# Patient Record
Sex: FEMALE | Race: BLACK OR AFRICAN AMERICAN | Employment: OTHER | URBAN - METROPOLITAN AREA
[De-identification: names, ages, dates, MRNs, and addresses within clinical notes are randomized per-mention and may not be internally consistent; named-entity substitution may affect disease eponyms.]

---

## 2016-11-15 LAB — CREATININE, EXTERNAL: 0.71

## 2017-01-16 ENCOUNTER — TELEPHONE (OUTPATIENT)
Dept: ENDOCRINOLOGY | Age: 67
End: 2017-01-16

## 2017-01-16 DIAGNOSIS — E11.65 UNCONTROLLED TYPE 2 DIABETES MELLITUS WITH HYPERGLYCEMIA, WITH LONG-TERM CURRENT USE OF INSULIN (HCC): ICD-10-CM

## 2017-01-16 DIAGNOSIS — Z79.4 UNCONTROLLED TYPE 2 DIABETES MELLITUS WITH HYPERGLYCEMIA, WITH LONG-TERM CURRENT USE OF INSULIN (HCC): ICD-10-CM

## 2017-01-17 RX ORDER — LANCETS
EACH MISCELLANEOUS
Qty: 200 EACH | Refills: 11 | Status: SHIPPED | OUTPATIENT
Start: 2017-01-17 | End: 2017-02-02 | Stop reason: SDUPTHER

## 2017-01-17 RX ORDER — INSULIN PUMP SYRINGE, 3 ML
EACH MISCELLANEOUS
Qty: 1 KIT | Refills: 0 | Status: SHIPPED | OUTPATIENT
Start: 2017-01-17 | End: 2017-01-30 | Stop reason: SDUPTHER

## 2017-01-19 ENCOUNTER — TELEPHONE (OUTPATIENT)
Dept: ENDOCRINOLOGY | Age: 67
End: 2017-01-19

## 2017-01-19 NOTE — TELEPHONE ENCOUNTER
Informed pt that she can take her metformin. Anything below 70 is considered a low blood sugar and that's when she needs to contact us. Pt verbalized understanding with no further questions or concerns at this time.

## 2017-01-19 NOTE — TELEPHONE ENCOUNTER
Patient went to ER last night blood sugar was 189. Patient says her blood sugar this morning is 164. Patient would like to know if she should take metformin this morning.  Please advise

## 2017-01-26 DIAGNOSIS — Z79.4 UNCONTROLLED TYPE 2 DIABETES MELLITUS WITH HYPERGLYCEMIA, WITH LONG-TERM CURRENT USE OF INSULIN (HCC): ICD-10-CM

## 2017-01-26 DIAGNOSIS — E11.65 UNCONTROLLED TYPE 2 DIABETES MELLITUS WITH HYPERGLYCEMIA, WITH LONG-TERM CURRENT USE OF INSULIN (HCC): ICD-10-CM

## 2017-01-27 ENCOUNTER — TELEPHONE (OUTPATIENT)
Dept: ENDOCRINOLOGY | Age: 67
End: 2017-01-27

## 2017-01-27 DIAGNOSIS — E11.65 UNCONTROLLED TYPE 2 DIABETES MELLITUS WITH HYPERGLYCEMIA, WITH LONG-TERM CURRENT USE OF INSULIN (HCC): ICD-10-CM

## 2017-01-27 DIAGNOSIS — Z79.4 UNCONTROLLED TYPE 2 DIABETES MELLITUS WITH HYPERGLYCEMIA, WITH LONG-TERM CURRENT USE OF INSULIN (HCC): ICD-10-CM

## 2017-01-27 NOTE — TELEPHONE ENCOUNTER
----- Message from Kyle Colbert sent at 1/27/2017  9:28 AM EST -----  Regarding: Dr. Jojo Chowdhury telephone  Contact: [phone]  Pt is requesting a call back regarding med metformin. Pt had questions about med dosage. Pt best contact number is [phone].

## 2017-01-30 RX ORDER — INSULIN PUMP SYRINGE, 3 ML
EACH MISCELLANEOUS
Qty: 1 KIT | Refills: 0 | Status: SHIPPED | OUTPATIENT
Start: 2017-01-30 | End: 2017-02-02 | Stop reason: SDUPTHER

## 2017-01-30 NOTE — TELEPHONE ENCOUNTER
Informed pt that freestyle meter was sent in as True Metrix is not covered. Pt states pharmacy did not send meter. Informed pt meter was sent on 01/17/2017. Pt asked that rx be sent to pharmacy again.

## 2017-01-30 NOTE — TELEPHONE ENCOUNTER
----- Message from [employer] sent at 1/27/2017 12:39 PM EST -----  Regarding: Ken/telephone  Pt stated the Rx she picked up for her test stripes will not work with the True Matrix meter. Pts phone number is  cell [phone].

## 2017-02-02 ENCOUNTER — TELEPHONE (OUTPATIENT)
Dept: ENDOCRINOLOGY | Age: 67
End: 2017-02-02

## 2017-02-02 DIAGNOSIS — E11.65 UNCONTROLLED TYPE 2 DIABETES MELLITUS WITH HYPERGLYCEMIA, WITH LONG-TERM CURRENT USE OF INSULIN (HCC): ICD-10-CM

## 2017-02-02 DIAGNOSIS — Z79.4 UNCONTROLLED TYPE 2 DIABETES MELLITUS WITH HYPERGLYCEMIA, WITH LONG-TERM CURRENT USE OF INSULIN (HCC): ICD-10-CM

## 2017-02-02 RX ORDER — INSULIN PUMP SYRINGE, 3 ML
EACH MISCELLANEOUS
Qty: 1 KIT | Refills: 0 | Status: SHIPPED | OUTPATIENT
Start: 2017-02-02 | End: 2017-03-22 | Stop reason: SDUPTHER

## 2017-02-02 RX ORDER — LANCETS 28 GAUGE
EACH MISCELLANEOUS
Qty: 200 LANCET | Refills: 11 | Status: SHIPPED | OUTPATIENT
Start: 2017-02-02 | End: 2017-03-22 | Stop reason: SDUPTHER

## 2017-02-02 NOTE — TELEPHONE ENCOUNTER
----- Message from Florence Davila sent at 2/2/2017  3:31 PM EST -----  Regarding: / telephone  Contact: [phone]  Pt is requesting a prescription for diabetic machine. Pt stated she spoke with pharmacy and said they didn't have a prescription and insurance company said they will cover new machine and give them a call.  Pt's best contact number is [phone]

## 2017-02-02 NOTE — TELEPHONE ENCOUNTER
Franck Treviño stated they spoke with the pt and told her she has to use a larger chain pharmacy due to her having medicare. Pt states the machine can be sent to [facility].

## 2017-02-02 NOTE — TELEPHONE ENCOUNTER
Informed pt that Dr Alexa Sheikh asked that she take Lantus 60 units in the AM and 50 units at bedtime. Informed her that she is to continue her meal time insulin and call the office if she is having any more low blood sugars. Pt verbalized understanding.

## 2017-02-02 NOTE — TELEPHONE ENCOUNTER
Pt called stating she is having low BG in the mornings. She is asking can she decrease one of her medications. She does not have any infections nor has she started any new medications. Pt confirmed she is taking medications as prescribed.  BG readings:  01/30- 104 AM   107 lunch   205 dinner   132 bedtime  01/31-  110 AM   119 lunch   203 dinner   132 bedtime  02/01- 68 AM   89 lunch   107 dinner   88 bedtime  02/02- 87 AM

## 2017-02-02 NOTE — TELEPHONE ENCOUNTER
Decrease Lantus 60 units in AM , 50 units at bedtime      No change to her meal time insulin for now    If she continues to have low sugars to call us

## 2017-03-22 ENCOUNTER — OFFICE VISIT (OUTPATIENT)
Dept: ENDOCRINOLOGY | Age: 67
End: 2017-03-22

## 2017-03-22 VITALS
BODY MASS INDEX: 36.89 KG/M2 | HEIGHT: 65 IN | SYSTOLIC BLOOD PRESSURE: 110 MMHG | HEART RATE: 73 BPM | WEIGHT: 221.4 LBS | DIASTOLIC BLOOD PRESSURE: 56 MMHG | RESPIRATION RATE: 18 BRPM | TEMPERATURE: 96.1 F

## 2017-03-22 DIAGNOSIS — Z79.4 TYPE 2 DIABETES MELLITUS WITH HYPERGLYCEMIA, WITH LONG-TERM CURRENT USE OF INSULIN (HCC): Primary | ICD-10-CM

## 2017-03-22 DIAGNOSIS — E11.65 TYPE 2 DIABETES MELLITUS WITH HYPERGLYCEMIA, UNSPECIFIED LONG TERM INSULIN USE STATUS: ICD-10-CM

## 2017-03-22 DIAGNOSIS — I10 ESSENTIAL HYPERTENSION WITH GOAL BLOOD PRESSURE LESS THAN 140/90: ICD-10-CM

## 2017-03-22 DIAGNOSIS — E11.65 UNCONTROLLED TYPE 2 DIABETES MELLITUS WITH HYPERGLYCEMIA, WITH LONG-TERM CURRENT USE OF INSULIN (HCC): ICD-10-CM

## 2017-03-22 DIAGNOSIS — Z79.4 TYPE 2 DIABETES MELLITUS WITH HYPERGLYCEMIA, WITH LONG-TERM CURRENT USE OF INSULIN (HCC): ICD-10-CM

## 2017-03-22 DIAGNOSIS — E11.65 TYPE 2 DIABETES MELLITUS WITH HYPERGLYCEMIA, WITH LONG-TERM CURRENT USE OF INSULIN (HCC): ICD-10-CM

## 2017-03-22 DIAGNOSIS — Z79.4 UNCONTROLLED TYPE 2 DIABETES MELLITUS WITH HYPERGLYCEMIA, WITH LONG-TERM CURRENT USE OF INSULIN (HCC): ICD-10-CM

## 2017-03-22 DIAGNOSIS — E78.2 MIXED HYPERLIPIDEMIA: ICD-10-CM

## 2017-03-22 DIAGNOSIS — E11.65 TYPE 2 DIABETES MELLITUS WITH HYPERGLYCEMIA, WITH LONG-TERM CURRENT USE OF INSULIN (HCC): Primary | ICD-10-CM

## 2017-03-22 RX ORDER — METFORMIN HYDROCHLORIDE 1000 MG/1
1000 TABLET ORAL 2 TIMES DAILY WITH MEALS
Qty: 60 TAB | Refills: 5 | Status: SHIPPED | OUTPATIENT
Start: 2017-03-22 | End: 2017-06-22 | Stop reason: SDUPTHER

## 2017-03-22 RX ORDER — INSULIN LISPRO 100 [IU]/ML
INJECTION, SOLUTION INTRAVENOUS; SUBCUTANEOUS
Qty: 45 ML | Refills: 5 | Status: SHIPPED | OUTPATIENT
Start: 2017-03-22 | End: 2017-06-22 | Stop reason: SDUPTHER

## 2017-03-22 RX ORDER — INSULIN GLARGINE 100 [IU]/ML
INJECTION, SOLUTION SUBCUTANEOUS
Qty: 120 ML | Refills: 11 | Status: SHIPPED | OUTPATIENT
Start: 2017-03-22 | End: 2017-06-22 | Stop reason: SDUPTHER

## 2017-03-22 NOTE — PROGRESS NOTES
[facility]               Linda Duong MD        [address] 78 [phone] Fax [phone]               Patient Information  Date:3/22/2017  Name : Fady Liu 73 y.o.     YOB: 1950         Referred by: Jessenia Cordero MD         Chief Complaint   Patient presents with    Diabetes    Cholesterol Problem    Hypertension       History of Present Illness: Fady Liu is a 73 y.o. female here for fu of  Type 2 Diabetes Mellitus. Referred by Jessenia Cordero MD for evaluation and management of uncontrolled diabetes mellitus. She was diagnosed with diabetes in 2008. She is checking blood glucose 3- 4 times daily. Reviewed log   Fasting hypoglycemia  , pre lunch are at goal ,   Diet healthy       + hypoglycemia. Wt Readings from Last 3 Encounters:   03/22/17 221 lb 6.4 oz (100.4 kg)   12/21/16 222 lb (100.7 kg)   09/21/16 222 lb (100.7 kg)       BP Readings from Last 3 Encounters:   03/22/17 110/56   12/21/16 126/56   09/21/16 107/43           Past Medical History:   Diagnosis Date    Arthritis     Breast cancer ([facility] [address]     Diabetes ([facility] [address]     Hyperlipidemia     Hypertension     Seizure ([facility] [address]      Current Outpatient Prescriptions   Medication Sig    glucose blood VI test strips (TRUE METRIX GLUCOSE TEST STRIP) strip by Does Not Apply route See Admin Instructions. Use to check blood glucose 4x daily. Dx code: E11.65    insulin lispro (HUMALOG KWIKPEN) 100 unit/mL kwikpen 45 units before each meal along with sliding scale Max daily units 150 Stop Apidra    metFORMIN (GLUCOPHAGE) 1,000 mg tablet Take 1 Tab by mouth two (2) times daily (with meals).  Insulin Needles, Disposable, (FAITH PEN NEEDLE) 32 gauge x 5/32\" ndle Four times a day Dx Code: E11.65    insulin syringe-needle U-100 1 mL 31 gauge x 15/64\" syrg 1 Syringe by Does Not Apply route two (2) times a day.  Dx code: E11.65    insulin glargine (LANTUS) 100 unit/mL injection Inject 60 units in AM and 60 units  at bed time (Patient taking differently: Inject 60 units in AM and 50 units  at bed time)    pioglitazone (ACTOS) 30 mg tablet TAKE ONE (1) TABLET, BY MOUTH, DAILY.  VITAMIN D2 50,000 unit capsule Take 50,000 Units by mouth. Every wed and sat    furosemide (LASIX) 40 mg tablet Take 40 mg by mouth daily.  atorvastatin (LIPITOR) 40 mg tablet Take  by mouth daily.  lisinopril (PRINIVIL, ZESTRIL) 5 mg tablet Take 5 mg by mouth daily.  loratadine (CLARITIN) 10 mg tablet Take 10 mg by mouth daily.  levETIRAcetam (KEPPRA) 500 mg tablet Take  by mouth two (2) times a day.  aspirin delayed-release 81 mg tablet Take  by mouth daily.  potassium chloride (KAON 10%) 20 mEq/15 mL solution Take  by mouth daily.  nabumetone (RELAFEN) 500 mg tablet Take 500 mg by mouth two (2) times a day. No current facility-administered medications for this visit. Allergies   Allergen Reactions    Vicodin [Hydrocodone-Acetaminophen] Unknown (comments)         Review of Systems:  -   - Gastrointestinal: no dysphagia no  abdominal pain  - Musculoskeletal: +  joint pains +   weakness  - Integumentary: no rashes  - Neurological: +  numbness, tingling, no  headaches  - Psychiatric: no depression no  anxiety  - Endocrine: no heat or cold intolerance    Physical Examination:   Blood pressure 110/56, pulse 73, temperature 96.1 °F (35.6 °C), temperature source Oral, resp. rate 18, height 5' 5\" (1.651 m), weight 221 lb 6.4 oz (100.4 kg). Estimated body mass index is 36.84 kg/(m^2) as calculated from the following:    Height as of this encounter: 5' 5\" (1.651 m). -   Weight as of this encounter: 221 lb 6.4 oz (100.4 kg).   - General: pleasant, no distress, good eye contact  - HEENT: no pallor, no periorbital edema, EOMI  - Neck: supple, no thyromegaly, no nodules  - Cardiovascular: regular, normal rate, normal S1 and S2  - Respiratory: clear to auscultation bilaterally  - Musculoskeletal: no edema,   - Neurological:alert and oriented  - Psychiatric: normal mood and affect  - Skin: color, texture, turgor normal.       Data Reviewed:     [] Glucose records reviewed. [] See glucose records for details (to be scanned). [] A1C  [] Reviewed labs      Assessment/Plan:     1. Type 2 diabetes mellitus with hyperglycemia, with long-term current use of insulin ([address]    2. Essential hypertension with goal blood pressure less than 140/90    3. Mixed hyperlipidemia        1. Type 2 Diabetes Mellitus       Lab Results   Component Value Date/Time    Hemoglobin A1c 7.6 03/15/2017 09:57 AM    Hemoglobin A1c (POC) 10.6 12/21/2016 11:28 AM    Hemoglobin A1c, External 9.4 08/11/2016     Discussed pathophysiology of type 2 diabetes, nutritional classes discussed. Actos 30 mg. Lantus BID, Apidra 40  units before each meal.   Stressed the importance of eating a healthy diet. DM classes    Metformin 1000 mg BID   ASA    Patient Instructions   Check blood sugars before meals/breakfast and at bedtime. Low blood glucose is less than 70     Maintain the log and bring it all your appointments    If the bedtime sugars are less than 100 ,eat a 15 gm snack. Continue Actos , Metformin     Lantus  60   units in AM and 40 units at bed time    Novolog or Apidra insulin 40 units before breakfast, 40 units before lunch and 40 units before dinner. No Apidra if sugars are less than 70     Additional Novolog or Humalog or Apidra  for high blood sugars     150-200 mg   3 units   201-250 mg   6 units   251-300 mg   9 units   301-350 mg   12units   351-400 mg   15 units           FLU annually ,Pneumovax ,aspirin daily,annual eye exam,microalbumin    2. HTN : Continue current therapy     3. Hyperlipidemia : Continue statin. 4.Obesity:Body mass index is 36.84 kg/(m^2). Discussed about the importance of exercise and carbohydrate portion control.           There are no Patient Instructions on file for this visit. Follow-up Disposition: Not on File    Thank you for allowing me to participate in the care of this patient.     Romulo Hernandes MD

## 2017-03-22 NOTE — PATIENT INSTRUCTIONS
Check blood sugars before meals/breakfast and at bedtime. Low blood glucose is less than 70     Maintain the log and bring it all your appointments    If the bedtime sugars are less than 100 ,eat a 15 gm snack. Continue Actos , Metformin     Lantus  60   units in AM and 40 units at bed time    Novolog or Apidra insulin 40 units before breakfast, 40 units before lunch and 40 units before dinner.    No Apidra if sugars are less than 70     Additional Novolog or Humalog or Apidra  for high blood sugars     150-200 mg   3 units   201-250 mg   6 units   251-300 mg   9 units   301-350 mg   12units   351-400 mg   15 units

## 2017-03-22 NOTE — MR AVS SNAPSHOT
Visit Information Date & Time Provider Department Dept. Phone Encounter #  
 3/22/2017 10:30 AM Kelby Chidlress MD [facility] [phone] [id number] Follow-up Instructions Return in about 3 months (around 6/22/2017). Upcoming Health Maintenance Date Due Hepatitis C Screening 1950 FOOT EXAM Q1 7/28/1961 EYE EXAM RETINAL OR DILATED Q1 7/28/1961 DTaP/Tdap/Td series (1 - Tdap) 7/28/1972 BREAST CANCER SCRN MAMMOGRAM 7/28/2001 FOBT Q 1 YEAR AGE 50-75 7/28/2001 ZOSTER VACCINE AGE 60> 7/28/2011 GLAUCOMA SCREENING Q2Y 7/28/2016 OSTEOPOROSIS SCREENING (DEXA) 7/28/2016 Pneumococcal 65+ High/Highest Risk (1 of 2 - PCV13) 7/28/2016 MEDICARE YEARLY EXAM 7/28/2016 INFLUENZA AGE 9 TO ADULT 8/1/2016 HEMOGLOBIN A1C Q6M 9/15/2017 MICROALBUMIN Q1 3/15/2018 LIPID PANEL Q1 3/15/2018 Allergies as of 3/22/2017  Review Complete On: 3/22/2017 By: Kelby Childress MD  
  
 Severity Noted Reaction Type Reactions Vicodin [Hydrocodone-acetaminophen]  03/13/2015    Unknown (comments) Current Immunizations  Never Reviewed No immunizations on file. Not reviewed this visit You Were Diagnosed With   
  
 Codes Comments Type 2 diabetes mellitus with hyperglycemia, with long-term current use of insulin (HCC)    -  Primary ICD-10-CM: E11.65, Z79.4 ICD-9-CM: 250.00, 790.29, V58.67 Essential hypertension with goal blood pressure less than 140/90     ICD-10-CM: I10 
ICD-9-CM: 401.9 Mixed hyperlipidemia     ICD-10-CM: E78.2 ICD-9-CM: 272.2 Vitals BP Pulse Temp Resp Height(growth percentile) Weight(growth percentile) 110/56 (BP 1 Location: Right arm, BP Patient Position: Sitting) 73 96.1 °F (35.6 °C) (Oral) 18 5' 5\" (1.651 m) 221 lb 6.4 oz (100.4 kg) BMI OB Status Smoking Status 36.84 kg/m2 Postmenopausal Former Smoker Vitals History BMI and BSA Data Body Mass Index Body Surface Area  
 36.84 kg/m 2 2.15 m 2 Preferred Pharmacy Pharmacy Name Phone [facility] [phone] Your Updated Medication List  
  
   
This list is accurate as of: 3/22/17 11:06 AM.  Always use your most recent med list.  
  
  
  
  
 aspirin delayed-release 81 mg tablet Take  by mouth daily. atorvastatin 40 mg tablet Commonly known as:  LIPITOR Take  by mouth daily. furosemide 40 mg tablet Commonly known as:  LASIX Take 40 mg by mouth daily. insulin glargine 100 unit/mL injection Commonly known as:  LANTUS Inject 60 units in AM and 60 units  at bed time  
  
 insulin lispro 100 unit/mL kwikpen Commonly known as:  HumaLOG KwikPen 45 units before each meal along with sliding scale Max daily units 150 Stop Apidra Insulin Needles (Disposable) 32 gauge x 5/32\" Ndle Commonly known as:  Janelle Pen Needle Four times a day Dx Code: E11.65  
  
 insulin syringe-needle U-100 1 mL 31 gauge x 15/64\" Syrg 1 Syringe by Does Not Apply route two (2) times a day. Dx code: E11.65  
  
 levETIRAcetam 500 mg tablet Commonly known as:  KEPPRA Take  by mouth two (2) times a day. lisinopril 5 mg tablet Commonly known as:  Arnold Asiya Take 5 mg by mouth daily. loratadine 10 mg tablet Commonly known as:  Juliet Kaufman Take 10 mg by mouth daily. metFORMIN 1,000 mg tablet Commonly known as:  GLUCOPHAGE Take 1 Tab by mouth two (2) times daily (with meals). nabumetone 500 mg tablet Commonly known as:  RELAFEN Take 500 mg by mouth two (2) times a day. pioglitazone 30 mg tablet Commonly known as:  ACTOS  
TAKE ONE (1) TABLET, BY MOUTH, DAILY. potassium chloride 20 mEq/15 mL solution Commonly known as:  KAON 10% Take  by mouth daily. TRUE METRIX GLUCOSE TEST STRIP strip Generic drug:  glucose blood VI test strips by Does Not Apply route See Admin Instructions. Use to check blood glucose 4x daily. Dx code: E11.65 VITAMIN D2 50,000 unit capsule Generic drug:  ergocalciferol Take 50,000 Units by mouth. Every wed and sat Follow-up Instructions Return in about 3 months (around 6/22/2017). Patient Instructions Check blood sugars before meals/breakfast and at bedtime. Low blood glucose is less than 70 Maintain the log and bring it all your appointments If the bedtime sugars are less than 100 ,eat a 15 gm snack. Continue Actos , Metformin Lantus  60   units in AM and 40 units at bed time Novolog or Apidra insulin 40 units before breakfast, 40 units before lunch and 40 units before dinner. No Apidra if sugars are less than 70 Additional Novolog or Humalog or Apidra  for high blood sugars 150-200 mg   3 units 201-250 mg   6 units 251-300 mg   9 units 301-350 mg   12units 351-400 mg   15 units Introducing [facility]! Jayla Page introduces [employer] patient portal. Now you can access parts of your medical record, email your doctor's office, and request medication refills online. 1. In your internet browser, go to [email or web address] 2. Click on the First Time User? Click Here link in the Sign In box. You will see the New Member Sign Up page. 3. Enter your [employer] Access Code exactly as it appears below. You will not need to use this code after youve completed the sign-up process. If you do not sign up before the expiration date, you must request a new code. · [employer] Access Code: [email or web address] Expires: 6/20/2017 11:06 AM 
 
4. Enter the last four digits of your Social Security Number (xxxx) and Date of Birth (mm/dd/yyyy) as indicated and click Submit. You will be taken to the next sign-up page. 5. Create a [employer] ID.  This will be your [employer] login ID and cannot be changed, so think of one that is secure and easy to remember. 6. Create a [employer] password. You can change your password at any time. 7. Enter your Password Reset Question and Answer. This can be used at a later time if you forget your password. 8. Enter your e-mail address. You will receive e-mail notification when new information is available in [address] 9. Click Sign Up. You can now view and download portions of your medical record. 10. Click the Download Summary menu link to download a portable copy of your medical information. If you have questions, please visit the Frequently Asked Questions section of the [employer] website. Remember, [employer] is NOT to be used for urgent needs. For medical emergencies, dial 911. Now available from your iPhone and Android! Please provide this summary of care documentation to your next provider. Your primary care clinician is listed as Maureen Helton. If you have any questions after today's visit, please call [phone].

## 2017-03-22 NOTE — PROGRESS NOTES
Amanuel Bennett is a 73 y.o. female here for   Chief Complaint   Patient presents with    Diabetes    Cholesterol Problem    Hypertension       Functional glucose monitor and record keeping system? - yes  Eye exam within last year? - yes 3/17/17  Foot exam within last year? - yes    Lab Results   Component Value Date/Time    Hemoglobin A1c 7.6 03/15/2017 09:57 AM    Hemoglobin A1c (POC) 10.6 12/21/2016 11:28 AM    Hemoglobin A1c, External 9.4 08/11/2016       Wt Readings from Last 3 Encounters:   12/21/16 222 lb (100.7 kg)   09/21/16 222 lb (100.7 kg)   05/19/16 203 lb 8 oz (92.3 kg)     Temp Readings from Last 3 Encounters:   12/21/16 96.2 °F (35.7 °C) (Oral)   09/21/16 98 °F (36.7 °C) (Oral)   05/19/16 97.5 °F (36.4 °C) (Oral)     BP Readings from Last 3 Encounters:   12/21/16 126/56   09/21/16 107/43   05/19/16 120/57     Pulse Readings from Last 3 Encounters:   12/21/16 67   09/21/16 68   05/19/16 67

## 2017-06-21 LAB — CREATININE, EXTERNAL: 0.68

## 2017-06-22 ENCOUNTER — OFFICE VISIT (OUTPATIENT)
Dept: ENDOCRINOLOGY | Age: 67
End: 2017-06-22

## 2017-06-22 VITALS
WEIGHT: 218 LBS | HEIGHT: 65 IN | OXYGEN SATURATION: 94 % | TEMPERATURE: 96.6 F | BODY MASS INDEX: 36.32 KG/M2 | HEART RATE: 85 BPM | RESPIRATION RATE: 18 BRPM | SYSTOLIC BLOOD PRESSURE: 106 MMHG | DIASTOLIC BLOOD PRESSURE: 56 MMHG

## 2017-06-22 DIAGNOSIS — Z79.4 TYPE 2 DIABETES MELLITUS WITH HYPERGLYCEMIA, WITH LONG-TERM CURRENT USE OF INSULIN (HCC): Primary | ICD-10-CM

## 2017-06-22 DIAGNOSIS — I10 ESSENTIAL HYPERTENSION WITH GOAL BLOOD PRESSURE LESS THAN 140/90: ICD-10-CM

## 2017-06-22 DIAGNOSIS — E11.65 UNCONTROLLED TYPE 2 DIABETES MELLITUS WITH HYPERGLYCEMIA, WITH LONG-TERM CURRENT USE OF INSULIN (HCC): ICD-10-CM

## 2017-06-22 DIAGNOSIS — Z79.4 UNCONTROLLED TYPE 2 DIABETES MELLITUS WITH HYPERGLYCEMIA, WITH LONG-TERM CURRENT USE OF INSULIN (HCC): ICD-10-CM

## 2017-06-22 DIAGNOSIS — E11.65 TYPE 2 DIABETES MELLITUS WITH HYPERGLYCEMIA, WITH LONG-TERM CURRENT USE OF INSULIN (HCC): ICD-10-CM

## 2017-06-22 DIAGNOSIS — E78.2 MIXED HYPERLIPIDEMIA: ICD-10-CM

## 2017-06-22 DIAGNOSIS — E11.65 TYPE 2 DIABETES MELLITUS WITH HYPERGLYCEMIA, WITH LONG-TERM CURRENT USE OF INSULIN (HCC): Primary | ICD-10-CM

## 2017-06-22 DIAGNOSIS — Z79.4 TYPE 2 DIABETES MELLITUS WITH HYPERGLYCEMIA, WITH LONG-TERM CURRENT USE OF INSULIN (HCC): ICD-10-CM

## 2017-06-22 DIAGNOSIS — E11.65 TYPE 2 DIABETES MELLITUS WITH HYPERGLYCEMIA, UNSPECIFIED LONG TERM INSULIN USE STATUS: ICD-10-CM

## 2017-06-22 LAB — GLUCOSE POC: 107 MG/DL

## 2017-06-22 RX ORDER — INSULIN GLARGINE 100 [IU]/ML
INJECTION, SOLUTION SUBCUTANEOUS
Qty: 120 ML | Refills: 11
Start: 2017-06-22 | End: 2017-11-24 | Stop reason: ALTCHOICE

## 2017-06-22 RX ORDER — TROSPIUM CHLORIDE 20 MG/1
40 TABLET, FILM COATED ORAL DAILY
COMMUNITY
Start: 2017-06-20 | End: 2018-11-05

## 2017-06-22 RX ORDER — INSULIN LISPRO 100 [IU]/ML
INJECTION, SOLUTION INTRAVENOUS; SUBCUTANEOUS
Qty: 45 ML | Refills: 5 | Status: CANCELLED | OUTPATIENT
Start: 2017-06-22

## 2017-06-22 RX ORDER — SYRINGE AND NEEDLE,INSULIN,1ML 31 GX5/16"
SYRINGE, EMPTY DISPOSABLE MISCELLANEOUS
COMMUNITY
Start: 2017-05-01 | End: 2018-11-05

## 2017-06-22 RX ORDER — CYCLOSPORINE 0.5 MG/ML
EMULSION OPHTHALMIC
COMMUNITY
Start: 2017-05-20 | End: 2022-02-04 | Stop reason: ALTCHOICE

## 2017-06-22 RX ORDER — METFORMIN HYDROCHLORIDE 1000 MG/1
1000 TABLET ORAL 2 TIMES DAILY WITH MEALS
Qty: 60 TAB | Refills: 5 | Status: CANCELLED | OUTPATIENT
Start: 2017-06-22

## 2017-06-22 RX ORDER — AZELASTINE HYDROCHLORIDE 0.5 MG/ML
SOLUTION/ DROPS OPHTHALMIC
COMMUNITY
Start: 2017-06-15 | End: 2022-02-04 | Stop reason: ALTCHOICE

## 2017-06-22 RX ORDER — INSULIN LISPRO 100 [IU]/ML
INJECTION, SOLUTION INTRAVENOUS; SUBCUTANEOUS
Qty: 45 ML | Refills: 5 | Status: SHIPPED | OUTPATIENT
Start: 2017-06-22 | End: 2018-02-20 | Stop reason: SDUPTHER

## 2017-06-22 RX ORDER — FAMOTIDINE 20 MG/1
TABLET, FILM COATED ORAL
COMMUNITY
Start: 2017-06-21 | End: 2018-11-05

## 2017-06-22 RX ORDER — METFORMIN HYDROCHLORIDE 1000 MG/1
1000 TABLET ORAL 2 TIMES DAILY WITH MEALS
Qty: 180 TAB | Refills: 3 | Status: SHIPPED | OUTPATIENT
Start: 2017-06-22 | End: 2018-06-19 | Stop reason: SDUPTHER

## 2017-06-22 RX ORDER — PIOGLITAZONEHYDROCHLORIDE 30 MG/1
TABLET ORAL
Qty: 90 TAB | Refills: 3 | Status: CANCELLED | OUTPATIENT
Start: 2017-06-22

## 2017-06-22 RX ORDER — PIOGLITAZONEHYDROCHLORIDE 30 MG/1
TABLET ORAL
Qty: 90 TAB | Refills: 3 | Status: SHIPPED | OUTPATIENT
Start: 2017-06-22 | End: 2018-01-30 | Stop reason: SDUPTHER

## 2017-06-22 NOTE — MR AVS SNAPSHOT
Visit Information Date & Time Provider Department Dept. Phone Encounter #  
 6/22/2017 11:00 AM Brad Villeda MD [facility] Diabetes & Endocrinology [phone] [id number] Follow-up Instructions Return in about 3 months (around 9/22/2017). Upcoming Health Maintenance Date Due Hepatitis C Screening 1950 FOOT EXAM Q1 7/28/1961 EYE EXAM RETINAL OR DILATED Q1 7/28/1961 DTaP/Tdap/Td series (1 - Tdap) 7/28/1972 BREAST CANCER SCRN MAMMOGRAM 7/28/2001 FOBT Q 1 YEAR AGE 50-75 7/28/2001 ZOSTER VACCINE AGE 60> 7/28/2011 GLAUCOMA SCREENING Q2Y 7/28/2016 OSTEOPOROSIS SCREENING (DEXA) 7/28/2016 Pneumococcal 65+ High/Highest Risk (1 of 2 - PCV13) 7/28/2016 MEDICARE YEARLY EXAM 7/28/2016 INFLUENZA AGE 9 TO ADULT 8/1/2017 HEMOGLOBIN A1C Q6M 9/15/2017 MICROALBUMIN Q1 3/15/2018 LIPID PANEL Q1 3/15/2018 Allergies as of 6/22/2017  Review Complete On: 6/22/2017 By: Kashif Rock LPN Severity Noted Reaction Type Reactions Oxycodone-acetaminophen  06/12/2017    Unknown (comments) Vicodin [Hydrocodone-acetaminophen]  03/13/2015    Unknown (comments) Current Immunizations  Never Reviewed No immunizations on file. Not reviewed this visit You Were Diagnosed With   
  
 Codes Comments Type 2 diabetes mellitus with hyperglycemia, with long-term current use of insulin (HCC)    -  Primary ICD-10-CM: E11.65, Z79.4 ICD-9-CM: 250.00, 790.29, V58.67 Type 2 diabetes mellitus with hyperglycemia, unspecified long term insulin use status     ICD-10-CM: E11.65 ICD-9-CM: 250.00 Uncontrolled type 2 diabetes mellitus with hyperglycemia, with long-term current use of insulin (HCC)     ICD-10-CM: E11.65, Z79.4 ICD-9-CM: 250.02, V58.67 Vitals BP Pulse Temp Resp Height(growth percentile) Weight(growth percentile) 106/56 85 96.6 °F (35.9 °C) (Oral) 18 5' 5\" (1.651 m) 218 lb (98.9 kg) SpO2 BMI OB Status Smoking Status 94% 36.28 kg/m2 Postmenopausal Former Smoker Vitals History BMI and BSA Data Body Mass Index Body Surface Area  
 36.28 kg/m 2 2.13 m 2 Preferred Pharmacy Pharmacy Name Phone [facility] [phone] Your Updated Medication List  
  
   
This list is accurate as of: 6/22/17 11:41 AM.  Always use your most recent med list.  
  
  
  
  
 aspirin delayed-release 81 mg tablet Take  by mouth daily. atorvastatin 40 mg tablet Commonly known as:  LIPITOR Take  by mouth daily. azelastine 0.05 % ophthalmic solution Commonly known as:  OPTIVAR  
  
 famotidine 20 mg tablet Commonly known as:  PEPCID  
  
 furosemide 40 mg tablet Commonly known as:  LASIX Take 40 mg by mouth daily. insulin glargine 100 unit/mL injection Commonly known as:  LANTUS Inject 60 units in AM and 40 units  at bed time  
  
 insulin lispro 100 unit/mL kwikpen Commonly known as:  HumaLOG KwikPen 40 units before each meal along with sliding scale Max daily units 150 Stop Apidra Insulin Needles (Disposable) 32 gauge x 5/32\" Ndle Commonly known as:  Janelle Pen Needle Four times a day Dx Code: E11.65  
  
 * insulin syringe-needle U-100 1 mL 31 gauge x 15/64\" Syrg 1 Syringe by Does Not Apply route two (2) times a day. Dx code: E11.65  
  
 * ULTICARE 1 mL 31 gauge x 5/16 Syrg Generic drug:  Insulin Syringe-Needle U-100  
  
 levETIRAcetam 500 mg tablet Commonly known as:  KEPPRA Take  by mouth two (2) times a day. lisinopril 5 mg tablet Commonly known as:  Roxianne Daughters Take 5 mg by mouth daily. loratadine 10 mg tablet Commonly known as:  Lendon Allen Take 10 mg by mouth daily. metFORMIN 1,000 mg tablet Commonly known as:  GLUCOPHAGE Take 1 Tab by mouth two (2) times daily (with meals). nabumetone 500 mg tablet Commonly known as:  RELAFEN  
 Take 500 mg by mouth two (2) times a day. pioglitazone 30 mg tablet Commonly known as:  ACTOS  
TAKE ONE (1) TABLET, BY MOUTH, DAILY. potassium chloride 20 mEq/15 mL solution Commonly known as:  KAON 10% Take  by mouth daily. RESTASIS 0.05 % ophthalmic emulsion Generic drug:  cycloSPORINE  
  
 trospium 20 mg tablet Commonly known as:  SANCTURA  
  
 TRUE METRIX GLUCOSE TEST STRIP strip Generic drug:  glucose blood VI test strips  
by Does Not Apply route See Admin Instructions. Use to check blood glucose 4x daily. Dx code: E11.65 VITAMIN D2 50,000 unit capsule Generic drug:  ergocalciferol Take 50,000 Units by mouth. Every wed and sat * Notice: This list has 2 medication(s) that are the same as other medications prescribed for you. Read the directions carefully, and ask your doctor or other care provider to review them with you. We Performed the Following AMB POC GLUCOSE BLOOD, BY GLUCOSE MONITORING DEVICE [39446 CPT(R)] AMB POC HEMOGLOBIN A1C [95583 CPT(R)] Follow-up Instructions Return in about 3 months (around 9/22/2017). Patient Instructions Check blood sugars before meals/breakfast and at bedtime. Low blood glucose is less than 70 Maintain the log and bring it all your appointments If the bedtime sugars are less than 100 ,eat a 15 gm snack. Continue Actos , Metformin Lantus  60   units in AM and 40 units at bed time Novolog or Apidra insulin 35  units before breakfast, 35 units before lunch and 35units before dinner. No Apidra if sugars are less than 70 Additional Novolog or Humalog or Apidra  for high blood sugars 150-200 mg   3 units 201-250 mg   6 units 251-300 mg   9 units 301-350 mg   12units 351-400 mg   15 units Introducing [facility]!    
 Alvaro Hendricks introduces SMART patient portal. Now you can access parts of your medical record, email your doctor's office, and request medication refills online. 1. In your internet browser, go to [email or web address] 2. Click on the First Time User? Click Here link in the Sign In box. You will see the New Member Sign Up page. 3. Enter your [employer] Access Code exactly as it appears below. You will not need to use this code after youve completed the sign-up process. If you do not sign up before the expiration date, you must request a new code. · [employer] Access Code: [email or web address] Expires: 9/20/2017 11:41 AM 
 
4. Enter the last four digits of your Social Security Number (xxxx) and Date of Birth (mm/dd/yyyy) as indicated and click Submit. You will be taken to the next sign-up page. 5. Create a [employer] ID. This will be your [employer] login ID and cannot be changed, so think of one that is secure and easy to remember. 6. Create a [employer] password. You can change your password at any time. 7. Enter your Password Reset Question and Answer. This can be used at a later time if you forget your password. 8. Enter your e-mail address. You will receive e-mail notification when new information is available in [address] 9. Click Sign Up. You can now view and download portions of your medical record. 10. Click the Download Summary menu link to download a portable copy of your medical information. If you have questions, please visit the Frequently Asked Questions section of the [employer] website. Remember, [employer] is NOT to be used for urgent needs. For medical emergencies, dial 911. Now available from your iPhone and Android! Please provide this summary of care documentation to your next provider. Your primary care clinician is listed as Nicole Nice. If you have any questions after today's visit, please call [phone].

## 2017-06-22 NOTE — PROGRESS NOTES
[facility] ENDOCRINOLOGY               Breanne Culver MD        [address] 78 [phone] Fax [phone]               Patient Information  Date:6/22/2017  Name : Mirtha Hsieh 73 y.o.     YOB: 1950         Referred by: Kami Srinivasan MD         Chief Complaint   Patient presents with    Diabetes       History of Present Illness: Mirtha Hsieh is a 73 y.o. female here for fu of  Type 2 Diabetes Mellitus. Referred by Kami Srinivasan MD for evaluation and management of uncontrolled diabetes mellitus. She was diagnosed with diabetes in 2008. She is checking blood glucose 3- 4 times daily. Reviewed log   She is doing very well   Few hypos   Diet healthy             Wt Readings from Last 3 Encounters:   06/22/17 218 lb (98.9 kg)   03/22/17 221 lb 6.4 oz (100.4 kg)   12/21/16 222 lb (100.7 kg)       BP Readings from Last 3 Encounters:   06/22/17 106/56   03/22/17 110/56   12/21/16 126/56           Past Medical History:   Diagnosis Date    Arthritis     [facility]     Diabetes [facility]     Hyperlipidemia     Hypertension     Seizure ([facility]      Current Outpatient Prescriptions   Medication Sig    azelastine (OPTIVAR) 0.05 % ophthalmic solution     RESTASIS 0.05 % ophthalmic emulsion     famotidine (PEPCID) 20 mg tablet     ULTICARE 1 mL 31 gauge x 5/16 syrg     trospium (SANCTURA) 20 mg tablet     glucose blood VI test strips (TRUE METRIX GLUCOSE TEST STRIP) strip by Does Not Apply route See Admin Instructions. Use to check blood glucose 4x daily. Dx code: E11.65    metFORMIN (GLUCOPHAGE) 1,000 mg tablet Take 1 Tab by mouth two (2) times daily (with meals).     insulin lispro (HUMALOG KWIKPEN) 100 unit/mL kwikpen 40 units before each meal along with sliding scale Max daily units 150 Stop Apidra    insulin glargine (LANTUS) 100 unit/mL injection Inject 60 units in AM and 40 units  at bed time    Insulin Needles, Disposable, (FAITH PEN NEEDLE) 32 gauge x 5/32\" ndle Four times a day Dx Code: E11.65    insulin syringe-needle U-100 1 mL 31 gauge x 15/64\" syrg 1 Syringe by Does Not Apply route two (2) times a day. Dx code: E11.65    pioglitazone (ACTOS) 30 mg tablet TAKE ONE (1) TABLET, BY MOUTH, DAILY.  VITAMIN D2 50,000 unit capsule Take 50,000 Units by mouth. Every wed and sat    furosemide (LASIX) 40 mg tablet Take 40 mg by mouth daily.  atorvastatin (LIPITOR) 40 mg tablet Take  by mouth daily.  lisinopril (PRINIVIL, ZESTRIL) 5 mg tablet Take 5 mg by mouth daily.  loratadine (CLARITIN) 10 mg tablet Take 10 mg by mouth daily.  levETIRAcetam (KEPPRA) 500 mg tablet Take  by mouth two (2) times a day.  aspirin delayed-release 81 mg tablet Take  by mouth daily.  potassium chloride (KAON 10%) 20 mEq/15 mL solution Take  by mouth daily.  nabumetone (RELAFEN) 500 mg tablet Take 500 mg by mouth two (2) times a day. No current facility-administered medications for this visit. Allergies   Allergen Reactions    Oxycodone-Acetaminophen Unknown (comments)    Vicodin [Hydrocodone-Acetaminophen] Unknown (comments)         Review of Systems:  -   - Gastrointestinal: no dysphagia no  abdominal pain  - Musculoskeletal: +  joint pains +   weakness  - Integumentary: no rashes  - Neurological: +  numbness, tingling, no  headaches  - Psychiatric: no depression no  anxiety  - Endocrine: no heat or cold intolerance    Physical Examination:   Blood pressure 106/56, pulse 85, temperature 96.6 °F (35.9 °C), temperature source Oral, resp. rate 18, height 5' 5\" (1.651 m), weight 218 lb (98.9 kg), SpO2 94 %. Estimated body mass index is 36.28 kg/(m^2) as calculated from the following:    Height as of this encounter: 5' 5\" (1.651 m). -   Weight as of this encounter: 218 lb (98.9 kg).   - General: pleasant, no distress, good eye contact  - HEENT: no pallor, no periorbital edema, EOMI  - Neck: supple, no thyromegaly, no nodules  - Cardiovascular: regular, normal rate, normal S1 and S2  - Respiratory: clear to auscultation bilaterally  - Musculoskeletal: no edema,   - Neurological:alert and oriented  - Psychiatric: normal mood and affect  - Skin: color, texture, turgor normal.       Data Reviewed:     [] Glucose records reviewed. [] See glucose records for details (to be scanned). [] A1C  [] Reviewed labs      Assessment/Plan:     1. Type 2 diabetes mellitus with hyperglycemia, with long-term current use of insulin ([address]    2. Type 2 diabetes mellitus with hyperglycemia, unspecified long term insulin use status    3. Uncontrolled type 2 diabetes mellitus with hyperglycemia, with long-term current use of insulin ([address]        1. Type 2 Diabetes Mellitus       Lab Results   Component Value Date/Time    Hemoglobin A1c 7.6 03/15/2017 09:57 AM    Hemoglobin A1c (POC) 10.6 12/21/2016 11:28 AM    Hemoglobin A1c, External 9.4 08/11/2016    A1C 6.8  Discussed pathophysiology of type 2 diabetes, nutritional classes discussed. Lantus BID, Apidra 35 units before each meal.   Decreased the prandial insulin  Stressed the importance of eating a healthy diet. DM classes    Metformin 1000 mg BID   ASA    There are no Patient Instructions on file for this visit. FLU annually ,Pneumovax ,aspirin daily,annual eye exam,microalbumin    2. HTN : Continue current therapy     3. Hyperlipidemia : Continue statin. 4.Obesity:Body mass index is 36.28 kg/(m^2). Discussed about the importance of exercise and carbohydrate portion control. There are no Patient Instructions on file for this visit. Follow-up Disposition: Not on File    Thank you for allowing me to participate in the care of this patient.     Nakul Herrera MD

## 2017-06-22 NOTE — PATIENT INSTRUCTIONS
Check blood sugars before meals/breakfast and at bedtime. Low blood glucose is less than 70     Maintain the log and bring it all your appointments    If the bedtime sugars are less than 100 ,eat a 15 gm snack. Continue Actos , Metformin     Lantus  60   units in AM and 40 units at bed time    Novolog or Apidra insulin 35  units before breakfast, 35 units before lunch and 35units before dinner.    No Apidra if sugars are less than 70     Additional Novolog or Humalog or Apidra  for high blood sugars     150-200 mg   3 units   201-250 mg   6 units   251-300 mg   9 units   301-350 mg   12units   351-400 mg   15 units

## 2017-06-22 NOTE — PROGRESS NOTES
Visit Vitals    /56    Pulse 85    Temp 96.6 °F (35.9 °C) (Oral)    Resp 18    Ht 5' 5\" (1.651 m)    Wt 218 lb (98.9 kg)    SpO2 94%    BMI 36.28 kg/m2     Chief Complaint   Patient presents with    Diabetes

## 2017-06-27 ENCOUNTER — TELEPHONE (OUTPATIENT)
Dept: ENDOCRINOLOGY | Age: 67
End: 2017-06-27

## 2017-06-27 NOTE — TELEPHONE ENCOUNTER
Last visit I had decreased the dose of meal time insulin  to 35 units before each meal , if she still has low sugars which is less than 70 decrease to 30 unit before each meal

## 2017-06-27 NOTE — TELEPHONE ENCOUNTER
----- Message from Eugenio London sent at 6/27/2017 12:19 PM EDT -----  Regarding: Dr. Adrián Ellsworth: [phone]  Pt is requesting a callback because pt was told to notify doctor when sugar levels fluctuated. The best contact number is [phone].

## 2017-06-27 NOTE — TELEPHONE ENCOUNTER
Spoke with patient. She did not have meter available. Was able to give me two blood sugars from today BB 87 . Patient stated blood sugar dropped to 68 Sunday night around 10pm. Discussed with patient importance of drinking juice when below 70 and rechecking within 15 minutes. Patient verbalized understanding. Patient will call back when meter readings are available.

## 2017-06-27 NOTE — TELEPHONE ENCOUNTER
Spoke with patient and gave her 's recommendation of decreasing meal time insulin to 30 units if blood sugars continue to go below 70. Patient verbalized understanding.

## 2017-06-30 ENCOUNTER — TELEPHONE (OUTPATIENT)
Dept: ENDOCRINOLOGY | Age: 67
End: 2017-06-30

## 2017-06-30 NOTE — TELEPHONE ENCOUNTER
Clarified once again patient instructions per . Patient verbalized understanding. Explained to patient daughter has everything written down as well.

## 2017-06-30 NOTE — TELEPHONE ENCOUNTER
Spoke with patient's daughter and she stated they are not sure if patient decreased humalog at this point but will make sure it is decreased to 30 units now. Instructed her that lantus is to be decreased to 50 units in the morning and 40 units at night and to call back if blood sugars continue to be low to make an appointment. Verbalized understanding.

## 2017-06-30 NOTE — TELEPHONE ENCOUNTER
Patient called and this am 77 was her blood sugar reading, then she took again now it is 86.  Please call thank you

## 2017-06-30 NOTE — TELEPHONE ENCOUNTER
----- Message from [employer] [employer] sent at 6/30/2017  1:43 PM EDT -----  Regarding: /Telephone   Pt returning call from practice did not understand the message about her sugar.  [facility]  [phone]

## 2017-06-30 NOTE — TELEPHONE ENCOUNTER
Spoke with patient's daughter Katty Ramon verified Nhan. She stated patient's blood sugars continue to run low. Did not have exact readings. Explained patient instructions to her and she said  She would get exact readings and call back. Blood sugar this morning was 77 and it came up to 86. She stated patient was feeling very shaky last night before bed and patient ate a snack. Could not verify if patient had decreased her meal time insulin to 30 units as patient instructions directed when blood sugars continue to run low. Verbalized understanding of patient instructions now.

## 2017-06-30 NOTE — TELEPHONE ENCOUNTER
PLease talk to pt and confirm if she has decreased Humalog to 30 units before each meal     Also decrease Lantus to 50 units in AM , 40 units PM     If she continues to have low sugars ask her to make an appointment with me

## 2017-07-06 ENCOUNTER — TELEPHONE (OUTPATIENT)
Dept: ENDOCRINOLOGY | Age: 67
End: 2017-07-06

## 2017-07-06 RX ORDER — PIOGLITAZONEHYDROCHLORIDE 30 MG/1
TABLET ORAL
Qty: 90 TAB | Refills: 4 | Status: SHIPPED | OUTPATIENT
Start: 2017-07-06 | End: 2018-01-30 | Stop reason: SDUPTHER

## 2017-07-21 NOTE — TELEPHONE ENCOUNTER
Spoke with patient and she stated she is having some lower blood sugars this week. Blood sugars are as follows:  07/20  BL 80 BD 92 bedtime 125  07/21 0400 72 0700 71  Patient state she did not take insulin when blood sugar was 71. Patient says she is taking lantus 50 units in the morning and 40 units at night. humalog 30 units with each meal plus sliding scale. Patient is taking metformin 2 times daily with meals and taking actos once daily. Please advise.

## 2017-07-21 NOTE — TELEPHONE ENCOUNTER
----- Message from Sera Gallagher sent at 7/21/2017  9:25 AM EDT -----  Regarding: Dr. Tan Flor  Pt is requesting a callback in regards to the pt's sugar dropping , pt stated on 07/20/17 the morning it was 112 to, then lunch it was 80 ,then evening it was 92 and bedtime it was 125, pt stated at 4 this morning it was 72 and at 7 in the morning it went down to 71 and pt wants to inform the doctor.  Best contact [phone]

## 2017-07-27 NOTE — TELEPHONE ENCOUNTER
Informed pt and pt's daughter of Dr. Gutiérrez General note. They verbalized understanding with no further questions or concerns at this time.

## 2017-07-27 NOTE — TELEPHONE ENCOUNTER
It is good that sugars are better and we have to cut the dose     Humalog 25 units before each meal ,     Lantus 40 units in AM and 40 units PM

## 2017-09-05 ENCOUNTER — OP HISTORICAL/CONVERTED ENCOUNTER (OUTPATIENT)
Dept: OTHER | Age: 67
End: 2017-09-05

## 2017-10-02 ENCOUNTER — OFFICE VISIT (OUTPATIENT)
Dept: ENDOCRINOLOGY | Age: 67
End: 2017-10-02

## 2017-10-02 VITALS
BODY MASS INDEX: 34.82 KG/M2 | WEIGHT: 209 LBS | TEMPERATURE: 96.5 F | RESPIRATION RATE: 16 BRPM | HEART RATE: 83 BPM | HEIGHT: 65 IN | DIASTOLIC BLOOD PRESSURE: 70 MMHG | OXYGEN SATURATION: 95 % | SYSTOLIC BLOOD PRESSURE: 97 MMHG

## 2017-10-02 DIAGNOSIS — I10 ESSENTIAL HYPERTENSION WITH GOAL BLOOD PRESSURE LESS THAN 140/90: ICD-10-CM

## 2017-10-02 DIAGNOSIS — E11.65 TYPE 2 DIABETES MELLITUS WITH HYPERGLYCEMIA, WITH LONG-TERM CURRENT USE OF INSULIN (HCC): Primary | ICD-10-CM

## 2017-10-02 DIAGNOSIS — Z79.4 TYPE 2 DIABETES MELLITUS WITH HYPERGLYCEMIA, WITH LONG-TERM CURRENT USE OF INSULIN (HCC): Primary | ICD-10-CM

## 2017-10-02 DIAGNOSIS — E78.2 MIXED HYPERLIPIDEMIA: ICD-10-CM

## 2017-10-02 LAB
GLUCOSE POC: 111 MG/DL
HBA1C MFR BLD HPLC: 6.9 %

## 2017-10-02 NOTE — PROGRESS NOTES
[facility] / [facility] AND ENDOCRINOLOGY               Alayna Alcantar MD        [address] 78 [phone] Fax [phone]               Patient Information  Date:10/2/2017  Name : Rochelle Lee 78 y.o.     YOB: 1950         Referred by: Donice Favre, MD         Chief Complaint   Patient presents with    Diabetes       History of Present Illness: Rochelle Lee is a 78 y.o. female here for fu of  Type 2 Diabetes Mellitus. Referred by Donice Favre, MD for evaluation and management of uncontrolled diabetes mellitus. She was diagnosed with diabetes in 2008. She is checking blood glucose 3- 4 times daily. Reviewed log   She is doing very well   no hypos   Diet healthy     Lost weight     Has decreased insulin - no edema        Wt Readings from Last 3 Encounters:   10/02/17 209 lb (94.8 kg)   06/22/17 218 lb (98.9 kg)   03/22/17 221 lb 6.4 oz (100.4 kg)       BP Readings from Last 3 Encounters:   10/02/17 97/70   06/22/17 106/56   03/22/17 110/56           Past Medical History:   Diagnosis Date    Arthritis     [facility]     Diabetes ([facility]     Hyperlipidemia     Hypertension     Seizure ([facility]      Current Outpatient Prescriptions   Medication Sig    pioglitazone (ACTOS) 30 mg tablet TAKE ONE (1) TABLET, BY MOUTH, DAILY.  azelastine (OPTIVAR) 0.05 % ophthalmic solution     RESTASIS 0.05 % ophthalmic emulsion     famotidine (PEPCID) 20 mg tablet     ULTICARE 1 mL 31 gauge x 5/16 syrg     trospium (SANCTURA) 20 mg tablet Take 20 mg by mouth.     insulin glargine (LANTUS) 100 unit/mL injection Inject 60 units in AM and 40 units  at bed time (Patient taking differently: Inject 40 units in AM and 40 units  at bed time)    insulin lispro (HUMALOG KWIKPEN) 100 unit/mL kwikpen 35 units before each meal along with sliding scale Max daily units 150 Stop Apidra (Patient taking differently: 25 units before each meal along with sliding scale Max daily units 150 Stop Apidra)    pioglitazone (ACTOS) 30 mg tablet TAKE ONE (1) TABLET, BY MOUTH, DAILY.  metFORMIN (GLUCOPHAGE) 1,000 mg tablet Take 1 Tab by mouth two (2) times daily (with meals).  glucose blood VI test strips (TRUE METRIX GLUCOSE TEST STRIP) strip by Does Not Apply route See Admin Instructions. Use to check blood glucose 4x daily. Dx code: E11.65    Insulin Needles, Disposable, (FAITH PEN NEEDLE) 32 gauge x 5/32\" ndle Four times a day Dx Code: E11.65    insulin syringe-needle U-100 1 mL 31 gauge x 15/64\" syrg 1 Syringe by Does Not Apply route two (2) times a day. Dx code: E11.65    VITAMIN D2 50,000 unit capsule Take 50,000 Units by mouth. Every wed and sat    furosemide (LASIX) 40 mg tablet Take 40 mg by mouth daily.  atorvastatin (LIPITOR) 40 mg tablet Take  by mouth daily.  lisinopril (PRINIVIL, ZESTRIL) 5 mg tablet Take 5 mg by mouth daily.  loratadine (CLARITIN) 10 mg tablet Take 10 mg by mouth daily.  levETIRAcetam (KEPPRA) 500 mg tablet Take  by mouth two (2) times a day.  aspirin delayed-release 81 mg tablet Take  by mouth daily.  potassium chloride (KAON 10%) 20 mEq/15 mL solution Take  by mouth daily.  nabumetone (RELAFEN) 500 mg tablet Take 500 mg by mouth two (2) times a day. No current facility-administered medications for this visit. Allergies   Allergen Reactions    Oxycodone-Acetaminophen Unknown (comments)    Vicodin [Hydrocodone-Acetaminophen] Unknown (comments)         Review of Systems:  -   - Gastrointestinal: no dysphagia no  abdominal pain  - Musculoskeletal: +  joint pains +   weakness  - Integumentary: no rashes  - Neurological: +  numbness, tingling, no  headaches  - Psychiatric: no depression no  anxiety  - Endocrine: no heat or cold intolerance    Physical Examination:   Blood pressure 97/70, pulse 83, temperature 96.5 °F (35.8 °C), temperature source Oral, resp.  rate 16, height 5' 5\" (1.651 m), weight 209 lb (94.8 kg), SpO2 95 %. Estimated body mass index is 34.78 kg/(m^2) as calculated from the following:    Height as of this encounter: 5' 5\" (1.651 m). -   Weight as of this encounter: 209 lb (94.8 kg). - General: pleasant, no distress, good eye contact  - HEENT: no pallor, no periorbital edema, EOMI  - Neck: supple, no thyromegaly, no nodules  - Cardiovascular: regular, normal rate, normal S1 and S2  - Respiratory: clear to auscultation bilaterally  - Musculoskeletal: no edema,   - Neurological:alert and oriented  - Psychiatric: normal mood and affect  - Skin: color, texture, turgor normal.       Data Reviewed:     [] Glucose records reviewed. [] See glucose records for details (to be scanned). [] A1C  [] Reviewed labs      Assessment/Plan:     1. Type 2 diabetes mellitus with hyperglycemia, with long-term current use of insulin ([address]    2. Essential hypertension with goal blood pressure less than 140/90    3. Mixed hyperlipidemia        1. Type 2 Diabetes Mellitus       Lab Results   Component Value Date/Time    Hemoglobin A1c 7.6 03/15/2017 09:57 AM    Hemoglobin A1c (POC) 6.9 10/02/2017 11:13 AM    Hemoglobin A1c, External 9.4 08/11/2016       Controlled   Discussed pathophysiology of type 2 diabetes, nutritional classes discussed. Lantus BID, Apidra 25 units before each meal.   Stressed the importance of eating a healthy diet. DM classes  Actos  Metformin 1000 mg BID   ASA    Patient Instructions   Check blood sugars before meals/breakfast and at bedtime. Low blood glucose is less than 70     Maintain the log and bring it all your appointments    If the bedtime sugars are less than 100 ,eat a 15 gm snack. Continue Actos , Metformin     Lantus  40   units in AM and 40 units at bed time    Novolog or Apidra insulin 25  units before breakfast, 20 units before lunch and 25units before dinner.    No Apidra if sugars are less than 70     Additional Novolog or Humalog or Apidra  for high blood sugars     150-200 mg   3 units   201-250 mg   6 units   251-300 mg   9 units   301-350 mg   12units   351-400 mg   15 units           FLU annually ,Pneumovax ,aspirin daily,annual eye exam,microalbumin    2. HTN : Continue current therapy , take lasix every other day due to low BP     3. Hyperlipidemia : Continue statin. 4.Obesity:Body mass index is 34.78 kg/(m^2). Discussed about the importance of exercise and carbohydrate portion control. Patient Instructions   Check blood sugars before meals/breakfast and at bedtime. Low blood glucose is less than 70     Maintain the log and bring it all your appointments    If the bedtime sugars are less than 100 ,eat a 15 gm snack. Continue Actos , Metformin     Lantus  40   units in AM and 40 units at bed time    Novolog or Apidra insulin 25  units before breakfast, 20 units before lunch and 25units before dinner. No Apidra if sugars are less than 70     Additional Novolog or Humalog or Apidra  for high blood sugars     150-200 mg   3 units   201-250 mg   6 units   251-300 mg   9 units   301-350 mg   12units   351-400 mg   15 units           Follow-up Disposition: Not on File    Thank you for allowing me to participate in the care of this patient.     Renie Hatchet, MD

## 2017-10-02 NOTE — MR AVS SNAPSHOT
Visit Information Date & Time Provider Department Dept. Phone Encounter #  
 10/2/2017 10:45 AM Hector Holcomb MD [facility] [phone] [id number] Upcoming Health Maintenance Date Due Hepatitis C Screening 1950 FOOT EXAM Q1 7/28/1961 EYE EXAM RETINAL OR DILATED Q1 7/28/1961 DTaP/Tdap/Td series (1 - Tdap) 7/28/1972 BREAST CANCER SCRN MAMMOGRAM 7/28/2001 FOBT Q 1 YEAR AGE 50-75 7/28/2001 ZOSTER VACCINE AGE 60> 5/28/2011 GLAUCOMA SCREENING Q2Y 7/28/2016 OSTEOPOROSIS SCREENING (DEXA) 7/28/2016 Pneumococcal 65+ High/Highest Risk (1 of 2 - PCV13) 7/28/2016 MEDICARE YEARLY EXAM 7/28/2016 INFLUENZA AGE 9 TO ADULT 8/1/2017 HEMOGLOBIN A1C Q6M 9/15/2017 MICROALBUMIN Q1 3/15/2018 LIPID PANEL Q1 3/15/2018 Allergies as of 10/2/2017  Review Complete On: 10/2/2017 By: Lucia Medina LPN Severity Noted Reaction Type Reactions Oxycodone-acetaminophen  06/12/2017    Unknown (comments) Vicodin [Hydrocodone-acetaminophen]  03/13/2015    Unknown (comments) Current Immunizations  Never Reviewed No immunizations on file. Not reviewed this visit You Were Diagnosed With   
  
 Codes Comments Type 2 diabetes mellitus with hyperglycemia, with long-term current use of insulin (HCC)    -  Primary ICD-10-CM: E11.65, Z79.4 ICD-9-CM: 250.00, 790.29, V58.67 Essential hypertension with goal blood pressure less than 140/90     ICD-10-CM: I10 
ICD-9-CM: 401.9 Mixed hyperlipidemia     ICD-10-CM: E78.2 ICD-9-CM: 272.2 Vitals BP Pulse Temp Resp Height(growth percentile) Weight(growth percentile) 97/70 (BP 1 Location: Right arm, BP Patient Position: Sitting) 83 96.5 °F (35.8 °C) (Oral) 16 5' 5\" (1.651 m) 209 lb (94.8 kg) SpO2 BMI OB Status Smoking Status 95% 34.78 kg/m2 Postmenopausal Former Smoker Vitals History BMI and BSA Data Body Mass Index Body Surface Area 34.78 kg/m 2 2.09 m 2 Preferred Pharmacy Pharmacy Name Phone [facility] [address] [phone] Your Updated Medication List  
  
   
This list is accurate as of: 10/2/17 11:26 AM.  Always use your most recent med list.  
  
  
  
  
 aspirin delayed-release 81 mg tablet Take  by mouth daily. atorvastatin 40 mg tablet Commonly known as:  LIPITOR Take  by mouth daily. azelastine 0.05 % ophthalmic solution Commonly known as:  OPTIVAR  
  
 famotidine 20 mg tablet Commonly known as:  PEPCID  
  
 furosemide 40 mg tablet Commonly known as:  LASIX Take 40 mg by mouth daily. insulin glargine 100 unit/mL injection Commonly known as:  LANTUS Inject 60 units in AM and 40 units  at bed time  
  
 insulin lispro 100 unit/mL kwikpen Commonly known as:  HumaLOG KwikPen 35 units before each meal along with sliding scale Max daily units 150 Stop Apidra Insulin Needles (Disposable) 32 gauge x 5/32\" Ndle Commonly known as:  Janelle Pen Needle Four times a day Dx Code: E11.65  
  
 * insulin syringe-needle U-100 1 mL 31 gauge x 15/64\" Syrg 1 Syringe by Does Not Apply route two (2) times a day. Dx code: E11.65  
  
 * ULTICARE 1 mL 31 gauge x 5/16 Syrg Generic drug:  Insulin Syringe-Needle U-100  
  
 levETIRAcetam 500 mg tablet Commonly known as:  KEPPRA Take  by mouth two (2) times a day. lisinopril 5 mg tablet Commonly known as:  Lee Ann Justyn Take 5 mg by mouth daily. loratadine 10 mg tablet Commonly known as:  Rocio Dustman Take 10 mg by mouth daily. metFORMIN 1,000 mg tablet Commonly known as:  GLUCOPHAGE Take 1 Tab by mouth two (2) times daily (with meals). nabumetone 500 mg tablet Commonly known as:  RELAFEN Take 500 mg by mouth two (2) times a day. * pioglitazone 30 mg tablet Commonly known as:  ACTOS  
TAKE ONE (1) TABLET, BY MOUTH, DAILY. * pioglitazone 30 mg tablet Commonly known as:  ACTOS  
TAKE ONE (1) TABLET, BY MOUTH, DAILY. potassium chloride 20 mEq/15 mL solution Commonly known as:  KAON 10% Take  by mouth daily. RESTASIS 0.05 % ophthalmic emulsion Generic drug:  cycloSPORINE  
  
 trospium 20 mg tablet Commonly known as:  Alison [place] Take 20 mg by mouth. TRUE METRIX GLUCOSE TEST STRIP strip Generic drug:  glucose blood VI test strips  
by Does Not Apply route See Admin Instructions. Use to check blood glucose 4x daily. Dx code: E11.65 VITAMIN D2 50,000 unit capsule Generic drug:  ergocalciferol Take 50,000 Units by mouth. Every wed and sat * Notice: This list has 4 medication(s) that are the same as other medications prescribed for you. Read the directions carefully, and ask your doctor or other care provider to review them with you. We Performed the Following AMB POC GLUCOSE, QUANTITATIVE, BLOOD [20110 CPT(R)] AMB POC HEMOGLOBIN A1C [91960 CPT(R)] Patient Instructions Check blood sugars before meals/breakfast and at bedtime. Low blood glucose is less than 70 Maintain the log and bring it all your appointments If the bedtime sugars are less than 100 ,eat a 15 gm snack. Continue Actos , Metformin Lantus  40   units in AM and 40 units at bed time Novolog or Apidra insulin 25  units before breakfast, 20 units before lunch and 25units before dinner. No Apidra if sugars are less than 70 Additional Novolog or Humalog or Apidra  for high blood sugars 150-200 mg   3 units 201-250 mg   6 units 251-300 mg   9 units 301-350 mg   12units 351-400 mg   15 units Introducing [facility]! Yolanda Hernandez introduces [employer] patient portal. Now you can access parts of your medical record, email your doctor's office, and request medication refills online. 1. In your internet browser, go to [email or web address] 2. Click on the First Time User? Click Here link in the Sign In box. You will see the New Member Sign Up page. 3. Enter your [employer] Access Code exactly as it appears below. You will not need to use this code after youve completed the sign-up process. If you do not sign up before the expiration date, you must request a new code. · [employer] Access Code: [email or web address] Expires: 12/31/2017 11:26 AM 
 
4. Enter the last four digits of your Social Security Number (xxxx) and Date of Birth (mm/dd/yyyy) as indicated and click Submit. You will be taken to the next sign-up page. 5. Create a SitScape ID. This will be your SitScape login ID and cannot be changed, so think of one that is secure and easy to remember. 6. Create a Sit[employer]ape password. You can change your password at any time. 7. Enter your Password Reset Question and Answer. This can be used at a later time if you forget your password. 8. Enter your e-mail address. You will receive e-mail notification when new information is available in [address] 9. Click Sign Up. You can now view and download portions of your medical record. 10. Click the Download Summary menu link to download a portable copy of your medical information. If you have questions, please visit the Frequently Asked Questions section of the [employer] website. Remember, [employer]ape is NOT to be used for urgent needs. For medical emergencies, dial 911. Now available from your iPhone and Android! Please provide this summary of care documentation to your next provider. Your primary care clinician is listed as Yaima Beach. If you have any questions after today's visit, please call [phone].

## 2017-10-02 NOTE — PATIENT INSTRUCTIONS
Check blood sugars before meals/breakfast and at bedtime. Low blood glucose is less than 70     Maintain the log and bring it all your appointments    If the bedtime sugars are less than 100 ,eat a 15 gm snack. Continue Actos , Metformin     Lantus  40   units in AM and 40 units at bed time    Novolog or Apidra insulin 25  units before breakfast, 20 units before lunch and 25units before dinner.    No Apidra if sugars are less than 70     Additional Novolog or Humalog or Apidra  for high blood sugars     150-200 mg   3 units   201-250 mg   6 units   251-300 mg   9 units   301-350 mg   12units   351-400 mg   15 units

## 2017-10-02 NOTE — PROGRESS NOTES
Andrea Moreno is a 78 y.o. female here for   Chief Complaint   Patient presents with    Diabetes       Functional glucose monitor and record keeping system? - yes  Eye exam within last year? - Sept 2017  Foot exam within last year? - Sept 2017    1. Have you been to the ER, urgent care clinic since your last visit? Hospitalized since your last visit? -no    2. Have you seen or consulted any other health care providers outside of the [address] [place] [address] since your last visit? Include any pap smears or colon screening. -PCP      Lab Results   Component Value Date/Time    Hemoglobin A1c 7.6 03/15/2017 09:57 AM    Hemoglobin A1c (POC) 10.6 12/21/2016 11:28 AM    Hemoglobin A1c, External 9.4 08/11/2016       Wt Readings from Last 3 Encounters:   06/22/17 218 lb (98.9 kg)   03/22/17 221 lb 6.4 oz (100.4 kg)   12/21/16 222 lb (100.7 kg)     Temp Readings from Last 3 Encounters:   06/22/17 96.6 °F (35.9 °C) (Oral)   03/22/17 96.1 °F (35.6 °C) (Oral)   12/21/16 96.2 °F (35.7 °C) (Oral)     BP Readings from Last 3 Encounters:   06/22/17 106/56   03/22/17 110/56   12/21/16 126/56     Pulse Readings from Last 3 Encounters:   06/22/17 85   03/22/17 73   12/21/16 67

## 2017-10-04 ENCOUNTER — TELEPHONE (OUTPATIENT)
Dept: ENDOCRINOLOGY | Age: 67
End: 2017-10-04

## 2017-10-04 NOTE — TELEPHONE ENCOUNTER
----- Message from Salome Mcguire sent at 10/4/2017  8:57 AM EDT -----  Regarding: Dr. Claudeen Munson  Pt is requesting a call back to discuss how many units of insulin she is suppose to take based off if her blood sugar readings daily. Best contact number [phone].

## 2017-10-05 NOTE — TELEPHONE ENCOUNTER
Went over sliding scale with pt and mailed copy of pt instructions to home address. Pt verbalized understanding.

## 2017-11-02 ENCOUNTER — TELEPHONE (OUTPATIENT)
Dept: ENDOCRINOLOGY | Age: 67
End: 2017-11-02

## 2017-11-02 NOTE — TELEPHONE ENCOUNTER
Per Dr. Carolina Lewis, informed pt to decrease lantus to 30 units in the AM and 30 units in the PM and novolog/humalog to 20 units [facility] with SSI. Pt repeated information back and verbalized understanding with no further questions or concerns at this time.

## 2017-11-02 NOTE — TELEPHONE ENCOUNTER
Patient states her blood sugar are low in 70 and 80.  Would like to speak with nurse about taking her medication

## 2017-11-03 ENCOUNTER — TELEPHONE (OUTPATIENT)
Dept: ENDOCRINOLOGY | Age: 67
End: 2017-11-03

## 2017-11-03 DIAGNOSIS — Z79.4 TYPE 2 DIABETES MELLITUS WITH HYPERGLYCEMIA, WITH LONG-TERM CURRENT USE OF INSULIN (HCC): Primary | ICD-10-CM

## 2017-11-03 DIAGNOSIS — E11.65 TYPE 2 DIABETES MELLITUS WITH HYPERGLYCEMIA, WITH LONG-TERM CURRENT USE OF INSULIN (HCC): Primary | ICD-10-CM

## 2017-11-24 ENCOUNTER — TELEPHONE (OUTPATIENT)
Dept: ENDOCRINOLOGY | Age: 67
End: 2017-11-24

## 2017-11-24 DIAGNOSIS — E11.65 TYPE 2 DIABETES MELLITUS WITH HYPERGLYCEMIA, UNSPECIFIED LONG TERM INSULIN USE STATUS: ICD-10-CM

## 2017-11-24 RX ORDER — INSULIN GLARGINE 100 [IU]/ML
INJECTION, SOLUTION SUBCUTANEOUS
Qty: 60 ML | Refills: 5 | Status: SHIPPED | OUTPATIENT
Start: 2017-11-24 | End: 2018-02-20 | Stop reason: SDUPTHER

## 2017-12-07 NOTE — TELEPHONE ENCOUNTER
----- Message from Lexi Ann sent at 12/7/2017  8:36 AM EST -----  Regarding: Dr. Sangeeta Lira / Telephone  Pt is requesting Clarification on how to take her medication and best contact number is [phone].

## 2017-12-07 NOTE — TELEPHONE ENCOUNTER
Pt asked if she should take lantus with lunch. Informed pt that she is to take Lantus in the AM and at bedtime. Pt states BG was 85 before breakfast. Informed her she is to take 25 units of meal time insulin, pt states she now takes 20 units. Informed her if BG is below 150 she does not have to use the SSI. Pt verbalized understanding.

## 2017-12-28 DIAGNOSIS — E11.65 TYPE 2 DIABETES MELLITUS WITH HYPERGLYCEMIA, WITH LONG-TERM CURRENT USE OF INSULIN (HCC): ICD-10-CM

## 2017-12-28 DIAGNOSIS — Z79.4 UNCONTROLLED TYPE 2 DIABETES MELLITUS WITH HYPERGLYCEMIA, WITH LONG-TERM CURRENT USE OF INSULIN (HCC): ICD-10-CM

## 2017-12-28 DIAGNOSIS — E11.65 UNCONTROLLED TYPE 2 DIABETES MELLITUS WITH HYPERGLYCEMIA, WITH LONG-TERM CURRENT USE OF INSULIN (HCC): ICD-10-CM

## 2017-12-28 DIAGNOSIS — Z79.4 TYPE 2 DIABETES MELLITUS WITH HYPERGLYCEMIA, WITH LONG-TERM CURRENT USE OF INSULIN (HCC): ICD-10-CM

## 2017-12-28 RX ORDER — PEN NEEDLE, DIABETIC 32GX 5/32"
NEEDLE, DISPOSABLE MISCELLANEOUS
Qty: 150 PEN NEEDLE | Refills: 6 | Status: SHIPPED | OUTPATIENT
Start: 2017-12-28 | End: 2019-01-08 | Stop reason: SDUPTHER

## 2018-01-29 DIAGNOSIS — E11.65 UNCONTROLLED TYPE 2 DIABETES MELLITUS WITH HYPERGLYCEMIA, WITH LONG-TERM CURRENT USE OF INSULIN (HCC): ICD-10-CM

## 2018-01-29 DIAGNOSIS — Z79.4 UNCONTROLLED TYPE 2 DIABETES MELLITUS WITH HYPERGLYCEMIA, WITH LONG-TERM CURRENT USE OF INSULIN (HCC): ICD-10-CM

## 2018-01-29 NOTE — TELEPHONE ENCOUNTER
----- Message from Praful Smith sent at 1/26/2018  2:59 PM EST -----  Regarding: Dr. Arina Snyder, Daughter, is requesting a call back regarding Lab orders. Best contact [phone].

## 2018-01-30 RX ORDER — PIOGLITAZONEHYDROCHLORIDE 30 MG/1
TABLET ORAL
Qty: 90 TAB | Refills: 3 | Status: SHIPPED | OUTPATIENT
Start: 2018-01-30 | End: 2019-01-31 | Stop reason: SDUPTHER

## 2018-02-12 LAB — HBA1C MFR BLD HPLC: 7.6 %

## 2018-02-13 LAB — MICROALBUMIN UR TEST STR-MCNC: 10 MG/DL

## 2018-02-20 ENCOUNTER — OFFICE VISIT (OUTPATIENT)
Dept: ENDOCRINOLOGY | Age: 68
End: 2018-02-20

## 2018-02-20 VITALS
WEIGHT: 212.6 LBS | DIASTOLIC BLOOD PRESSURE: 59 MMHG | RESPIRATION RATE: 16 BRPM | BODY MASS INDEX: 35.42 KG/M2 | SYSTOLIC BLOOD PRESSURE: 117 MMHG | TEMPERATURE: 96.9 F | HEIGHT: 65 IN | OXYGEN SATURATION: 96 % | HEART RATE: 78 BPM

## 2018-02-20 DIAGNOSIS — Z79.4 TYPE 2 DIABETES MELLITUS WITH HYPERGLYCEMIA, WITH LONG-TERM CURRENT USE OF INSULIN (HCC): Primary | ICD-10-CM

## 2018-02-20 DIAGNOSIS — I10 ESSENTIAL HYPERTENSION WITH GOAL BLOOD PRESSURE LESS THAN 140/90: ICD-10-CM

## 2018-02-20 DIAGNOSIS — E78.2 MIXED HYPERLIPIDEMIA: ICD-10-CM

## 2018-02-20 DIAGNOSIS — E11.65 TYPE 2 DIABETES MELLITUS WITH HYPERGLYCEMIA, WITH LONG-TERM CURRENT USE OF INSULIN (HCC): Primary | ICD-10-CM

## 2018-02-20 RX ORDER — INSULIN LISPRO 100 [IU]/ML
INJECTION, SOLUTION INTRAVENOUS; SUBCUTANEOUS
Qty: 60 ML | Refills: 11 | Status: SHIPPED | OUTPATIENT
Start: 2018-02-20 | End: 2018-06-27

## 2018-02-20 RX ORDER — INSULIN GLARGINE 100 [IU]/ML
INJECTION, SOLUTION SUBCUTANEOUS
Qty: 60 ML | Refills: 11 | Status: SHIPPED | OUTPATIENT
Start: 2018-02-20 | End: 2018-06-27

## 2018-02-20 NOTE — PROGRESS NOTES
[facility] AND ENDOCRINOLOGY               Jimmy Stern MD        [address] 78 [phone] Fax [phone]               Patient Information  Date:2/20/2018  Name : Radha Weems 78 y.o.     YOB: 1950         Referred by: Rosemary Reina MD         Chief Complaint   Patient presents with    Diabetes       History of Present Illness: Radha Weems is a 78 y.o. female here for fu of  Type 2 Diabetes Mellitus. Referred by Rosemary Reina MD for evaluation and management of uncontrolled diabetes mellitus. She was diagnosed with diabetes in 2008. She was not paying anything for the insulin in the past, now she is being $20 for the insulin and she is not very happy with the pharmacy and insurance  Insulin dose has been decreased. She has maintained a very good log    She is checking blood glucose 3- 4 times daily. Reviewed log   She is doing very well   no hypos   Diet healthy     No chest pain, shortness of breath      Wt Readings from Last 3 Encounters:   02/20/18 212 lb 9.6 oz (96.4 kg)   10/02/17 209 lb (94.8 kg)   06/22/17 218 lb (98.9 kg)       BP Readings from Last 3 Encounters:   02/20/18 117/59   10/02/17 97/70   06/22/17 106/56           Past Medical History:   Diagnosis Date    Arthritis     [facility]     Diabetes ([facility]     Hyperlipidemia     Hypertension     Seizure ([facility]      Current Outpatient Prescriptions   Medication Sig    pioglitazone (ACTOS) 30 mg tablet TAKE ONE (1) TABLET, BY MOUTH, DAILY.  FAITH PEN NEEDLE 32 gauge x 5/32\" ndle USE 4 TIMES A DAY, AS INSTRUCTED.  insulin glargine (LANTUS) 100 unit/mL injection 30 units in AM and 30 units at bedtime    azelastine (OPTIVAR) 0.05 % ophthalmic solution     RESTASIS 0.05 % ophthalmic emulsion     famotidine (PEPCID) 20 mg tablet     ULTICARE 1 mL 31 gauge x 5/16 syrg     trospium (SANCTURA) 20 mg tablet Take 40 mg by mouth daily.     insulin lispro (HUMALOG KWIKPEN) 100 unit/mL kwikpen 35 units before each meal along with sliding scale Max daily units 150 Stop Apidra (Patient taking differently: 20 units before each meal along with sliding scale Max daily units 150 Stop Apidra)    metFORMIN (GLUCOPHAGE) 1,000 mg tablet Take 1 Tab by mouth two (2) times daily (with meals).  glucose blood VI test strips (TRUE METRIX GLUCOSE TEST STRIP) strip by Does Not Apply route See Admin Instructions. Use to check blood glucose 4x daily. Dx code: E11.65    insulin syringe-needle U-100 1 mL 31 gauge x 15/64\" syrg 1 Syringe by Does Not Apply route two (2) times a day. Dx code: E11.65    VITAMIN D2 50,000 unit capsule Take 50,000 Units by mouth. Every wed and sat    furosemide (LASIX) 40 mg tablet Take 20 mg by mouth daily.  atorvastatin (LIPITOR) 40 mg tablet Take  by mouth daily.  lisinopril (PRINIVIL, ZESTRIL) 5 mg tablet Take 5 mg by mouth daily.  loratadine (CLARITIN) 10 mg tablet Take 10 mg by mouth daily.  levETIRAcetam (KEPPRA) 500 mg tablet Take  by mouth two (2) times a day.  aspirin delayed-release 81 mg tablet Take  by mouth daily.  potassium chloride (KAON 10%) 20 mEq/15 mL solution Take  by mouth daily.  nabumetone (RELAFEN) 500 mg tablet Take 500 mg by mouth two (2) times a day. No current facility-administered medications for this visit. Allergies   Allergen Reactions    Oxycodone-Acetaminophen Unknown (comments)    Vicodin [Hydrocodone-Acetaminophen] Unknown (comments)         Review of Systems:  -   - Gastrointestinal: no dysphagia no  abdominal pain  - Musculoskeletal: +  joint pains +   weakness  - Integumentary: no rashes  - Neurological: +  numbness, tingling, no  headaches  - Psychiatric: no depression no  anxiety  - Endocrine: no heat or cold intolerance    Physical Examination:   Blood pressure 117/59, pulse 78, temperature 96.9 °F (36.1 °C), temperature source Oral, resp.  rate 16, height 5' 5\" (1.651 m), weight 212 lb 9.6 oz (96.4 kg), SpO2 96 %. Estimated body mass index is 35.38 kg/(m^2) as calculated from the following:    Height as of this encounter: 5' 5\" (1.651 m). -   Weight as of this encounter: 212 lb 9.6 oz (96.4 kg). - General: pleasant, no distress, good eye contact  - HEENT: no pallor, no periorbital edema, EOMI  - Neck: supple, no thyromegaly, no nodules  - Cardiovascular: regular, normal rate, normal S1 and S2  - Respiratory: clear to auscultation bilaterally  - Musculoskeletal: no edema,   - Neurological:alert and oriented  - Psychiatric: normal mood and affect  - Skin: color, texture, turgor normal.   Diabetic foot exam: February 2018    Left:     Vibratory sensation absent   Filament test normal sensation with micro filament   Pulse DP: 1+    Deformities: None  Right:    Vibratory sensation absent   Filament test normal sensation with micro filament   Pulse DP: 1+   Deformities: None        Data Reviewed:     [] Glucose records reviewed. [] See glucose records for details (to be scanned). [] A1C  [] Reviewed labs      Assessment/Plan:     No diagnosis found. 1. Type 2 Diabetes Mellitus       Lab Results   Component Value Date/Time    Hemoglobin A1c 7.6 (H) 03/15/2017 09:57 AM    Hemoglobin A1c (POC) 6.9 10/02/2017 11:13 AM    Hemoglobin A1c, External 9.4 08/11/2016   A1c according to her at PCPs office was 7.6  Reviewed BMP which was normal.  Stable  Discussed pathophysiology of type 2 diabetes, nutritional classes discussed. Lantus 60 units once daily, Apidra 25 units before each meal.   Stressed the importance of eating a healthy diet. DM classes  Actos  Metformin 1000 mg BID   ASA    Patient Instructions   Check blood sugars before meals/breakfast and at bedtime. Low blood glucose is less than 70     Maintain the log and bring it all your appointments    If the bedtime sugars are less than 100 ,eat a 15 gm snack.     Continue Actos , Metformin     Lantus  60 units in AM     Novolog or Apidra insulin 25  units before breakfast, 20 units before lunch and 25units before dinner. No Apidra if sugars are less than 70     Additional Novolog or Humalog or Apidra  for high blood sugars     150-200 mg   3 units   201-250 mg   6 units   251-300 mg   9 units   301-350 mg   12units   351-400 mg   15 units           FLU annually ,Pneumovax ,aspirin daily,annual eye exam,microalbumin    2. HTN : Continue current therapy ,     3. Hyperlipidemia : Continue statin. 4.Obesity:Body mass index is 35.38 kg/(m^2). Discussed about the importance of exercise and carbohydrate portion control. Patient Instructions   Check blood sugars before meals/breakfast and at bedtime. Low blood glucose is less than 70     Maintain the log and bring it all your appointments    If the bedtime sugars are less than 100 ,eat a 15 gm snack. Continue Actos , Metformin     Lantus  60 units in AM     Novolog or Apidra insulin 25  units before breakfast, 20 units before lunch and 25units before dinner. No Apidra if sugars are less than 70     Additional Novolog or Humalog or Apidra  for high blood sugars     150-200 mg   3 units   201-250 mg   6 units   251-300 mg   9 units   301-350 mg   12units   351-400 mg   15 units           Follow-up Disposition: Not on File    Thank you for allowing me to participate in the care of this patient.     Sherin Guzman MD

## 2018-02-20 NOTE — MR AVS SNAPSHOT
[address] [facility] [address] 
[phone] Patient: Radha Weems MRN: [id number]:[id number] Visit Information Date & Time Provider Department Dept. Phone Encounter #  
 2/20/2018 11:30 AM Latrice St MD [facility] Diabetes & Endocrinology [phone] [id number] Follow-up Instructions Return in about 4 months (around 6/20/2018). Upcoming Health Maintenance Date Due Hepatitis C Screening 1950 FOOT EXAM Q1 7/28/1961 EYE EXAM RETINAL OR DILATED Q1 7/28/1961 DTaP/Tdap/Td series (1 - Tdap) 7/28/1972 BREAST CANCER SCRN MAMMOGRAM 7/28/2001 FOBT Q 1 YEAR AGE 50-75 7/28/2001 ZOSTER VACCINE AGE 60> 5/28/2011 GLAUCOMA SCREENING Q2Y 7/28/2016 OSTEOPOROSIS SCREENING (DEXA) 7/28/2016 Pneumococcal 65+ High/Highest Risk (1 of 2 - PCV13) 7/28/2016 MEDICARE YEARLY EXAM 7/28/2016 Influenza Age 5 to Adult 8/1/2017 MICROALBUMIN Q1 3/15/2018 LIPID PANEL Q1 3/15/2018 HEMOGLOBIN A1C Q6M 4/2/2018 Allergies as of 2/20/2018  Review Complete On: 2/20/2018 By: Nik Ko LPN Severity Noted Reaction Type Reactions Oxycodone-acetaminophen  06/12/2017    Unknown (comments) Vicodin [Hydrocodone-acetaminophen]  03/13/2015    Unknown (comments) Current Immunizations  Never Reviewed No immunizations on file. Not reviewed this visit You Were Diagnosed With   
  
 Codes Comments Type 2 diabetes mellitus with hyperglycemia, with long-term current use of insulin (HCC)    -  Primary ICD-10-CM: E11.65, Z79.4 ICD-9-CM: 250.00, 790.29, V58.67 Essential hypertension with goal blood pressure less than 140/90     ICD-10-CM: I10 
ICD-9-CM: 401.9 Type 2 diabetes mellitus with hyperglycemia, unspecified long term insulin use status (HCC)     ICD-10-CM: E11.65 ICD-9-CM: 250.00  Uncontrolled type 2 diabetes mellitus with hyperglycemia, with long-term current use of insulin (HCC)     ICD-10-CM: E11.65, Z79.4 ICD-9-CM: 250.02, V58.67 Vitals BP Pulse Temp Resp Height(growth percentile) Weight(growth percentile) 117/59 (BP 1 Location: Left arm, BP Patient Position: Sitting) 78 96.9 °F (36.1 °C) (Oral) 16 5' 5\" (1.651 m) 212 lb 9.6 oz (96.4 kg) SpO2 BMI OB Status Smoking Status 96% 35.38 kg/m2 Postmenopausal Former Smoker BMI and BSA Data Body Mass Index Body Surface Area  
 35.38 kg/m 2 2.1 m 2 Preferred Pharmacy Pharmacy Name Phone [facility] [facility] [phone] Your Updated Medication List  
  
   
This list is accurate as of: 2/20/18 11:57 AM.  Always use your most recent med list.  
  
  
  
  
 aspirin delayed-release 81 mg tablet Take  by mouth daily. atorvastatin 40 mg tablet Commonly known as:  LIPITOR Take  by mouth daily. azelastine 0.05 % ophthalmic solution Commonly known as:  OPTIVAR  
  
 famotidine 20 mg tablet Commonly known as:  PEPCID  
  
 furosemide 40 mg tablet Commonly known as:  LASIX Take 20 mg by mouth daily. insulin glargine 100 unit/mL injection Commonly known as:  LANTUS U-100 INSULIN  
60 units in AM  
  
 insulin lispro 100 unit/mL kwikpen Commonly known as:  HumaLOG KwikPen Insulin 20  -25  units before each meal along with sliding scale Max daily units 150 * insulin syringe-needle U-100 1 mL 31 gauge x 15/64\" Syrg 1 Syringe by Does Not Apply route two (2) times a day. Dx code: E11.65  
  
 * ULTICARE 1 mL 31 gauge x 5/16 Syrg Generic drug:  Insulin Syringe-Needle U-100  
  
 levETIRAcetam 500 mg tablet Commonly known as:  KEPPRA Take  by mouth two (2) times a day. lisinopril 5 mg tablet Commonly known as:  Татьяна Champ Take 5 mg by mouth daily. loratadine 10 mg tablet Commonly known as:  Ocsme Genera Take 10 mg by mouth daily. metFORMIN 1,000 mg tablet Commonly known as:  GLUCOPHAGE Take 1 Tab by mouth two (2) times daily (with meals). nabumetone 500 mg tablet Commonly known as:  RELAFEN Take 500 mg by mouth two (2) times a day. Janelle Pen Needle 32 gauge x 32\" Ndle Generic drug:  Insulin Needles (Disposable) USE 4 TIMES A DAY, AS INSTRUCTED. pioglitazone 30 mg tablet Commonly known as:  ACTOS  
TAKE ONE (1) TABLET, BY MOUTH, DAILY. potassium chloride 20 mEq/15 mL solution Commonly known as:  KAON 10% Take  by mouth daily. RESTASIS 0.05 % ophthalmic emulsion Generic drug:  cycloSPORINE  
  
 trospium 20 mg tablet Commonly known as:  [place] Take 40 mg by mouth daily. TRUE METRIX GLUCOSE TEST STRIP strip Generic drug:  glucose blood VI test strips  
by Does Not Apply route See Admin Instructions. Use to check blood glucose 4x daily. Dx code: E11.65 VITAMIN D2 50,000 unit capsule Generic drug:  ergocalciferol Take 50,000 Units by mouth. Every wed and sat * Notice: This list has 2 medication(s) that are the same as other medications prescribed for you. Read the directions carefully, and ask your doctor or other care provider to review them with you. Prescriptions Sent to Pharmacy Refills  
 insulin glargine (LANTUS U-100 INSULIN) 100 unit/mL injection 11 Si units in AM  
 Class: Normal  
 Pharmacy: [address] [place] [address] Ph #: [phone]  
 insulin lispro (HUMALOG KWIKPEN INSULIN) 100 unit/mL kwikpen 11 Si  -25  units before each meal along with sliding scale Max daily units 150 Class: Normal  
 Pharmacy: [facility] Ph #: [phone] Follow-up Instructions Return in about 4 months (around 2018). Patient Instructions Check blood sugars before meals/breakfast and at bedtime. Low blood glucose is less than 70 Maintain the log and bring it all your appointments If the bedtime sugars are less than 100 ,eat a 15 gm snack. Continue Actos , Metformin Lantus  60 units in AM , no LAntus at night Novolog or Apidra insulin 20 - 25  units before breakfast, 20 - 25 units before lunch and 20 - 25units before dinner. No Apidra if sugars are less than 70 Additional Novolog or Humalog or Apidra  for high blood sugars 150-200 mg   3 units 201-250 mg   6 units 251-300 mg   9 units 301-350 mg   12units 351-400 mg   15 units Introducing [facility]! [facility] introduces [employer] patient portal. Now you can access parts of your medical record, email your doctor's office, and request medication refills online. 1. In your internet browser, go to [email or web address] 2. Click on the First Time User? Click Here link in the Sign In box. You will see the New Member Sign Up page. 3. Enter your [employer] Access Code exactly as it appears below. You will not need to use this code after youve completed the sign-up process. If you do not sign up before the expiration date, you must request a new code. · [employer] Access Code: [email or web address] Expires: 5/21/2018 11:57 AM 
 
4. Enter the last four digits of your Social Security Number (xxxx) and Date of Birth (mm/dd/yyyy) as indicated and click Submit. You will be taken to the next sign-up page. 5. Create a [employer] ID. This will be your [employer] login ID and cannot be changed, so think of one that is secure and easy to remember. 6. Create a [employer] password. You can change your password at any time. 7. Enter your Password Reset Question and Answer. This can be used at a later time if you forget your password. 8. Enter your e-mail address. You will receive e-mail notification when new information is available in [address] 9. Click Sign Up. You can now view and download portions of your medical record. 10. Click the Download Summary menu link to download a portable copy of your medical information. If you have questions, please visit the Frequently Asked Questions section of the [facility] website. Remember, [facility] is NOT to be used for urgent needs. For medical emergencies, dial 911. Now available from your iPhone and Android! Please provide this summary of care documentation to your next provider. Your primary care clinician is listed as Maxim Reyes. If you have any questions after today's visit, please call [phone].

## 2018-02-20 NOTE — PATIENT INSTRUCTIONS
Check blood sugars before meals/breakfast and at bedtime. Low blood glucose is less than 70     Maintain the log and bring it all your appointments    If the bedtime sugars are less than 100 ,eat a 15 gm snack. Continue Actos , Metformin     Lantus  60 units in AM , no LAntus at night     Novolog or Apidra insulin 20 - 25  units before breakfast, 20 - 25 units before lunch and 20 - 25units before dinner.    No Apidra if sugars are less than 70     Additional Novolog or Humalog or Apidra  for high blood sugars     150-200 mg   3 units   201-250 mg   6 units   251-300 mg   9 units   301-350 mg   12units   351-400 mg   15 units

## 2018-02-20 NOTE — PROGRESS NOTES
Darci Lira is a 78 y.o. female here for   Chief Complaint   Patient presents with    Diabetes       Functional glucose monitor and record keeping system? - yes  Eye exam within last year? - last month Dr. Isha Rico ([facility]) R;[phone]  Foot exam within last year? - due today    1. Have you been to the ER, urgent care clinic since your last visit? Hospitalized since your last visit? -no    2. Have you seen or consulted any other health care providers outside of the [address] [place] [address] since your last visit? Include any pap smears or colon screening. -PCP      Lab Results   Component Value Date/Time    Hemoglobin A1c 7.6 (H) 03/15/2017 09:57 AM    Hemoglobin A1c (POC) 6.9 10/02/2017 11:13 AM    Hemoglobin A1c, External 9.4 08/11/2016       Wt Readings from Last 3 Encounters:   10/02/17 209 lb (94.8 kg)   06/22/17 218 lb (98.9 kg)   03/22/17 221 lb 6.4 oz (100.4 kg)     Temp Readings from Last 3 Encounters:   10/02/17 96.5 °F (35.8 °C) (Oral)   06/22/17 96.6 °F (35.9 °C) (Oral)   03/22/17 96.1 °F (35.6 °C) (Oral)     BP Readings from Last 3 Encounters:   10/02/17 97/70   06/22/17 106/56   03/22/17 110/56     Pulse Readings from Last 3 Encounters:   10/02/17 83   06/22/17 85   03/22/17 73

## 2018-05-23 ENCOUNTER — TELEPHONE (OUTPATIENT)
Dept: ENDOCRINOLOGY | Age: 68
End: 2018-05-23

## 2018-05-23 NOTE — TELEPHONE ENCOUNTER
----- Message from [place] sent at 5/23/2018  1:20 PM EDT -----  Regarding: Dr. Jana Castro  Pt is stating that she will need to get labs done prior to her appt June 6, and is requesting the lab orders to be mailed to her. Best contact number [phone].

## 2018-06-02 LAB
HBA1C MFR BLD HPLC: 8 %
LDL-C, EXTERNAL: 54
MICROALBUMIN UR TEST STR-MCNC: 12.8 MG/DL

## 2018-06-05 LAB — CREATININE, EXTERNAL: 0.62

## 2018-06-06 ENCOUNTER — OFFICE VISIT (OUTPATIENT)
Dept: ENDOCRINOLOGY | Age: 68
End: 2018-06-06

## 2018-06-06 VITALS
SYSTOLIC BLOOD PRESSURE: 137 MMHG | HEART RATE: 64 BPM | DIASTOLIC BLOOD PRESSURE: 62 MMHG | WEIGHT: 212.7 LBS | BODY MASS INDEX: 35.44 KG/M2 | RESPIRATION RATE: 14 BRPM | HEIGHT: 65 IN | TEMPERATURE: 96.7 F | OXYGEN SATURATION: 94 %

## 2018-06-06 DIAGNOSIS — Z79.4 TYPE 2 DIABETES MELLITUS WITH HYPERGLYCEMIA, WITH LONG-TERM CURRENT USE OF INSULIN (HCC): Primary | ICD-10-CM

## 2018-06-06 DIAGNOSIS — E78.2 MIXED HYPERLIPIDEMIA: ICD-10-CM

## 2018-06-06 DIAGNOSIS — E11.65 TYPE 2 DIABETES MELLITUS WITH HYPERGLYCEMIA, WITH LONG-TERM CURRENT USE OF INSULIN (HCC): Primary | ICD-10-CM

## 2018-06-06 DIAGNOSIS — I10 ESSENTIAL HYPERTENSION WITH GOAL BLOOD PRESSURE LESS THAN 140/90: ICD-10-CM

## 2018-06-06 RX ORDER — OXYBUTYNIN CHLORIDE 15 MG/1
10 TABLET, EXTENDED RELEASE ORAL DAILY
COMMUNITY

## 2018-06-06 RX ORDER — MELOXICAM 15 MG/1
15 TABLET ORAL DAILY
COMMUNITY
End: 2021-08-20

## 2018-06-06 RX ORDER — POTASSIUM CHLORIDE 750 MG/1
TABLET, FILM COATED, EXTENDED RELEASE ORAL
COMMUNITY
End: 2018-11-05

## 2018-06-06 NOTE — PROGRESS NOTES
Christine Grossman is a 78 y.o. female here for   Chief Complaint   Patient presents with    Diabetes       Functional glucose monitor and record keeping system? - yes  Eye exam within last year? - on file  Foot exam within last year? - on file    1. Have you been to the ER, urgent care clinic since your last visit? Hospitalized since your last visit? - no    2. Have you seen or consulted any other health care providers outside of the [facility] since your last visit?   Include any pap smears or colon screening.- PCP

## 2018-06-06 NOTE — MR AVS SNAPSHOT
[address] [facility], [facility] [address] 
[phone] Patient: Glen Hurst MRN: [id number] [facility]:[id number] Visit Information Date & Time Provider Department Dept. Phone Encounter #  
 6/6/2018  8:45 AM Herve Gillette MD [facility] End[facility]ology [phone] [id number] Follow-up Instructions Return in about 4 months (around 10/6/2018). Upcoming Health Maintenance Date Due Hepatitis C Screening 1950 DTaP/Tdap/Td series (1 - Tdap) 7/28/1972 BREAST CANCER SCRN MAMMOGRAM 7/28/2001 FOBT Q 1 YEAR AGE 50-75 7/28/2001 ZOSTER VACCINE AGE 60> 5/28/2011 Bone Densitometry (Dexa) Screening 7/28/2016 Pneumococcal 65+ High/Highest Risk (1 of 2 - PCV13) 7/28/2016 Influenza Age 5 to Adult 8/1/2018 HEMOGLOBIN A1C Q6M 8/12/2018 EYE EXAM RETINAL OR DILATED Q1 1/29/2019 MICROALBUMIN Q1 2/13/2019 LIPID PANEL Q1 2/13/2019 FOOT EXAM Q1 2/20/2019 GLAUCOMA SCREENING Q2Y 1/29/2020 Allergies as of 6/6/2018  Review Complete On: 2/20/2018 By: Herve Gillette MD  
  
 Severity Noted Reaction Type Reactions Oxycodone-acetaminophen  06/12/2017    Unknown (comments) Vicodin [Hydrocodone-acetaminophen]  03/13/2015    Unknown (comments) Current Immunizations  Never Reviewed No immunizations on file. Not reviewed this visit Vitals BP Pulse Temp Resp Height(growth percentile) Weight(growth percentile)  
 137/62 (BP 1 Location: Right arm, BP Patient Position: Sitting) 64 96.7 °F (35.9 °C) (Oral) 14 5' 5\" (1.651 m) 212 lb 11.2 oz (96.5 kg) SpO2 BMI OB Status Smoking Status 94% 35.4 kg/m2 Postmenopausal Former Smoker Vitals History BMI and BSA Data Body Mass Index Body Surface Area  
 35.4 kg/m 2 2.1 m 2 Preferred Pharmacy Pharmacy Name Phone [facility] [facility] [phone] Your Updated Medication List  
  
   
This list is accurate as of 6/6/18  9:19 AM.  Always use your most recent med list.  
  
  
  
  
 aspirin delayed-release 81 mg tablet Take  by mouth daily. atorvastatin 40 mg tablet Commonly known as:  LIPITOR Take  by mouth daily. azelastine 0.05 % ophthalmic solution Commonly known as:  OPTIVAR  
  
 famotidine 20 mg tablet Commonly known as:  PEPCID  
  
 furosemide 40 mg tablet Commonly known as:  LASIX Take 20 mg by mouth daily. insulin glargine 100 unit/mL injection Commonly known as:  LANTUS U-100 INSULIN  
60 units in AM  
  
 insulin lispro 100 unit/mL kwikpen Commonly known as:  HumaLOG KwikPen Insulin 20  -25  units before each meal along with sliding scale Max daily units 150 * insulin syringe-needle U-100 1 mL 31 gauge x 15/64\" Syrg 1 Syringe by Does Not Apply route two (2) times a day. Dx code: E11.65  
  
 * ULTICARE 1 mL 31 gauge x 5/16 Syrg Generic drug:  Insulin Syringe-Needle U-100  
  
 levETIRAcetam 500 mg tablet Commonly known as:  KEPPRA Take  by mouth two (2) times a day. lisinopril 5 mg tablet Commonly known as:  Marian Dowdy Take 5 mg by mouth daily. loratadine 10 mg tablet Commonly known as:  Beatriz Badder Take 10 mg by mouth daily. meloxicam 15 mg tablet Commonly known as:  MOBIC Take 15 mg by mouth daily. metFORMIN 1,000 mg tablet Commonly known as:  GLUCOPHAGE Take 1 Tab by mouth two (2) times daily (with meals). nabumetone 500 mg tablet Commonly known as:  RELAFEN Take 500 mg by mouth two (2) times a day. Janelle Pen Needle 32 gauge x 5/32\" Ndle Generic drug:  Insulin Needles (Disposable) USE 4 TIMES A DAY, AS INSTRUCTED. oxybutynin chloride XL 10 mg CR tablet Commonly known as:  DITROPAN XL Take 10 mg by mouth daily. pioglitazone 30 mg tablet Commonly known as:  ACTOS  
TAKE ONE (1) TABLET, BY MOUTH, DAILY. * potassium chloride 20 mEq/15 mL solution Commonly known as:  KAON 10% Take  by mouth daily. * potassium chloride SR 10 mEq tablet Commonly known as:  KLOR-CON 10 Take  by mouth. RESTASIS 0.05 % ophthalmic emulsion Generic drug:  cycloSPORINE  
  
 trospium 20 mg tablet Commonly known as:  Rosas Hatillo Take 40 mg by mouth daily. TRUE METRIX GLUCOSE TEST STRIP strip Generic drug:  glucose blood VI test strips  
by Does Not Apply route See Admin Instructions. Use to check blood glucose 4x daily. Dx code: E11.65 VITAMIN D2 50,000 unit capsule Generic drug:  ergocalciferol Take 50,000 Units by mouth. Every wed and sat * Notice: This list has 4 medication(s) that are the same as other medications prescribed for you. Read the directions carefully, and ask your doctor or other care provider to review them with you. Follow-up Instructions Return in about 4 months (around 10/6/2018). Introducing [facility]! [employer] introduces [employer] patient portal. Now you can access parts of your medical record, email your doctor's office, and request medication refills online. 1. In your internet browser, go to [email or web address] 2. Click on the First Time User? Click Here link in the Sign In box. You will see the New Member Sign Up page. 3. Enter your [employer] Access Code exactly as it appears below. You will not need to use this code after youve completed the sign-up process. If you do not sign up before the expiration date, you must request a new code. · [employer] Access Code: [email or web address] Expires: 9/4/2018  9:19 AM 
 
4. Enter the last four digits of your Social Security Number (xxxx) and Date of Birth (mm/dd/yyyy) as indicated and click Submit. You will be taken to the next sign-up page. 5. Create a [employer] ID. This will be your [employer] login ID and cannot be changed, so think of one that is secure and easy to remember. 6. Create a [employer] password. You can change your password at any time. 7. Enter your Password Reset Question and Answer. This can be used at a later time if you forget your password. 8. Enter your e-mail address. You will receive e-mail notification when new information is available in [address] 9. Click Sign Up. You can now view and download portions of your medical record. 10. Click the Download Summary menu link to download a portable copy of your medical information. If you have questions, please visit the Frequently Asked Questions section of the [employer] website. Remember, [employer] is NOT to be used for urgent needs. For medical emergencies, dial 911. Now available from your iPhone and Android! Please provide this summary of care documentation to your next provider. Your primary care clinician is listed as [employer]. If you have any questions after today's visit, please call [phone].

## 2018-06-06 NOTE — PROGRESS NOTES
[facility]               Muriel Coon MD        [address] [phone] Fax [phone]               Patient Information  Date:6/6/2018  Name : Tonya Ruth 78 y.o.     YOB: 1950         Referred by: Cherelle Chin MD         Chief Complaint   Patient presents with    Diabetes       History of Present Illness: Tonya Ruth is a 78 y.o. female here for fu of  Type 2 Diabetes Mellitus. Referred by Cherelle Chin MD for evaluation and management of uncontrolled diabetes mellitus. She was diagnosed with diabetes in 2008. She is checking  4 times daily, blood glucose seems to be very good except for few which are higher   she has maintained a very good log    Reviewed log - looks very good, but A1C is high   no hypos   Diet healthy     No chest pain, shortness of breath      Wt Readings from Last 3 Encounters:   06/06/18 212 lb 11.2 oz (96.5 kg)   02/20/18 212 lb 9.6 oz (96.4 kg)   10/02/17 209 lb (94.8 kg)       BP Readings from Last 3 Encounters:   06/06/18 137/62   02/20/18 117/59   10/02/17 97/70           Past Medical History:   Diagnosis Date    Arthritis     [facility]     Diabetes ([facility]     Hyperlipidemia     Hypertension     Seizure ([facility]      Current Outpatient Prescriptions   Medication Sig    oxybutynin chloride XL (DITROPAN XL) 10 mg CR tablet Take 10 mg by mouth daily.  meloxicam (MOBIC) 15 mg tablet Take 15 mg by mouth daily.  potassium chloride SR (KLOR-CON 10) 10 mEq tablet Take  by mouth.  insulin glargine (LANTUS U-100 INSULIN) 100 unit/mL injection 60 units in AM    insulin lispro (HUMALOG KWIKPEN INSULIN) 100 unit/mL kwikpen 20  -25  units before each meal along with sliding scale Max daily units 150    pioglitazone (ACTOS) 30 mg tablet TAKE ONE (1) TABLET, BY MOUTH, DAILY.  FAITH PEN NEEDLE 32 gauge x 5/32\" ndle USE 4 TIMES A DAY, AS INSTRUCTED.     famotidine (PEPCID) 20 mg tablet     metFORMIN (GLUCOPHAGE) 1,000 mg tablet Take 1 Tab by mouth two (2) times daily (with meals).  glucose blood VI test strips (TRUE METRIX GLUCOSE TEST STRIP) strip by Does Not Apply route See Admin Instructions. Use to check blood glucose 4x daily. Dx code: E11.65    insulin syringe-needle U-100 1 mL 31 gauge x 15/64\" syrg 1 Syringe by Does Not Apply route two (2) times a day. Dx code: E11.65    furosemide (LASIX) 40 mg tablet Take 20 mg by mouth daily.  lisinopril (PRINIVIL, ZESTRIL) 5 mg tablet Take 5 mg by mouth daily.  loratadine (CLARITIN) 10 mg tablet Take 10 mg by mouth daily.  levETIRAcetam (KEPPRA) 500 mg tablet Take  by mouth two (2) times a day.  aspirin delayed-release 81 mg tablet Take  by mouth daily.  azelastine (OPTIVAR) 0.05 % ophthalmic solution     RESTASIS 0.05 % ophthalmic emulsion     ULTICARE 1 mL 31 gauge x 5/16 syrg     trospium (SANCTURA) 20 mg tablet Take 40 mg by mouth daily.  VITAMIN D2 50,000 unit capsule Take 50,000 Units by mouth. Every wed and sat    atorvastatin (LIPITOR) 40 mg tablet Take  by mouth daily.  nabumetone (RELAFEN) 500 mg tablet Take 500 mg by mouth two (2) times a day.  potassium chloride (KAON 10%) 20 mEq/15 mL solution Take  by mouth daily. No current facility-administered medications for this visit. Allergies   Allergen Reactions    Oxycodone-Acetaminophen Unknown (comments)    Vicodin [Hydrocodone-Acetaminophen] Unknown (comments)         Review of Systems:  -   - Gastrointestinal: no dysphagia no  abdominal pain  - Musculoskeletal: +  joint pains +   weakness  - Integumentary: no rashes  - Neurological: +  numbness, tingling, no  headaches  - Psychiatric: no depression no  anxiety  - Endocrine: no heat or cold intolerance    Physical Examination:   Blood pressure 137/62, pulse 64, temperature 96.7 °F (35.9 °C), temperature source Oral, resp.  rate 14, height 5' 5\" (1.651 m), weight 212 lb 11.2 oz (96.5 kg), SpO2 94 %. Estimated body mass index is 35.4 kg/(m^2) as calculated from the following:    Height as of this encounter: 5' 5\" (1.651 m). -   Weight as of this encounter: 212 lb 11.2 oz (96.5 kg). - General: pleasant, no distress, good eye contact  - HEENT: no pallor, no periorbital edema, EOMI  - Neck: supple, no thyromegaly, no nodules  - Cardiovascular: regular, normal rate, normal S1 and S2  - Respiratory: clear to auscultation bilaterally  - Musculoskeletal: no edema,   - Neurological:alert and oriented  - Psychiatric: normal mood and affect  - Skin: color, texture, turgor normal.   Diabetic foot exam: February 2018    Left:     Vibratory sensation absent   Filament test normal sensation with micro filament   Pulse DP: 1+    Deformities: None  Right:    Vibratory sensation absent   Filament test normal sensation with micro filament   Pulse DP: 1+   Deformities: None        Data Reviewed:     [] Glucose records reviewed. [] See glucose records for details (to be scanned). [] A1C  [] Reviewed labs      Assessment/Plan:     No diagnosis found. 1. Type 2 Diabetes Mellitus       Lab Results   Component Value Date/Time    Hemoglobin A1c 7.6 (H) 03/15/2017 09:57 AM    Hemoglobin A1c (POC) 6.9 10/02/2017 11:13 AM    Hemoglobin A1c, External 7.6 02/12/2018   A1c is 8 which was done at PCPs office    Home glucose monitoring seems to be very good, A1c is higher than what is expected. No known hemoglobinopathy  No changes need to the insulin  Discussed pathophysiology of type 2 diabetes, nutritional classes discussed. Lantus 60 units once daily, Apidra 25 units before each meal.   Stressed the importance of eating a healthy diet. DM classes  Actos  Metformin 1000 mg BID   ASA    There are no Patient Instructions on file for this visit. FLU annually ,Pneumovax ,aspirin daily,annual eye exam,microalbumin    2. HTN : Continue current therapy ,     3. Hyperlipidemia : Continue statin.     4.Obesity:Body mass index is 35.4 kg/(m^2). Discussed about the importance of exercise and carbohydrate portion control. There are no Patient Instructions on file for this visit. Follow-up Disposition: Not on File    Thank you for allowing me to participate in the care of this patient.     Camille Gómez MD      Patient verbalized understanding

## 2018-06-12 ENCOUNTER — OP HISTORICAL/CONVERTED ENCOUNTER (OUTPATIENT)
Dept: OTHER | Age: 68
End: 2018-06-12

## 2018-06-13 ENCOUNTER — TELEPHONE (OUTPATIENT)
Dept: ENDOCRINOLOGY | Age: 68
End: 2018-06-13

## 2018-06-13 NOTE — TELEPHONE ENCOUNTER
Blood sugar  Monday  122 am  102 noon  89 pm  163 bedtime    Tuesday   106 am  65 noon  89 pm  77 bedtime    Wednesday  69 am  69 noon    Metformin 500mg bid  lantus 60 units every morning   humalog 20 units TIDAC  actos 30 mg QD

## 2018-06-18 ENCOUNTER — TELEPHONE (OUTPATIENT)
Dept: ENDOCRINOLOGY | Age: 68
End: 2018-06-18

## 2018-06-18 NOTE — TELEPHONE ENCOUNTER
Patient called in stating her blood sugar is low today it was 85 then 67(at 12:04 today) patient then ate,had some Peanut Butter and juice. Had patient recheck blood sugar at 12:28 its 92  Patient states that for the past few days it has been this way. She takes 60units LANTUS in the morning and has concerns about medication dosage if there needs to be changes since her blood sugar is dropping so low. Please advise.

## 2018-06-18 NOTE — TELEPHONE ENCOUNTER
lantus 45 units in AM    Hold ifrah     Called and talked to daughter who is taking care of the pt     To call back in 2 days

## 2018-06-19 DIAGNOSIS — E11.65 TYPE 2 DIABETES MELLITUS WITH HYPERGLYCEMIA, WITH LONG-TERM CURRENT USE OF INSULIN (HCC): ICD-10-CM

## 2018-06-19 DIAGNOSIS — Z79.4 TYPE 2 DIABETES MELLITUS WITH HYPERGLYCEMIA, WITH LONG-TERM CURRENT USE OF INSULIN (HCC): ICD-10-CM

## 2018-06-19 DIAGNOSIS — Z79.4 UNCONTROLLED TYPE 2 DIABETES MELLITUS WITH HYPERGLYCEMIA, WITH LONG-TERM CURRENT USE OF INSULIN (HCC): ICD-10-CM

## 2018-06-19 DIAGNOSIS — E11.65 UNCONTROLLED TYPE 2 DIABETES MELLITUS WITH HYPERGLYCEMIA, WITH LONG-TERM CURRENT USE OF INSULIN (HCC): ICD-10-CM

## 2018-06-19 RX ORDER — METFORMIN HYDROCHLORIDE 1000 MG/1
1000 TABLET ORAL 2 TIMES DAILY WITH MEALS
Qty: 180 TAB | Refills: 3 | Status: SHIPPED | OUTPATIENT
Start: 2018-06-19 | End: 2019-03-13 | Stop reason: SDUPTHER

## 2018-06-19 NOTE — TELEPHONE ENCOUNTER
----- Message from Dago Villa sent at 6/19/2018 11:51 AM EDT -----  Regarding: Dr. Anuel Calvo  Pt requested a refill on her Rx(Metformin) called to [facility] [phone]. Pt stated there are no refills left. Best contact number [phone].

## 2018-06-19 NOTE — TELEPHONE ENCOUNTER
Informed patient prescription will be sent to pharmacy after 5 or 6 pm. She verbalized understanding.

## 2018-06-21 ENCOUNTER — TELEPHONE (OUTPATIENT)
Dept: ENDOCRINOLOGY | Age: 68
End: 2018-06-21

## 2018-06-21 NOTE — TELEPHONE ENCOUNTER
----- Message from Rachel Salinas sent at 6/21/2018  2:02 PM EDT -----  Regarding: Dr. Soto Meals  Pt requesting a call back in regards to sugar levels being low. Pt advised that this morning testing was 107 and during lunch 75. Pt best contact number is [phone].

## 2018-06-21 NOTE — TELEPHONE ENCOUNTER
Spoke with patient's daughter with patient able to hear the phone call. She stated blood sugar this morning was 107 and before lunch is 75. Stated blood sugar has not been consistently at 110 or below. Patient is also having to eat more and eat more carbs to keep blood sugar at that level. She is taking medication as prescribed. Please advise.

## 2018-06-27 ENCOUNTER — TELEPHONE (OUTPATIENT)
Dept: ENDOCRINOLOGY | Age: 68
End: 2018-06-27

## 2018-06-27 NOTE — TELEPHONE ENCOUNTER
Spoke with patient and informed her to begin taking Lantus (long acting insulin) 10 units in the morning only. She got the medication and spelled it out and verbalized understanding. She knows not to take Humalog at this time.

## 2018-06-27 NOTE — TELEPHONE ENCOUNTER
Patient called and stated her blood sugar is elevated. The blood sugars are as follows:  06/24    bedtime 135  06/25    bedtime 158  06/26    bedtime 140  06/27     Patient is taking metformin and actos as prescribed. Asked patient about diet and she stated she is trying to watch what she is eating. Please advise.

## 2018-07-03 DIAGNOSIS — E11.65 TYPE 2 DIABETES MELLITUS WITH HYPERGLYCEMIA, UNSPECIFIED WHETHER LONG TERM INSULIN USE (HCC): Primary | ICD-10-CM

## 2018-07-03 RX ORDER — INSULIN GLARGINE 100 [IU]/ML
INJECTION, SOLUTION SUBCUTANEOUS
Qty: 15 ML | Refills: 6
Start: 2018-07-03 | End: 2018-07-09 | Stop reason: DRUGHIGH

## 2018-07-03 NOTE — TELEPHONE ENCOUNTER
Patient called and stated her blood sugars are elevated. Blood sugars are as follows:  07/01     07/02     07/03   She stated she thinks it has something to do with the hernia because she is in pain. She has appointment with a physician on 07/16 for hernia. Explained to patient blood sugars are not terrible. Verified with her she is taking medication as prescribed and she verbalized she is. Asked patient about diet. She is eating a lot of apple sauce. Encouraged patient to keep a food diary for several days and call back with update. Informed her not to eat carbs such as bread, pasta, rice, and potatoes. Explained an apple would be better than applesauce. Discussed other dietary options such as vegetables, chicken, fish, and salad. She verbalized understanding.

## 2018-09-04 ENCOUNTER — TELEPHONE (OUTPATIENT)
Dept: ENDOCRINOLOGY | Age: 68
End: 2018-09-04

## 2018-09-04 NOTE — TELEPHONE ENCOUNTER
Have you recd a request for diabetic supplies and when will it be completed? Please call [phone].  Thank you

## 2018-09-05 NOTE — TELEPHONE ENCOUNTER
Informed pt form was filled out and faxed back today. Pt verbalized understanding with no further questions or concerns at this time.

## 2018-11-05 ENCOUNTER — OFFICE VISIT (OUTPATIENT)
Dept: ENDOCRINOLOGY | Age: 68
End: 2018-11-05

## 2018-11-05 VITALS
TEMPERATURE: 97.6 F | SYSTOLIC BLOOD PRESSURE: 151 MMHG | HEART RATE: 55 BPM | WEIGHT: 200.8 LBS | HEIGHT: 65 IN | RESPIRATION RATE: 16 BRPM | DIASTOLIC BLOOD PRESSURE: 71 MMHG | BODY MASS INDEX: 33.45 KG/M2 | OXYGEN SATURATION: 97 %

## 2018-11-05 DIAGNOSIS — E11.65 TYPE 2 DIABETES MELLITUS WITH HYPERGLYCEMIA, WITH LONG-TERM CURRENT USE OF INSULIN (HCC): Primary | ICD-10-CM

## 2018-11-05 DIAGNOSIS — E11.65 TYPE 2 DIABETES MELLITUS WITH HYPERGLYCEMIA, WITH LONG-TERM CURRENT USE OF INSULIN (HCC): ICD-10-CM

## 2018-11-05 DIAGNOSIS — Z79.4 TYPE 2 DIABETES MELLITUS WITH HYPERGLYCEMIA, WITH LONG-TERM CURRENT USE OF INSULIN (HCC): Primary | ICD-10-CM

## 2018-11-05 DIAGNOSIS — Z79.4 TYPE 2 DIABETES MELLITUS WITH HYPERGLYCEMIA, WITH LONG-TERM CURRENT USE OF INSULIN (HCC): ICD-10-CM

## 2018-11-05 DIAGNOSIS — E78.2 MIXED HYPERLIPIDEMIA: ICD-10-CM

## 2018-11-05 DIAGNOSIS — I10 ESSENTIAL HYPERTENSION: ICD-10-CM

## 2018-11-05 LAB — HBA1C MFR BLD HPLC: 8 %

## 2018-11-05 RX ORDER — INSULIN GLARGINE 100 [IU]/ML
INJECTION, SOLUTION SUBCUTANEOUS
Qty: 15 ML | Refills: 11 | Status: SHIPPED | OUTPATIENT
Start: 2018-11-05 | End: 2019-07-24 | Stop reason: SDUPTHER

## 2018-11-05 NOTE — PROGRESS NOTES
[facility]               Mimi Roy MD        [address] [address] CF:[phone] Fax [phone]               Patient Information  Date:11/5/2018  Name : Bentley Bueno 80 y.o.     YOB: 1950         Referred by: Bartolome Alexander MD         Chief Complaint   Patient presents with    Diabetes       History of Present Illness: Bentley Bueno is a 80 y.o. female here for fu of  Type 2 Diabetes Mellitus. Referred by Bartolome Alexander MD for evaluation and management of uncontrolled diabetes mellitus. She was diagnosed with diabetes in 2008. She has maintained a very good log, checking blood glucose 4 times daily  After the last visit she had called about hypoglycemia, gradually decreased the dose of insulin. She is on 16 units of basal insulin, of prandial insulin. At one point she was on 60 units of basal insulin, 25 units before each meal of prandial insulin  Had UTI and was on antibiotics. Now the blood glucose has been gradually increasing, reportedly no change in the diet      No chest pain, shortness of breath      Wt Readings from Last 3 Encounters:   11/05/18 200 lb 12.8 oz (91.1 kg)   06/06/18 212 lb 11.2 oz (96.5 kg)   02/20/18 212 lb 9.6 oz (96.4 kg)       BP Readings from Last 3 Encounters:   11/05/18 151/71   06/06/18 137/62   02/20/18 117/59           Past Medical History:   Diagnosis Date    Arthritis     [facility]     Diabetes ([facility]     Hyperlipidemia     Hypertension     Seizure ([facility]      Current Outpatient Medications   Medication Sig    insulin glargine (LANTUS SOLOSTAR U-100 INSULIN) 100 unit/mL (3 mL) inpn Inject 16 units in the morning.  metFORMIN (GLUCOPHAGE) 1,000 mg tablet Take 1 Tab by mouth two (2) times daily (with meals).  oxybutynin chloride XL (DITROPAN XL) 10 mg CR tablet Take 10 mg by mouth daily.  meloxicam (MOBIC) 15 mg tablet Take 15 mg by mouth daily.     pioglitazone (ACTOS) 30 mg tablet TAKE ONE (1) TABLET, BY MOUTH, DAILY.  FAITH PEN NEEDLE 32 gauge x 5/32\" ndle USE 4 TIMES A DAY, AS INSTRUCTED.  azelastine (OPTIVAR) 0.05 % ophthalmic solution     RESTASIS 0.05 % ophthalmic emulsion     glucose blood VI test strips (TRUE METRIX GLUCOSE TEST STRIP) strip by Does Not Apply route See Admin Instructions. Use to check blood glucose 4x daily. Dx code: E11.65    insulin syringe-needle U-100 1 mL 31 gauge x 15/64\" syrg 1 Syringe by Does Not Apply route two (2) times a day. Dx code: E11.65    furosemide (LASIX) 40 mg tablet Take 20 mg by mouth daily.  atorvastatin (LIPITOR) 40 mg tablet Take  by mouth daily.  lisinopril (PRINIVIL, ZESTRIL) 5 mg tablet Take 5 mg by mouth daily.  levETIRAcetam (KEPPRA) 500 mg tablet Take  by mouth two (2) times a day.  aspirin delayed-release 81 mg tablet Take  by mouth daily.  potassium chloride (KAON 10%) 20 mEq/15 mL solution Take  by mouth daily. No current facility-administered medications for this visit. Allergies   Allergen Reactions    Oxycodone-Acetaminophen Unknown (comments)    Vicodin [Hydrocodone-Acetaminophen] Unknown (comments)         Review of Systems:  -   - Gastrointestinal: no dysphagia no  abdominal pain  - Musculoskeletal: +  joint pains +   weakness  - Integumentary: no rashes  - Neurological: +  numbness, tingling, no  headaches  - Psychiatric: no depression no  anxiety  - Endocrine: no heat or cold intolerance    Physical Examination:   Blood pressure 151/71, pulse (!) 55, temperature 97.6 °F (36.4 °C), temperature source Oral, resp. rate 16, height 5' 5\" (1.651 m), weight 200 lb 12.8 oz (91.1 kg), SpO2 97 %. Estimated body mass index is 33.41 kg/m² as calculated from the following:    Height as of this encounter: 5' 5\" (1.651 m). -   Weight as of this encounter: 200 lb 12.8 oz (91.1 kg).   - General: pleasant, no distress, good eye contact  - HEENT: no pallor, no periorbital edema, EOMI  - Neck: supple, no thyromegaly, no nodules  - Cardiovascular: regular, normal rate, normal S1 and S2  - Respiratory: clear to auscultation bilaterally  - Musculoskeletal: no edema,   - Neurological:alert and oriented  - Psychiatric: normal mood and affect  - Skin: color, texture, turgor normal.   Diabetic foot exam: February 2018    Left:     Vibratory sensation absent   Filament test normal sensation with micro filament   Pulse DP: 1+    Deformities: None  Right:    Vibratory sensation absent   Filament test normal sensation with micro filament   Pulse DP: 1+   Deformities: None        Data Reviewed:     [] Glucose records reviewed. [] See glucose records for details (to be scanned). [] A1C  [] Reviewed labs      Assessment/Plan:     1. Type 2 diabetes mellitus with hyperglycemia, with long-term current use of insulin (HCC)        1. Type 2 Diabetes Mellitus       Lab Results   Component Value Date/Time    Hemoglobin A1c 7.6 (H) 03/15/2017 09:57 AM    Hemoglobin A1c (POC) 6.9 10/02/2017 11:13 AM    Hemoglobin A1c, External 8.0 06/02/2018   Adjusted insulin   Lantus 24 units once daily,   Stressed the importance of eating a healthy diet. DM classes  Actos  Metformin 1000 mg BID   ASA    There are no Patient Instructions on file for this visit. FLU annually ,Pneumovax ,aspirin daily,annual eye exam,microalbumin    2. HTN : Continue current therapy ,     3. Hyperlipidemia : Continue statin. 4.Obesity:Body mass index is 33.41 kg/m². Discussed about the importance of exercise and carbohydrate portion control. There are no Patient Instructions on file for this visit. Follow-up Disposition: Not on File    Thank you for allowing me to participate in the care of this patient.     Vinicius Ram MD      Patient verbalized understanding

## 2018-11-05 NOTE — PROGRESS NOTES
1. Have you been to the ER, urgent care clinic since your last visit? Hospitalized since your last visit? No    2. Have you seen or consulted any other health care providers outside of the [address] [place] [address] since your last visit? Include any pap smears or colon screening.  No     Wt Readings from Last 3 Encounters:   11/05/18 200 lb 12.8 oz (91.1 kg)   06/06/18 212 lb 11.2 oz (96.5 kg)   02/20/18 212 lb 9.6 oz (96.4 kg)     Temp Readings from Last 3 Encounters:   11/05/18 97.6 °F (36.4 °C) (Oral)   06/06/18 96.7 °F (35.9 °C) (Oral)   02/20/18 96.9 °F (36.1 °C) (Oral)     BP Readings from Last 3 Encounters:   11/05/18 151/71   06/06/18 137/62   02/20/18 117/59     Pulse Readings from Last 3 Encounters:   11/05/18 (!) 55   06/06/18 64   02/20/18 78

## 2018-11-06 ENCOUNTER — TELEPHONE (OUTPATIENT)
Dept: ENDOCRINOLOGY | Age: 68
End: 2018-11-06

## 2018-11-06 NOTE — TELEPHONE ENCOUNTER
Informed pt that Dr Amalia Ramos wants her to take 24 units of Lantus daily in the AM. Pt verbalized understanding

## 2018-11-06 NOTE — TELEPHONE ENCOUNTER
Pt is calling to speak with the nurse regarding questions for medication. She stated she cant remember which medication she needs to take the 24 units. [phone].

## 2019-01-08 DIAGNOSIS — Z79.4 TYPE 2 DIABETES MELLITUS WITH HYPERGLYCEMIA, WITH LONG-TERM CURRENT USE OF INSULIN (HCC): ICD-10-CM

## 2019-01-08 DIAGNOSIS — Z79.4 UNCONTROLLED TYPE 2 DIABETES MELLITUS WITH HYPERGLYCEMIA, WITH LONG-TERM CURRENT USE OF INSULIN (HCC): ICD-10-CM

## 2019-01-08 DIAGNOSIS — E11.65 TYPE 2 DIABETES MELLITUS WITH HYPERGLYCEMIA, WITH LONG-TERM CURRENT USE OF INSULIN (HCC): ICD-10-CM

## 2019-01-08 DIAGNOSIS — E11.65 UNCONTROLLED TYPE 2 DIABETES MELLITUS WITH HYPERGLYCEMIA, WITH LONG-TERM CURRENT USE OF INSULIN (HCC): ICD-10-CM

## 2019-01-08 RX ORDER — PEN NEEDLE, DIABETIC 32GX 5/32"
NEEDLE, DISPOSABLE MISCELLANEOUS
Qty: 200 PEN NEEDLE | Refills: 2 | Status: SHIPPED | OUTPATIENT
Start: 2019-01-08 | End: 2021-08-20 | Stop reason: ALTCHOICE

## 2019-01-31 DIAGNOSIS — Z79.4 UNCONTROLLED TYPE 2 DIABETES MELLITUS WITH HYPERGLYCEMIA, WITH LONG-TERM CURRENT USE OF INSULIN (HCC): ICD-10-CM

## 2019-01-31 DIAGNOSIS — E11.65 UNCONTROLLED TYPE 2 DIABETES MELLITUS WITH HYPERGLYCEMIA, WITH LONG-TERM CURRENT USE OF INSULIN (HCC): ICD-10-CM

## 2019-02-01 RX ORDER — PIOGLITAZONEHYDROCHLORIDE 30 MG/1
TABLET ORAL
Qty: 90 TAB | Refills: 3 | Status: SHIPPED | OUTPATIENT
Start: 2019-02-01 | End: 2020-01-28

## 2019-02-01 NOTE — TELEPHONE ENCOUNTER
Called pt to inquire about requested quantity. Pt stated she wanted 60 so she wouldn't have to go to the pharmacy as much. Informed her we can send 90 days. . Pt agreed. No further questions or concerns at this time.

## 2019-02-01 NOTE — TELEPHONE ENCOUNTER
----- Message from Rajni Houston sent at 2/1/2019  8:20 AM EST -----  Regarding: Dr. Cata Peterson  Pt is calling for refill sent to [facility] for her \"pioglitazone\" Pt's contact is [phone]. She is requesting 60 tablets instead 30.

## 2019-02-28 LAB
CREATININE, EXTERNAL: 0.73
MICROALBUMIN UR TEST STR-MCNC: <3 MG/DL

## 2019-03-08 ENCOUNTER — OP HISTORICAL/CONVERTED ENCOUNTER (OUTPATIENT)
Dept: OTHER | Age: 69
End: 2019-03-08

## 2019-03-13 ENCOUNTER — OFFICE VISIT (OUTPATIENT)
Dept: ENDOCRINOLOGY | Age: 69
End: 2019-03-13

## 2019-03-13 VITALS
BODY MASS INDEX: 34.32 KG/M2 | SYSTOLIC BLOOD PRESSURE: 114 MMHG | TEMPERATURE: 96.2 F | HEART RATE: 70 BPM | HEIGHT: 65 IN | OXYGEN SATURATION: 98 % | DIASTOLIC BLOOD PRESSURE: 48 MMHG | WEIGHT: 206 LBS | RESPIRATION RATE: 14 BRPM

## 2019-03-13 DIAGNOSIS — E11.65 TYPE 2 DIABETES MELLITUS WITH HYPERGLYCEMIA, WITH LONG-TERM CURRENT USE OF INSULIN (HCC): Primary | ICD-10-CM

## 2019-03-13 DIAGNOSIS — E78.2 MIXED HYPERLIPIDEMIA: ICD-10-CM

## 2019-03-13 DIAGNOSIS — I10 ESSENTIAL HYPERTENSION WITH GOAL BLOOD PRESSURE LESS THAN 140/90: ICD-10-CM

## 2019-03-13 DIAGNOSIS — Z79.4 TYPE 2 DIABETES MELLITUS WITH HYPERGLYCEMIA, WITH LONG-TERM CURRENT USE OF INSULIN (HCC): Primary | ICD-10-CM

## 2019-03-13 RX ORDER — METFORMIN HYDROCHLORIDE 750 MG/1
750 TABLET, EXTENDED RELEASE ORAL 2 TIMES DAILY
Qty: 180 TAB | Refills: 3 | Status: SHIPPED | OUTPATIENT
Start: 2019-03-13 | End: 2019-07-24

## 2019-03-13 NOTE — PROGRESS NOTES
[facility]               Lynne Rasheed MD        [address] [phone] Fax [phone]               Patient Information  Date:3/14/2019  Name : Obed Ferrara 80 y.o.     YOB: 1950         Referred by: Cesilia Oakley MD         Chief Complaint   Patient presents with    Diabetes    Diabetic Foot Exam       History of Present Illness: Obed Ferrara is a 80 y.o. female here for fu of  Type 2 Diabetes Mellitus. Referred by Cesilia Oakley MD for evaluation and management of uncontrolled diabetes mellitus. She was diagnosed with diabetes in 2008. She has maintained a very good log, checking blood glucose 4 times daily  No further episodes of hypoglycemia  She is off prandial insulin  Occasionally diet  is off      November 2018 history  After the last visit she had called about hypoglycemia, gradually decreased the dose of insulin. She is on 16 units of basal insulin, off prandial insulin. At one point she was on 60 units of basal insulin, 25 units before each meal of prandial insulin  Had UTI and was on antibiotics. Now the blood glucose has been gradually increasing, reportedly no change in the diet      No chest pain, shortness of breath      Wt Readings from Last 3 Encounters:   03/13/19 206 lb (93.4 kg)   11/05/18 200 lb 12.8 oz (91.1 kg)   06/06/18 212 lb 11.2 oz (96.5 kg)       BP Readings from Last 3 Encounters:   03/13/19 114/48   11/05/18 151/71   06/06/18 137/62           Past Medical History:   Diagnosis Date    Arthritis     [facility]     Diabetes [facility]     Hyperlipidemia     Hypertension     Seizure ([facility]      Current Outpatient Medications   Medication Sig    pioglitazone (ACTOS) 30 mg tablet TAKE ONE (1) TABLET, BY MOUTH, DAILY.  FAITH PEN NEEDLE 32 gauge x 5/32\" ndle USE 4 TIMES A DAY, AS INSTRUCTED.     insulin glargine (LANTUS SOLOSTAR U-100 INSULIN) 100 unit/mL (3 mL) inpn Inject 24 units in the morning.  oxybutynin chloride XL (DITROPAN XL) 10 mg CR tablet Take 10 mg by mouth daily.  meloxicam (MOBIC) 15 mg tablet Take 15 mg by mouth daily.  azelastine (OPTIVAR) 0.05 % ophthalmic solution     glucose blood VI test strips (TRUE METRIX GLUCOSE TEST STRIP) strip by Does Not Apply route See Admin Instructions. Use to check blood glucose 4x daily. Dx code: E11.65    insulin syringe-needle U-100 1 mL 31 gauge x 15/64\" syrg 1 Syringe by Does Not Apply route two (2) times a day. Dx code: E11.65    furosemide (LASIX) 40 mg tablet Take 20 mg by mouth daily.  atorvastatin (LIPITOR) 40 mg tablet Take 40 mg by mouth nightly.  lisinopril (PRINIVIL, ZESTRIL) 5 mg tablet Take 5 mg by mouth daily.  levETIRAcetam (KEPPRA) 500 mg tablet Take  by mouth two (2) times a day.  aspirin delayed-release 81 mg tablet Take  by mouth daily.  potassium chloride (KAON 10%) 20 mEq/15 mL solution Take  by mouth daily. PILL    metFORMIN ER (GLUCOPHAGE XR) 750 mg tablet Take 1 Tab by mouth two (2) times a day.  RESTASIS 0.05 % ophthalmic emulsion      No current facility-administered medications for this visit. Allergies   Allergen Reactions    Oxycodone-Acetaminophen Unknown (comments)    Vicodin [Hydrocodone-Acetaminophen] Unknown (comments)         Review of Systems:  -   - Gastrointestinal: no dysphagia no  abdominal pain  - Musculoskeletal: +  joint pains +   weakness  - Integumentary: no rashes  - Neurological: +  numbness, tingling, no  headaches  - Psychiatric: no depression no  anxiety  - Endocrine: no heat or cold intolerance    Physical Examination:   Blood pressure 114/48, pulse 70, temperature 96.2 °F (35.7 °C), temperature source Oral, resp. rate 14, height 5' 5\" (1.651 m), weight 206 lb (93.4 kg), SpO2 98 %. Estimated body mass index is 34.28 kg/m² as calculated from the following:    Height as of this encounter: 5' 5\" (1.651 m).   -   Weight as of this encounter: 206 lb (93.4 kg). - General: pleasant, no distress, good eye contact  - HEENT: no pallor, no periorbital edema, EOMI  - Neck: supple, no thyromegaly, no nodules  - Cardiovascular: regular, normal rate, normal S1 and S2  - Respiratory: clear to auscultation bilaterally  - Musculoskeletal: no edema,   - Neurological:alert and oriented  - Psychiatric: normal mood and affect  - Skin: color, texture, turgor normal.   Diabetic foot exam: Mar 2019     Left:     Vibratory sensation absent   Filament test normal sensation with micro filament   Pulse DP: 1+    Deformities: None  Right:    Vibratory sensation absent   Filament test normal sensation with micro filament   Pulse DP: 1+   Deformities: None              Assessment/Plan:     1. Type 2 diabetes mellitus with hyperglycemia, with long-term current use of insulin ([address]    2. Essential hypertension with goal blood pressure less than 140/90    3. Mixed hyperlipidemia        1. Type 2 Diabetes Mellitus       Lab Results   Component Value Date/Time    Hemoglobin A1c 8.3 (H) 03/13/2019 11:04 AM    Hemoglobin A1c (POC) 8 11/05/2018 11:20 AM    Hemoglobin A1c, External 8.0 06/02/2018   Stable, A1c higher than what I would have expected based on the glucose log   Lantus 24 units once daily,   Stressed the importance of eating a healthy diet. Actos  Metformin 1000 mg AM   ASA    Patient Instructions   lantus 24 units in AM     FLU annually ,Pneumovax ,aspirin daily,annual eye exam,microalbumin    2. HTN : Continue current therapy ,     3. Hyperlipidemia : Continue statin. 4.Obesity:Body mass index is 34.28 kg/m². Discussed about the importance of exercise and carbohydrate portion control. Patient Instructions   lantus 24 units in AM     Follow-up Disposition:  Return in about 4 months (around 7/13/2019) for labs bnv and follow up. Thank you for allowing me to participate in the care of this patient.     Shabana King MD      Patient verbalized understanding

## 2019-03-13 NOTE — PROGRESS NOTES
Florencia Graham is a 80 y.o. female here for   Chief Complaint   Patient presents with    Diabetes    Diabetic Foot Exam       Functional glucose monitor and record keeping system? -yes   Eye exam within last year? - on file  Foot exam within last year? - due    1. Have you been to the ER, urgent care clinic since your last visit? Hospitalized since your last visit? -no    2. Have you seen or consulted any other health care providers outside of the [address] [place] [address] since your last visit? Include any pap smears or colon screening. -PCP

## 2019-03-14 LAB
EST. AVERAGE GLUCOSE BLD GHB EST-MCNC: 192 MG/DL
HBA1C MFR BLD: 8.3 % (ref 4.8–5.6)

## 2019-03-15 ENCOUNTER — TELEPHONE (OUTPATIENT)
Dept: ENDOCRINOLOGY | Age: 69
End: 2019-03-15

## 2019-03-15 NOTE — TELEPHONE ENCOUNTER
----- Message from Natasha Cobian sent at 3/15/2019  2:25 PM EDT -----  Regarding: /Telephone  Pt is requesting a call back in reference to questions to her medication. Best contact number is [phone].

## 2019-03-15 NOTE — TELEPHONE ENCOUNTER
Pt stated she was told to do the metformin 1000 mg once a day but received metformin xr 750 BID from pharmacy. Pt wants to know what to take. Informed pt that per Dr. Croea Favors since the regular 1000 mg was bothering her stomach she was to decrease to once a day. She sent in the extended release to replace the 1000 mg and once she got it, if she can tolerate it, she can take it BID, if not do the new  mg daily stop the 1000 mg. Pt verbalized understanding with no further questions or concerns at this time.

## 2019-03-21 ENCOUNTER — TELEPHONE (OUTPATIENT)
Dept: ENDOCRINOLOGY | Age: 69
End: 2019-03-21

## 2019-03-21 NOTE — TELEPHONE ENCOUNTER
Pt states she is concerned about her BG. Pt says she was told at last visit that metformin will be decreased and she is currently taking metformin xr 750 mg BID and feels it may be too much. She says having low BG and her has severe diarrhea to the point where she started wearing depends. Pt states after she has diarrhea her stomach is extremely sore. She is asking should she be taking 1 tab a day instead?  She is taking all other meds as prescribed per pt.    03/19- 110 AM    114 PM    03/20- 96 AM   21 PM    03/21- 86 AM

## 2019-03-21 NOTE — TELEPHONE ENCOUNTER
----- Message from Sveta Orozco sent at 3/21/2019 12:45 PM EDT -----  Regarding: Dr. Mariana Crouch  Pt stated her sugar level is dropping low and numbers are changing. Pt stated  Rx was changed (Metformin 750 mg 2x a day from once a day) and is giving her diarrhea. Pt requested a call from the nurse or doctor today. Best contact number [phone].

## 2019-03-21 NOTE — TELEPHONE ENCOUNTER
----- Message from [facility] sent at 3/21/2019 12:23 PM EDT -----  Regarding: Dr. Mateusz Nguyen: [phone]  Pt is requesting a call back from the nurse.

## 2019-03-22 NOTE — TELEPHONE ENCOUNTER
Asked pt to decrease metformin xr to 1 tab daily. Explained if she continues to have low BG with 1 tab of metformin, call the office. Pt verbalized understanding.

## 2019-03-22 NOTE — TELEPHONE ENCOUNTER
----- Message from [employer] sent at 3/22/2019  9:09 AM EDT -----  Regarding: Dr. Lai Barajas  Pt's returning call from yesterday. [phone].

## 2019-03-22 NOTE — TELEPHONE ENCOUNTER
It was a different metformin - if she has diarrhea with ER formulation then take only one metformin     If she continues to have low sugars after decreasing metformin to call us soon

## 2019-05-20 ENCOUNTER — TELEPHONE (OUTPATIENT)
Dept: ENDOCRINOLOGY | Age: 69
End: 2019-05-20

## 2019-05-20 NOTE — TELEPHONE ENCOUNTER
Asked pt to decrease Lantus to 20 units and continue the metformin and actos as prescribed. Pt verbalized understanding.

## 2019-05-20 NOTE — TELEPHONE ENCOUNTER
Pt states the last few days her BG has been lower at night and she's concerned. Pt states she is taking all medications as prescribed. She is concerned her BG will drop while she is sleeping.      05/17- 98 AM   81 PM    05/18- 132 AM    110 PM    05/19- 121 AM   180 PM    05/20- 111 AM

## 2019-05-20 NOTE — TELEPHONE ENCOUNTER
Patient says her blood sugar levels are dropping during day and evening as low as 81. Patient says she thinks her medication should be adjusted.

## 2019-07-02 ENCOUNTER — OP HISTORICAL/CONVERTED ENCOUNTER (OUTPATIENT)
Dept: OTHER | Age: 69
End: 2019-07-02

## 2019-07-22 LAB
HBA1C MFR BLD HPLC: 8 %
LDL-C, EXTERNAL: 73
MICROALBUMIN UR TEST STR-MCNC: 10 MG/DL

## 2019-07-24 ENCOUNTER — OFFICE VISIT (OUTPATIENT)
Dept: ENDOCRINOLOGY | Age: 69
End: 2019-07-24

## 2019-07-24 VITALS
OXYGEN SATURATION: 97 % | HEIGHT: 65 IN | TEMPERATURE: 95.5 F | DIASTOLIC BLOOD PRESSURE: 52 MMHG | HEART RATE: 61 BPM | BODY MASS INDEX: 33.49 KG/M2 | RESPIRATION RATE: 16 BRPM | WEIGHT: 201 LBS | SYSTOLIC BLOOD PRESSURE: 118 MMHG

## 2019-07-24 DIAGNOSIS — E11.65 TYPE 2 DIABETES MELLITUS WITH HYPERGLYCEMIA, WITH LONG-TERM CURRENT USE OF INSULIN (HCC): Primary | ICD-10-CM

## 2019-07-24 DIAGNOSIS — Z79.4 TYPE 2 DIABETES MELLITUS WITH HYPERGLYCEMIA, WITH LONG-TERM CURRENT USE OF INSULIN (HCC): Primary | ICD-10-CM

## 2019-07-24 DIAGNOSIS — E78.2 MIXED HYPERLIPIDEMIA: ICD-10-CM

## 2019-07-24 DIAGNOSIS — I10 ESSENTIAL HYPERTENSION WITH GOAL BLOOD PRESSURE LESS THAN 140/90: ICD-10-CM

## 2019-07-24 RX ORDER — METFORMIN HYDROCHLORIDE 750 MG/1
750 TABLET, EXTENDED RELEASE ORAL DAILY
COMMUNITY
Start: 2019-07-24 | End: 2020-03-03

## 2019-07-24 RX ORDER — INSULIN GLARGINE 100 [IU]/ML
INJECTION, SOLUTION SUBCUTANEOUS
COMMUNITY
Start: 2019-07-24 | End: 2021-08-21

## 2019-07-24 NOTE — PROGRESS NOTES
Ailyn Schuler is a 80 y.o. female here for   Chief Complaint   Patient presents with    Diabetes       Functional glucose monitor and record keeping system? - yes  Eye exam within last year? - ON FILE  Foot exam within last year? - ON FIILE    1. Have you been to the ER, urgent care clinic since your last visit? Hospitalized since your last visit? -no    2. Have you seen or consulted any other health care providers outside of the [address] [place] [address] since your last visit? Include any pap smears or colon screening. -PCP

## 2019-07-24 NOTE — PROGRESS NOTES
[facility]               Missael Gilbert MD        [address] [phone] Fax [phone]               Patient Information  Date:7/26/2019  Name : Jay Sharp 80 y.o.     YOB: 1950         Referred by: Veronique Gonzalez MD         Chief Complaint   Patient presents with    Diabetes       History of Present Illness: Jay Sharp is a 80 y.o. female here for fu of  Type 2 Diabetes Mellitus. Referred by Veronique Gonzalez MD for evaluation and management of uncontrolled diabetes mellitus. She was diagnosed with diabetes in 2008. She has maintained a very good log, checking blood glucose 4 times daily  Did not bring the meter  No further episodes of hypoglycemia  She is off prandial insulin  Occasionally diet  is off      November 2018 history  After the last visit she had called about hypoglycemia, gradually decreased the dose of insulin. She is on 16 units of basal insulin, off prandial insulin. At one point she was on 60 units of basal insulin, 25 units before each meal of prandial insulin  Had UTI and was on antibiotics. Now the blood glucose has been gradually increasing, reportedly no change in the diet      No chest pain, shortness of breath      Wt Readings from Last 3 Encounters:   07/24/19 201 lb (91.2 kg)   03/13/19 206 lb (93.4 kg)   11/05/18 200 lb 12.8 oz (91.1 kg)       BP Readings from Last 3 Encounters:   07/24/19 118/52   03/13/19 114/48   11/05/18 151/71           Past Medical History:   Diagnosis Date    Arthritis     [facility]     [facility] ([facility]     Hyperlipidemia     Hypertension     Seizure ([facility]      Current Outpatient Medications   Medication Sig    metFORMIN ER (GLUCOPHAGE XR) 750 mg tablet Take 1 Tab by mouth daily.  insulin glargine (LANTUS SOLOSTAR U-100 INSULIN) 100 unit/mL (3 mL) inpn Inject 20 units in the morning.     pioglitazone (ACTOS) 30 mg tablet TAKE ONE (1) TABLET, BY MOUTH, DAILY.  FAITH PEN NEEDLE 32 gauge x 5/32\" ndle USE 4 TIMES A DAY, AS INSTRUCTED.  oxybutynin chloride XL (DITROPAN XL) 15 mg CR tablet Take 10 mg by mouth daily.  meloxicam (MOBIC) 15 mg tablet Take 15 mg by mouth daily.  azelastine (OPTIVAR) 0.05 % ophthalmic solution     glucose blood VI test strips (TRUE METRIX GLUCOSE TEST STRIP) strip by Does Not Apply route See Admin Instructions. Use to check blood glucose 4x daily. Dx code: E11.65    insulin syringe-needle U-100 1 mL 31 gauge x 15/64\" syrg 1 Syringe by Does Not Apply route two (2) times a day. Dx code: E11.65    furosemide (LASIX) 40 mg tablet Take 20 mg by mouth daily.  atorvastatin (LIPITOR) 40 mg tablet Take 40 mg by mouth nightly.  lisinopril (PRINIVIL, ZESTRIL) 5 mg tablet Take 5 mg by mouth daily.  levETIRAcetam (KEPPRA) 500 mg tablet Take  by mouth two (2) times a day.  aspirin delayed-release 81 mg tablet Take  by mouth daily.  potassium chloride (KAON 10%) 20 mEq/15 mL solution Take  by mouth daily. PILL    RESTASIS 0.05 % ophthalmic emulsion      No current facility-administered medications for this visit. Allergies   Allergen Reactions    Oxycodone-Acetaminophen Unknown (comments)    Vicodin [Hydrocodone-Acetaminophen] Unknown (comments)         Review of Systems:  -   - Gastrointestinal: no dysphagia no  abdominal pain  - Musculoskeletal: +  joint pains +   weakness  - Integumentary: no rashes  - Neurological: +  numbness, tingling, no  headaches  - Psychiatric: no depression no  anxiety  - Endocrine: no heat or cold intolerance    Physical Examination:   Blood pressure 118/52, pulse 61, temperature 95.5 °F (35.3 °C), temperature source Oral, resp. rate 16, height 5' 5\" (1.651 m), weight 201 lb (91.2 kg), SpO2 97 %. Estimated body mass index is 33.45 kg/m² as calculated from the following:    Height as of this encounter: 5' 5\" (1.651 m).   -   Weight as of this encounter: 201 lb (91.2 kg).  - General: pleasant, no distress, good eye contact  - HEENT: no pallor, no periorbital edema, EOMI  - Neck: supple, no thyromegaly, no nodules  - Cardiovascular: regular, normal rate, normal S1 and S2  - Respiratory: clear to auscultation bilaterally  - Musculoskeletal: no edema,   - Neurological:alert and oriented  - Psychiatric: normal mood and affect  - Skin: color, texture, turgor normal.   Diabetic foot exam: Mar 2019     Left:     Vibratory sensation absent   Filament test normal sensation with micro filament   Pulse DP: 1+    Deformities: None  Right:    Vibratory sensation absent   Filament test normal sensation with micro filament   Pulse DP: 1+   Deformities: None              Assessment/Plan:     1. Type 2 diabetes mellitus with hyperglycemia, with long-term current use of insulin ([address]    2. Essential hypertension with goal blood pressure less than 140/90        1. Type 2 Diabetes Mellitus       Lab Results   Component Value Date/Time    Hemoglobin A1c 8.3 (H) 03/13/2019 11:04 AM    Hemoglobin A1c (POC) 8 11/05/2018 11:20 AM    Hemoglobin A1c, External 8.0 06/02/2018     Blood glucose log and A1c do not correlate, suspect that some of the numbers are not true readings  She was asked to bring the meter last visit also, stressed the importance of bringing the meter   Lantus 20 units once daily,   Stressed the importance of eating a healthy diet. Actos  Metformin 1000 mg AM   ASA      There are no Patient Instructions on file for this visit. FLU annually ,Pneumovax ,aspirin daily,annual eye exam,microalbumin    2. HTN : Continue current therapy ,     3. Hyperlipidemia : Continue statin. 4.Obesity:Body mass index is 33.45 kg/m². Discussed about the importance of exercise and carbohydrate portion control. There are no Patient Instructions on file for this visit. Follow-up and Dispositions    · Return in about 4 months (around 11/24/2019).          Thank you for allowing me to participate in the care of this patient.     Nusrat Evangelista MD      Patient verbalized understanding

## 2019-08-01 ENCOUNTER — TELEPHONE (OUTPATIENT)
Dept: ENDOCRINOLOGY | Age: 69
End: 2019-08-01

## 2019-08-01 NOTE — TELEPHONE ENCOUNTER
----- Message from Hawa Urias sent at 8/1/2019  8:53 AM EDT -----  Regarding: Dr. Elisa Molina first and last name: Theresa Masterson      Reason for call: Advising her PCP took her off vitamins and her  blood sugar was up      Callback required yes/no and why: Yes      Best contact number(s): [phone]      Details to clarify the request:      Hawa Urias

## 2019-08-01 NOTE — TELEPHONE ENCOUNTER
Returned pt's call and she stated she saw her PCP on 7/24/19 and her BG was high. She also had a death in the family which may be part of it. Informed pt that she was seen here the same day and Dr. Julianne Robles went over her BG with her. Went over meds with pt and she is taking everything as prescribed. She doesn't have her log on her but stated this morning her BG was 129. Informed pt that that was a good reading and if she has any high readings to give us a call and make sure she has all readings on her. Pt verbalized understanding with no further questions or concerns at this time.

## 2019-08-03 ENCOUNTER — OP HISTORICAL/CONVERTED ENCOUNTER (OUTPATIENT)
Dept: OTHER | Age: 69
End: 2019-08-03

## 2019-08-21 ENCOUNTER — TELEPHONE (OUTPATIENT)
Dept: ENDOCRINOLOGY | Age: 69
End: 2019-08-21

## 2019-08-21 NOTE — TELEPHONE ENCOUNTER
Pt was recently in the hospital for fainting and has been released. Since being release Deborah mello from [facility] pt's BG has been running 170-213. Has not been below 170. Pt is taking Lanuts 20 units daily, actos and metformin. Pt is asking should she come to office for an earlier appt. She mentioned she was looking for another physician closer to her location but she wants to know what to do until she finds a physician.

## 2020-01-28 DIAGNOSIS — E11.65 UNCONTROLLED TYPE 2 DIABETES MELLITUS WITH HYPERGLYCEMIA, WITH LONG-TERM CURRENT USE OF INSULIN (HCC): ICD-10-CM

## 2020-01-28 DIAGNOSIS — Z79.4 UNCONTROLLED TYPE 2 DIABETES MELLITUS WITH HYPERGLYCEMIA, WITH LONG-TERM CURRENT USE OF INSULIN (HCC): ICD-10-CM

## 2020-01-28 RX ORDER — PIOGLITAZONEHYDROCHLORIDE 30 MG/1
TABLET ORAL
Qty: 90 TAB | Refills: 0 | Status: SHIPPED | OUTPATIENT
Start: 2020-01-28 | End: 2020-04-28

## 2020-03-03 DIAGNOSIS — E11.65 TYPE 2 DIABETES MELLITUS WITH HYPERGLYCEMIA, WITH LONG-TERM CURRENT USE OF INSULIN (HCC): ICD-10-CM

## 2020-03-03 DIAGNOSIS — Z79.4 TYPE 2 DIABETES MELLITUS WITH HYPERGLYCEMIA, WITH LONG-TERM CURRENT USE OF INSULIN (HCC): ICD-10-CM

## 2020-03-03 RX ORDER — METFORMIN HYDROCHLORIDE 750 MG/1
TABLET, EXTENDED RELEASE ORAL
Qty: 180 TAB | Refills: 1 | Status: SHIPPED | OUTPATIENT
Start: 2020-03-03 | End: 2022-02-04 | Stop reason: ALTCHOICE

## 2020-05-23 DIAGNOSIS — Z79.4 UNCONTROLLED TYPE 2 DIABETES MELLITUS WITH HYPERGLYCEMIA, WITH LONG-TERM CURRENT USE OF INSULIN (HCC): ICD-10-CM

## 2020-05-23 DIAGNOSIS — E11.65 UNCONTROLLED TYPE 2 DIABETES MELLITUS WITH HYPERGLYCEMIA, WITH LONG-TERM CURRENT USE OF INSULIN (HCC): ICD-10-CM

## 2020-05-23 RX ORDER — PIOGLITAZONEHYDROCHLORIDE 30 MG/1
TABLET ORAL
Qty: 30 TAB | Refills: 0 | Status: SHIPPED | OUTPATIENT
Start: 2020-05-23

## 2020-12-10 ENCOUNTER — HOSPITAL ENCOUNTER (EMERGENCY)
Age: 70
Discharge: HOME | End: 2020-12-10
Attending: EMERGENCY MEDICINE
Payer: MEDICARE

## 2020-12-10 ENCOUNTER — APPOINTMENT (OUTPATIENT)
Dept: GENERAL RADIOLOGY | Age: 70
End: 2020-12-10
Attending: EMERGENCY MEDICINE
Payer: MEDICARE

## 2020-12-10 VITALS
SYSTOLIC BLOOD PRESSURE: 134 MMHG | DIASTOLIC BLOOD PRESSURE: 59 MMHG | RESPIRATION RATE: 16 BRPM | HEART RATE: 73 BPM | TEMPERATURE: 97.7 F

## 2020-12-10 DIAGNOSIS — R73.9 HYPERGLYCEMIA: ICD-10-CM

## 2020-12-10 DIAGNOSIS — R07.89 MUSCULOSKELETAL CHEST PAIN: Primary | ICD-10-CM

## 2020-12-10 LAB
ALBUMIN SERPL-MCNC: 3.4 G/DL (ref 3.4–5)
ALBUMIN/GLOB SERPL: 0.9 {RATIO} (ref 0.8–1.7)
ALP SERPL-CCNC: 96 U/L (ref 45–117)
ALT SERPL-CCNC: 30 U/L (ref 13–56)
ANION GAP SERPL CALC-SCNC: 4 MMOL/L (ref 3–18)
AST SERPL-CCNC: 24 U/L (ref 10–38)
ATRIAL RATE: 66 BPM
BASOPHILS # BLD: 0 K/UL (ref 0–0.1)
BASOPHILS NFR BLD: 0 % (ref 0–2)
BILIRUB SERPL-MCNC: 0.7 MG/DL (ref 0.2–1)
BUN SERPL-MCNC: 9 MG/DL (ref 7–18)
BUN/CREAT SERPL: 10 (ref 12–20)
CALCIUM SERPL-MCNC: 8.9 MG/DL (ref 8.5–10.1)
CALCULATED P AXIS, ECG09: 21 DEGREES
CALCULATED R AXIS, ECG10: 0 DEGREES
CALCULATED T AXIS, ECG11: 34 DEGREES
CHLORIDE SERPL-SCNC: 101 MMOL/L (ref 100–111)
CO2 SERPL-SCNC: 29 MMOL/L (ref 21–32)
CREAT SERPL-MCNC: 0.88 MG/DL (ref 0.6–1.3)
DIAGNOSIS, 93000: NORMAL
DIFFERENTIAL METHOD BLD: ABNORMAL
EOSINOPHIL # BLD: 0 K/UL (ref 0–0.4)
EOSINOPHIL NFR BLD: 0 % (ref 0–5)
ERYTHROCYTE [DISTWIDTH] IN BLOOD BY AUTOMATED COUNT: 11.7 % (ref 11.6–14.5)
GLOBULIN SER CALC-MCNC: 3.8 G/DL (ref 2–4)
GLUCOSE BLD STRIP.AUTO-MCNC: 305 MG/DL (ref 70–110)
GLUCOSE SERPL-MCNC: 384 MG/DL (ref 74–99)
HCT VFR BLD AUTO: 37.9 % (ref 35–45)
HGB BLD-MCNC: 13 G/DL (ref 12–16)
LYMPHOCYTES # BLD: 2 K/UL (ref 0.9–3.6)
LYMPHOCYTES NFR BLD: 41 % (ref 21–52)
MAGNESIUM SERPL-MCNC: 1.8 MG/DL (ref 1.6–2.6)
MCH RBC QN AUTO: 33 PG (ref 24–34)
MCHC RBC AUTO-ENTMCNC: 34.3 G/DL (ref 31–37)
MCV RBC AUTO: 96.2 FL (ref 74–97)
MONOCYTES # BLD: 0.6 K/UL (ref 0.05–1.2)
MONOCYTES NFR BLD: 12 % (ref 3–10)
NEUTS SEG # BLD: 2.3 K/UL (ref 1.8–8)
NEUTS SEG NFR BLD: 47 % (ref 40–73)
P-R INTERVAL, ECG05: 130 MS
PLATELET # BLD AUTO: 143 K/UL (ref 135–420)
PMV BLD AUTO: 11.8 FL (ref 9.2–11.8)
POTASSIUM SERPL-SCNC: 3.9 MMOL/L (ref 3.5–5.5)
PROT SERPL-MCNC: 7.2 G/DL (ref 6.4–8.2)
Q-T INTERVAL, ECG07: 398 MS
QRS DURATION, ECG06: 78 MS
QTC CALCULATION (BEZET), ECG08: 417 MS
RBC # BLD AUTO: 3.94 M/UL (ref 4.2–5.3)
SODIUM SERPL-SCNC: 134 MMOL/L (ref 136–145)
TROPONIN I SERPL-MCNC: <0.02 NG/ML (ref 0–0.04)
VENTRICULAR RATE, ECG03: 66 BPM
WBC # BLD AUTO: 4.9 K/UL (ref 4.6–13.2)

## 2020-12-10 PROCEDURE — 84484 ASSAY OF TROPONIN QUANT: CPT

## 2020-12-10 PROCEDURE — 96361 HYDRATE IV INFUSION ADD-ON: CPT

## 2020-12-10 PROCEDURE — 82962 GLUCOSE BLOOD TEST: CPT

## 2020-12-10 PROCEDURE — 71045 X-RAY EXAM CHEST 1 VIEW: CPT

## 2020-12-10 PROCEDURE — 96374 THER/PROPH/DIAG INJ IV PUSH: CPT

## 2020-12-10 PROCEDURE — 83735 ASSAY OF MAGNESIUM: CPT

## 2020-12-10 PROCEDURE — 74011636637 HC RX REV CODE- 636/637: Performed by: EMERGENCY MEDICINE

## 2020-12-10 PROCEDURE — 93005 ELECTROCARDIOGRAM TRACING: CPT

## 2020-12-10 PROCEDURE — 99284 EMERGENCY DEPT VISIT MOD MDM: CPT

## 2020-12-10 PROCEDURE — 85025 COMPLETE CBC W/AUTO DIFF WBC: CPT

## 2020-12-10 PROCEDURE — 74011250636 HC RX REV CODE- 250/636: Performed by: EMERGENCY MEDICINE

## 2020-12-10 PROCEDURE — 80053 COMPREHEN METABOLIC PANEL: CPT

## 2020-12-10 RX ORDER — KETOROLAC TROMETHAMINE 15 MG/ML
15 INJECTION, SOLUTION INTRAMUSCULAR; INTRAVENOUS ONCE
Status: COMPLETED | OUTPATIENT
Start: 2020-12-10 | End: 2020-12-10

## 2020-12-10 RX ADMIN — SODIUM CHLORIDE 1000 ML: 900 INJECTION, SOLUTION INTRAVENOUS at 11:22

## 2020-12-10 RX ADMIN — KETOROLAC TROMETHAMINE 15 MG: 15 INJECTION, SOLUTION INTRAMUSCULAR; INTRAVENOUS at 11:22

## 2020-12-10 RX ADMIN — INSULIN HUMAN 6 UNITS: 100 INJECTION, SOLUTION PARENTERAL at 11:22

## 2020-12-10 NOTE — ED PROVIDER NOTES
EMERGENCY DEPARTMENT HISTORY AND PHYSICAL EXAM    10:13 AM  Date: 12/10/2020  Patient Name: Sonido Giles    History of Presenting Illness     Chief Complaint   Patient presents with    Fatigue    High Blood Sugar        History Provided By: Patient    HPI: Sonido Giles is a 72 y.o. female with history of multiple medical problems as below including diabetes and arthritis. Patient was sent from her primary care doctor's office for worsening of her right forearm and right leg pain. She was also found to have elevated blood sugar. Per the patient this pain is similar to her previous episodes of arthritis but it got worse today. Denies weakness or numbness. No history of chest pain or shortness of breath. No fever or respiratory symptoms. Location:  Severity:  Timing/course: Onset/Duration:     PCP: Terisa Snellen, MD    Past History     Past Medical History:  Past Medical History:   Diagnosis Date    Arthritis     [facility] [facility]     Diabetes ([facility] [facility]     Hyperlipidemia     Hypertension     Seizure ([facility] [address]        Past Surgical History:  Past Surgical History:   Procedure Laterality Date    HX CHOLECYSTECTOMY  2014       Family History:  Family History   Problem Relation Age of Onset    Cancer Mother         stomach cancer    Diabetes Mother     Hypertension Mother     Diabetes Sister     Hypertension Sister        Social History:  Social History     Tobacco Use    Smoking status: Former Smoker     Types: Cigarettes    Smokeless tobacco: Never Used   Substance Use Topics    Alcohol use: No    Drug use: No       Allergies: Allergies   Allergen Reactions    Oxycodone-Acetaminophen Unknown (comments)    Vicodin [Hydrocodone-Acetaminophen] Unknown (comments)       Review of Systems   Review of Systems   Musculoskeletal: Positive for arthralgias and myalgias. All other systems reviewed and are negative.        Physical Exam     Patient Vitals for the past 12 hrs:   Temp Pulse Resp BP 12/10/20 1000 97.7 °F (36.5 °C) 73 16 (!) 134/59       Physical Exam  Vitals signs and nursing note reviewed. Constitutional:       Appearance: Normal appearance. HENT:      Head: Normocephalic and atraumatic. Eyes:      Extraocular Movements: Extraocular movements intact. Neck:      Musculoskeletal: Normal range of motion and neck supple. Cardiovascular:      Rate and Rhythm: Normal rate. Pulmonary:      Effort: Pulmonary effort is normal. No respiratory distress. Musculoskeletal: Normal range of motion. General: No swelling or deformity. Skin:     General: Skin is warm and dry. Neurological:      General: No focal deficit present. Mental Status: She is alert and oriented to person, place, and time. Psychiatric:         Mood and Affect: Mood normal.         Behavior: Behavior normal.         Diagnostic Study Results     Labs -  Recent Results (from the past 12 hour(s))   EKG, 12 LEAD, INITIAL    Collection Time: 12/10/20 10:03 AM   Result Value Ref Range    Ventricular Rate 66 BPM    Atrial Rate 66 BPM    P-R Interval 130 ms    QRS Duration 78 ms    Q-T Interval 398 ms    QTC Calculation (Bezet) 417 ms    Calculated P Axis 21 degrees    Calculated R Axis 0 degrees    Calculated T Axis 34 degrees    Diagnosis       Normal sinus rhythm  Normal ECG  No previous ECGs available         Radiologic Studies -   No results found. Medical Decision Making     ED Course: Progress Notes, Reevaluation, and Consults:    10:13 AM Initial assessment performed. The patients presenting problems have been discussed, and they/their family are in agreement with the care plan formulated and outlined with them. I have encouraged them to ask questions as they arise throughout their visit. Provider Notes (Medical Decision Making): 78-year-old female history of arthritis and diabetes sent from her primary care doctor for worsening of her arthritis pain.   Well-appearing on exam and not in distress. No obvious deformities on examination and no joint effusion signs of infection. Neuro exam is normal.  Will get screening labs including cardiac work-up since she has right arm pain and treat accordingly. Procedures:     Critical Care Time:     Vital Signs-Reviewed the patient's vital signs. Reviewed pt's pulse ox reading. EKG: Interpreted by the EP. Time Interpreted:    Rate:    Rhythm:    Interpretation:   Comparison:     Records Reviewed: Nursing Notes and Old Medical Records (Time of Review: 10:13 AM)  -I am the first provider for this patient.  -I reviewed the vital signs, available nursing notes, past medical history, past surgical history, family history and social history. Current Outpatient Medications   Medication Sig Dispense Refill    pioglitazone (ACTOS) 30 mg tablet TAKE 1 TABLET BY MOUTH EVERY DAY 30 Tab 0    metFORMIN ER (GLUCOPHAGE XR) 750 mg tablet TAKE 1 TABLET BY MOUTH TWICE A  Tab 1    insulin glargine (LANTUS SOLOSTAR U-100 INSULIN) 100 unit/mL (3 mL) inpn Inject 28 units in the morning.  FAITH PEN NEEDLE 32 gauge x 5/32\" ndle USE 4 TIMES A DAY, AS INSTRUCTED. 200 Pen Needle 2    oxybutynin chloride XL (DITROPAN XL) 15 mg CR tablet Take 10 mg by mouth daily.  meloxicam (MOBIC) 15 mg tablet Take 15 mg by mouth daily.  azelastine (OPTIVAR) 0.05 % ophthalmic solution       RESTASIS 0.05 % ophthalmic emulsion       glucose blood VI test strips (TRUE METRIX GLUCOSE TEST STRIP) strip by Does Not Apply route See Admin Instructions. Use to check blood glucose 4x daily. Dx code: E11.65      insulin syringe-needle U-100 1 mL 31 gauge x 15/64\" syrg 1 Syringe by Does Not Apply route two (2) times a day. Dx code: E11.65 100 Pen Needle 11    furosemide (LASIX) 40 mg tablet Take 20 mg by mouth daily.  atorvastatin (LIPITOR) 40 mg tablet Take 40 mg by mouth nightly.  lisinopril (PRINIVIL, ZESTRIL) 5 mg tablet Take 5 mg by mouth daily.       levETIRAcetam (KEPPRA) 500 mg tablet Take  by mouth two (2) times a day.  aspirin delayed-release 81 mg tablet Take  by mouth daily.  potassium chloride (KAON 10%) 20 mEq/15 mL solution Take  by mouth daily. PILL          Clinical Impression     Clinical Impression: No diagnosis found. Disposition: DC        This note was dictated utilizing voice recognition software which may lead to typographical errors. I apologize in advance if the situation occurs. If questions arise please do not hesitate to contact me or call our department.     Penelope Solano MD  10:13 AM

## 2020-12-10 NOTE — DISCHARGE INSTRUCTIONS
Patient Education        Musculoskeletal Chest Pain: Care Instructions  Your Care Instructions     Chest pain is not always a sign that something is wrong with your heart or that you have another serious problem. The doctor thinks your chest pain is caused by strained muscles or ligaments, inflamed chest cartilage, or another problem in your chest, rather than by your heart. You may need more tests to find the cause of your chest pain. Follow-up care is a key part of your treatment and safety. Be sure to make and go to all appointments, and call your doctor if you are having problems. It's also a good idea to know your test results and keep a list of the medicines you take. How can you care for yourself at home? · Take pain medicines exactly as directed. ? If the doctor gave you a prescription medicine for pain, take it as prescribed. ? If you are not taking a prescription pain medicine, ask your doctor if you can take an over-the-counter medicine. · Rest and protect the sore area. · Stop, change, or take a break from any activity that may be causing your pain or soreness. · Put ice or a cold pack on the sore area for 10 to 20 minutes at a time. Try to do this every 1 to 2 hours for the next 3 days (when you are awake) or until the swelling goes down. Put a thin cloth between the ice and your skin. · After 2 or 3 days, apply a heating pad set on low or a warm cloth to the area that hurts. Some doctors suggest that you go back and forth between hot and cold. · Do not wrap or tape your ribs for support. This may cause you to take smaller breaths, which could increase your risk of lung problems. · Mentholated creams such as Bengay or Icy Hot may soothe sore muscles. Follow the instructions on the package. · Follow your doctor's instructions for exercising. · Gentle stretching and massage may help you get better faster.  Stretch slowly to the point just before pain begins, and hold the stretch for at least 15 to 30 seconds. Do this 3 or 4 times a day. Stretch just after you have applied heat. · As your pain gets better, slowly return to your normal activities. Any increased pain may be a sign that you need to rest a while longer. When should you call for help? Call 911 anytime you think you may need emergency care. For example, call if:    · You have chest pain or pressure. This may occur with:  ? Sweating. ? Shortness of breath. ? Nausea or vomiting. ? Pain that spreads from the chest to the neck, jaw, or one or both shoulders or arms. ? Dizziness or lightheadedness. ? A fast or uneven pulse. After calling 911, chew 1 adult-strength aspirin. Wait for an ambulance. Do not try to drive yourself.     · You have sudden chest pain and shortness of breath, or you cough up blood. Call your doctor now or seek immediate medical care if:    · You have any trouble breathing.     · Your chest pain gets worse.     · Your chest pain occurs consistently with exercise and is relieved by rest.   Watch closely for changes in your health, and be sure to contact your doctor if:    · Your chest pain does not get better after 1 week. Where can you learn more? Go to http://sharon-eli.info/  Enter V293 in the search box to learn more about \"Musculoskeletal Chest Pain: Care Instructions. \"  Current as of: June 26, 2019               Content Version: 12.6  © 1462-3400 [employer]. Care instructions adapted under license by [employer] (which disclaims liability or warranty for this information). If you have questions about a medical condition or this instruction, always ask your healthcare professional. [place] [address] any warranty or liability for your use of this information. Patient Education        Learning About High Blood Sugar  What is high blood sugar? Your body turns the food you eat into glucose (sugar), which it uses for energy.  But if your body isn't able to use the sugar right away, it can build up in your blood and lead to high blood sugar. When the amount of sugar in your blood stays too high for too much of the time, you may have diabetes. Diabetes is a disease that can cause serious health problems. The good news is that lifestyle changes may help you get your blood sugar back to normal and avoid or delay diabetes. What causes high blood sugar? Sugar (glucose) can build up in your blood if you:  · Are overweight. · Have a family history of diabetes. · Take certain medicines, such as steroids. What are the symptoms? Having high blood sugar may not cause any symptoms at all. Or it may make you feel very thirsty or very hungry. You may also urinate more often than usual, have blurry vision, or lose weight without trying. How is high blood sugar treated? You can take steps to lower your blood sugar level if you understand what makes it get higher. Your doctor may want you to learn how to test your blood sugar level at home. Then you can see how illness, stress, or different kinds of food or medicine raise or lower your blood sugar level. Other tests may be needed to see if you have diabetes. How can you prevent high blood sugar? · Watch your weight. If you're overweight, losing just a small amount of weight may help. Reducing fat around your waist is most important. · Limit the amount of calories, sweets, and unhealthy fat you eat. Ask your doctor if a dietitian can help you. A registered dietitian can help you create meal plans that fit your lifestyle. · Get at least 30 minutes of exercise on most days of the week. Exercise helps control your blood sugar. It also helps you maintain a healthy weight. Walking is a good choice. You also may want to do other activities, such as running, swimming, cycling, or playing tennis or team sports. · If your doctor prescribed medicines, take them exactly as prescribed.  Call your doctor if you think you are having a problem with your medicine. You will get more details on the specific medicines your doctor prescribes. Follow-up care is a key part of your treatment and safety. Be sure to make and go to all appointments, and call your doctor if you are having problems. It's also a good idea to know your test results and keep a list of the medicines you take. Where can you learn more? Go to http://www.gray.com/  Enter O108 in the search box to learn more about \"Learning About High Blood Sugar. \"  Current as of: December 20, 2019               Content Version: 12.6  © 5947-3569 [employer]. Care instructions adapted under license by [employer] (which disclaims liability or warranty for this information). If you have questions about a medical condition or this instruction, always ask your healthcare professional. [address] any warranty or liability for your use of this information.

## 2020-12-10 NOTE — ED NOTES
George Rollins is a 72 y.o. female that was discharged in stable condition. The patients diagnosis, condition and treatment were explained to  patient and aftercare instructions were given. The patient verbalized understanding. Patient armband removed and shredded.

## 2020-12-10 NOTE — ED TRIAGE NOTES
Patient came by EMS from the Doctor office with the c/o weakness to the right arm and leg with elevated blood sugar 460.

## 2020-12-12 ENCOUNTER — HOSPITAL ENCOUNTER (EMERGENCY)
Age: 70
Discharge: HOME | End: 2020-12-12
Attending: EMERGENCY MEDICINE
Payer: MEDICARE

## 2020-12-12 ENCOUNTER — APPOINTMENT (OUTPATIENT)
Dept: CT IMAGING | Age: 70
End: 2020-12-12
Attending: EMERGENCY MEDICINE
Payer: MEDICARE

## 2020-12-12 VITALS
HEIGHT: 65 IN | WEIGHT: 194 LBS | DIASTOLIC BLOOD PRESSURE: 71 MMHG | HEART RATE: 61 BPM | OXYGEN SATURATION: 98 % | SYSTOLIC BLOOD PRESSURE: 124 MMHG | TEMPERATURE: 98.1 F | RESPIRATION RATE: 16 BRPM | BODY MASS INDEX: 32.32 KG/M2

## 2020-12-12 DIAGNOSIS — R56.9 SEIZURE (HCC): Primary | ICD-10-CM

## 2020-12-12 LAB
ALBUMIN SERPL-MCNC: 3.2 G/DL (ref 3.5–5)
ALBUMIN/GLOB SERPL: 1 {RATIO} (ref 1.1–2.2)
ALP SERPL-CCNC: 82 U/L (ref 45–117)
ALT SERPL-CCNC: 27 U/L (ref 12–78)
ANION GAP SERPL CALC-SCNC: 5 MMOL/L (ref 5–15)
APPEARANCE UR: CLEAR
AST SERPL W P-5'-P-CCNC: 22 U/L (ref 15–37)
BACTERIA URNS QL MICRO: NEGATIVE /HPF
BASOPHILS # BLD: 0 K/UL (ref 0–0.1)
BASOPHILS NFR BLD: 0 % (ref 0–1)
BILIRUB SERPL-MCNC: 0.7 MG/DL (ref 0.2–1)
BILIRUB UR QL: NEGATIVE
BUN SERPL-MCNC: 9 MG/DL (ref 6–20)
BUN/CREAT SERPL: 12 (ref 12–20)
CA-I BLD-MCNC: 8.5 MG/DL (ref 8.5–10.1)
CHLORIDE SERPL-SCNC: 106 MMOL/L (ref 97–108)
CO2 SERPL-SCNC: 26 MMOL/L (ref 21–32)
COLOR UR: ABNORMAL
CREAT SERPL-MCNC: 0.75 MG/DL (ref 0.55–1.02)
DIFFERENTIAL METHOD BLD: ABNORMAL
EOSINOPHIL # BLD: 0 K/UL (ref 0–0.4)
EOSINOPHIL NFR BLD: 0 % (ref 0–7)
ERYTHROCYTE [DISTWIDTH] IN BLOOD BY AUTOMATED COUNT: 12 % (ref 11.5–14.5)
GLOBULIN SER CALC-MCNC: 3.1 G/DL (ref 2–4)
GLUCOSE SERPL-MCNC: 333 MG/DL (ref 65–100)
GLUCOSE UR STRIP.AUTO-MCNC: 150 MG/DL
HCT VFR BLD AUTO: 35.2 % (ref 35–47)
HGB BLD-MCNC: 11.6 G/DL (ref 11.5–16)
HGB UR QL STRIP: NEGATIVE
IMM GRANULOCYTES # BLD AUTO: 0 K/UL (ref 0–0.04)
IMM GRANULOCYTES NFR BLD AUTO: 0 % (ref 0–0.5)
KETONES UR QL STRIP.AUTO: NEGATIVE MG/DL
LACTATE SERPL-SCNC: 1.9 MMOL/L (ref 0.4–2)
LEUKOCYTE ESTERASE UR QL STRIP.AUTO: ABNORMAL
LYMPHOCYTES # BLD: 1.2 K/UL (ref 0.8–3.5)
LYMPHOCYTES NFR BLD: 31 % (ref 12–49)
MCH RBC QN AUTO: 31.7 PG (ref 26–34)
MCHC RBC AUTO-ENTMCNC: 33 G/DL (ref 30–36.5)
MCV RBC AUTO: 96.2 FL (ref 80–99)
MONOCYTES # BLD: 0.4 K/UL (ref 0–1)
MONOCYTES NFR BLD: 10 % (ref 5–13)
NEUTS SEG # BLD: 2.3 K/UL (ref 1.8–8)
NEUTS SEG NFR BLD: 59 % (ref 32–75)
NITRITE UR QL STRIP.AUTO: NEGATIVE
PH UR STRIP: 5 [PH] (ref 5–8)
PLATELET # BLD AUTO: 144 K/UL (ref 150–400)
PMV BLD AUTO: 11.9 FL (ref 8.9–12.9)
POTASSIUM SERPL-SCNC: 3.7 MMOL/L (ref 3.5–5.1)
PROT SERPL-MCNC: 6.3 G/DL (ref 6.4–8.2)
PROT UR STRIP-MCNC: NEGATIVE MG/DL
RBC # BLD AUTO: 3.66 M/UL (ref 3.8–5.2)
RBC #/AREA URNS HPF: ABNORMAL /HPF (ref 0–5)
SODIUM SERPL-SCNC: 137 MMOL/L (ref 136–145)
SP GR UR REFRACTOMETRY: 1.01 (ref 1–1.03)
UROBILINOGEN UR QL STRIP.AUTO: 0.1 EU/DL (ref 0.1–1)
WBC # BLD AUTO: 3.8 K/UL (ref 3.6–11)
WBC URNS QL MICRO: ABNORMAL /HPF (ref 0–4)

## 2020-12-12 PROCEDURE — 99285 EMERGENCY DEPT VISIT HI MDM: CPT

## 2020-12-12 PROCEDURE — 85025 COMPLETE CBC W/AUTO DIFF WBC: CPT

## 2020-12-12 PROCEDURE — 83605 ASSAY OF LACTIC ACID: CPT

## 2020-12-12 PROCEDURE — 81001 URINALYSIS AUTO W/SCOPE: CPT

## 2020-12-12 PROCEDURE — 70450 CT HEAD/BRAIN W/O DYE: CPT

## 2020-12-12 PROCEDURE — 36415 COLL VENOUS BLD VENIPUNCTURE: CPT

## 2020-12-12 PROCEDURE — 80053 COMPREHEN METABOLIC PANEL: CPT

## 2020-12-12 PROCEDURE — 93005 ELECTROCARDIOGRAM TRACING: CPT

## 2020-12-12 PROCEDURE — 74011250637 HC RX REV CODE- 250/637: Performed by: EMERGENCY MEDICINE

## 2020-12-12 RX ORDER — LEVETIRACETAM 250 MG/1
500 TABLET ORAL
Status: COMPLETED | OUTPATIENT
Start: 2020-12-12 | End: 2020-12-12

## 2020-12-12 RX ADMIN — LEVETIRACETAM 500 MG: 250 TABLET ORAL at 13:37

## 2020-12-12 NOTE — ED TRIAGE NOTES
EMS stated called for seizure. Witnessed ~ 1 min tonic clonic today activity. no meds given. Pt stated she has had pain and numbness down right side of body. Pt been told by two different doctors either stroke or seizure related.

## 2020-12-12 NOTE — ED PROVIDER NOTES
EMERGENCY DEPARTMENT HISTORY AND PHYSICAL EXAM      Date: 12/12/2020  Patient Name: Loy Barthel    History of Presenting Illness     Chief Complaint   Patient presents with    Seizure       History Provided By: Patient    HPI: Loy Barthel, 72 y.o. female with a past medical history significant diabetes and seizure presents to the ED with cc of seizure. EMS reports that the patient had a 1 min witnessed tonic clonic seziure. NO meds were given. Patient states that she has had a history of seizures in the past and is normally on Keppra. She states that she has been compliant with her medications and has not had any changes in her dosing recently. She states that she has been seen in the past for seizure and there was a question of whether she might have had a stroke that caused that seizure in the past.  She states that she has baseline numbness and tingling in her bilateral lower extremities with the right worse than left. She denies any speech changes or new weakness at this time. She states that she has a little bit of a headache on the left side, but denies any vision changes. She also states that she is having some soreness on the right side of her body but no associated weakness. She denies any associated chest pain or shortness of breath. There are no other complaints, changes, or physical findings at this time. PCP: Josiah Hall MD    No current facility-administered medications on file prior to encounter. Current Outpatient Medications on File Prior to Encounter   Medication Sig Dispense Refill    pioglitazone (ACTOS) 30 mg tablet TAKE 1 TABLET BY MOUTH EVERY DAY 30 Tab 0    metFORMIN ER (GLUCOPHAGE XR) 750 mg tablet TAKE 1 TABLET BY MOUTH TWICE A  Tab 1    insulin glargine (LANTUS SOLOSTAR U-100 INSULIN) 100 unit/mL (3 mL) inpn Inject 28 units in the morning.       FAITH PEN NEEDLE 32 gauge x 5/32\" ndle USE 4 TIMES A DAY, AS INSTRUCTED. 200 Pen Needle 2    oxybutynin chloride XL (DITROPAN XL) 15 mg CR tablet Take 10 mg by mouth daily.  meloxicam (MOBIC) 15 mg tablet Take 15 mg by mouth daily.  azelastine (OPTIVAR) 0.05 % ophthalmic solution       RESTASIS 0.05 % ophthalmic emulsion       glucose blood VI test strips (TRUE METRIX GLUCOSE TEST STRIP) strip by Does Not Apply route See Admin Instructions. Use to check blood glucose 4x daily. Dx code: E11.65      insulin syringe-needle U-100 1 mL 31 gauge x 15/64\" syrg 1 Syringe by Does Not Apply route two (2) times a day. Dx code: E11.65 100 Pen Needle 11    furosemide (LASIX) 40 mg tablet Take 20 mg by mouth daily.  atorvastatin (LIPITOR) 40 mg tablet Take 40 mg by mouth nightly.  lisinopril (PRINIVIL, ZESTRIL) 5 mg tablet Take 5 mg by mouth daily.  levETIRAcetam (KEPPRA) 500 mg tablet Take  by mouth two (2) times a day.  aspirin delayed-release 81 mg tablet Take  by mouth daily.  potassium chloride (KAON 10%) 20 mEq/15 mL solution Take  by mouth daily. PILL         Past History     Past Medical History:  Past Medical History:   Diagnosis Date    Arthritis     [facility]     Diabetes ([facility]     Hyperlipidemia     Hypertension     Seizure ([facility]        Past Surgical History:  Past Surgical History:   Procedure Laterality Date    HX CHOLECYSTECTOMY  2014       Family History:  Family History   Problem Relation Age of Onset    Cancer Mother         stomach cancer    Diabetes Mother     Hypertension Mother     Diabetes Sister     Hypertension Sister        Social History:  Social History     Tobacco Use    Smoking status: Former Smoker     Types: Cigarettes    Smokeless tobacco: Never Used   Substance Use Topics    Alcohol use: No    Drug use: No       Allergies:   Allergies   Allergen Reactions    Oxycodone-Acetaminophen Unknown (comments)    Vicodin [Hydrocodone-Acetaminophen] Unknown (comments)         Review of Systems     Review of Systems   Constitutional: Negative for fatigue and fever. HENT: Negative. Eyes: Negative. Negative for visual disturbance. Respiratory: Negative for shortness of breath and wheezing. Cardiovascular: Negative for chest pain and leg swelling. Gastrointestinal: Negative for abdominal pain, blood in stool, constipation, diarrhea, nausea and vomiting. Endocrine: Negative. Genitourinary: Negative for difficulty urinating and dysuria. Musculoskeletal: Negative. Skin: Negative for rash. Allergic/Immunologic: Negative. Neurological: Positive for seizures, numbness and headaches. Negative for facial asymmetry, speech difficulty and weakness. Hematological: Negative. Psychiatric/Behavioral: Negative. Physical Exam     Physical Exam  Constitutional:       Appearance: She is well-developed. HENT:      Head: Normocephalic and atraumatic. Eyes:      Pupils: Pupils are equal, round, and reactive to light. Neck:      Musculoskeletal: Normal range of motion. Vascular: No JVD. Trachea: No tracheal deviation. Cardiovascular:      Rate and Rhythm: Normal rate and regular rhythm. Heart sounds: Normal heart sounds. No murmur. No friction rub. No gallop. Pulmonary:      Effort: Pulmonary effort is normal.      Breath sounds: Normal breath sounds. No stridor. No wheezing or rales. Abdominal:      General: Bowel sounds are normal. There is no distension. Palpations: Abdomen is soft. There is no mass. Tenderness: There is no abdominal tenderness. There is no guarding. Musculoskeletal: Normal range of motion. General: No tenderness. Skin:     General: Skin is warm and dry. Findings: No rash. Neurological:      General: No focal deficit present. Mental Status: She is alert and oriented to person, place, and time. Comments: 5 out of 5 muscle strength in her bilateral upper and lower extremities.   Mildly decreased sensation in her right lower extremity which is baseline per patient. No facial droop, no slurred speech, no pronator drift. Psychiatric:         Behavior: Behavior normal.         Thought Content: Thought content normal.         Judgment: Judgment normal.         Lab and Diagnostic Study Results     Labs -     Recent Results (from the past 12 hour(s))   METABOLIC PANEL, COMPREHENSIVE    Collection Time: 12/12/20 11:45 AM   Result Value Ref Range    Sodium 137 136 - 145 mmol/L    Potassium 3.7 3.5 - 5.1 mmol/L    Chloride 106 97 - 108 mmol/L    CO2 26 21 - 32 mmol/L    Anion gap 5 5 - 15 mmol/L    Glucose 333 (H) 65 - 100 mg/dL    BUN 9 6 - 20 mg/dL    Creatinine 0.75 0.55 - 1.02 mg/dL    BUN/Creatinine ratio 12 12 - 20      GFR est AA >60 >60 ml/min/1.73m2    GFR est non-AA >60 >60 ml/min/1.73m2    Calcium 8.5 8.5 - 10.1 mg/dL    Bilirubin, total 0.7 0.2 - 1.0 mg/dL    AST (SGOT) 22 15 - 37 U/L    ALT (SGPT) 27 12 - 78 U/L    Alk. phosphatase 82 45 - 117 U/L    Protein, total 6.3 (L) 6.4 - 8.2 g/dL    Albumin 3.2 (L) 3.5 - 5.0 g/dL    Globulin 3.1 2.0 - 4.0 g/dL    A-G Ratio 1.0 (L) 1.1 - 2.2     CBC WITH AUTOMATED DIFF    Collection Time: 12/12/20 11:45 AM   Result Value Ref Range    WBC 3.8 3.6 - 11.0 K/uL    RBC 3.66 (L) 3.80 - 5.20 M/uL    HGB 11.6 11.5 - 16.0 g/dL    HCT 35.2 35.0 - 47.0 %    MCV 96.2 80.0 - 99.0 FL    MCH 31.7 26.0 - 34.0 PG    MCHC 33.0 30.0 - 36.5 g/dL    RDW 12.0 11.5 - 14.5 %    PLATELET 733 (L) 487 - 400 K/uL    MPV 11.9 8.9 - 12.9 FL    NEUTROPHILS 59 32 - 75 %    LYMPHOCYTES 31 12 - 49 %    MONOCYTES 10 5 - 13 %    EOSINOPHILS 0 0 - 7 %    BASOPHILS 0 0 - 1 %    IMMATURE GRANULOCYTES 0 0.0 - 0.5 %    ABS. NEUTROPHILS 2.3 1.8 - 8.0 K/UL    ABS. LYMPHOCYTES 1.2 0.8 - 3.5 K/UL    ABS. MONOCYTES 0.4 0.0 - 1.0 K/UL    ABS. EOSINOPHILS 0.0 0.0 - 0.4 K/UL    ABS. BASOPHILS 0.0 0.0 - 0.1 K/UL    ABS. IMM.  GRANS. 0.0 0.00 - 0.04 K/UL    DF AUTOMATED     LACTIC ACID    Collection Time: 12/12/20 11:45 AM   Result Value Ref Range    Lactic acid 1.9 0.4 - 2.0 mmol/L   URINALYSIS W/MICROSCOPIC    Collection Time: 12/12/20  3:00 PM   Result Value Ref Range    Color Yellow/Straw      Appearance Clear Clear      Specific gravity 1.006 1.003 - 1.030      pH (UA) 5.0 5.0 - 8.0      Protein Negative Negative mg/dL    Glucose 150 (A) Negative mg/dL    Ketone Negative Negative mg/dL    Bilirubin Negative Negative      Blood Negative Negative      Urobilinogen 0.1 0.1 - 1.0 EU/dL    Nitrites Negative Negative      Leukocyte Esterase Small (A) Negative      WBC 0-4 0 - 4 /hpf    RBC 0-5 0 - 5 /hpf    Bacteria Negative Negative /hpf       Radiologic Studies -   @lastxrresult@  CT Results  (Last 48 hours)               12/12/20 1209  CT HEAD WO CONT Final result    Impression:  IMPRESSION: No acute intracranial process. Narrative:  Dose Reduction:    All CT scans at this facility are performed using dose reduction optimization   techniques as appropriate to a performed exam including the following: Automated   exposure control, adjustments of the mA and/or kV according to patient size, or   use of iterative reconstruction technique. FINDINGS: Unenhanced images were obtained. Comparison with 8/4/2019. Normal   gray-white differentiation. Normal ventricles. No evidence of brain mass,   hemorrhage, or acute large vessel distribution infarct. Tiny calcification, left   basal ganglia, stable. Normal appearance of craniovertebral junction and corpus   callosum. Empty sella degenerative changes, C1-C2 left maxillary mucosal   thickening. Right maxillary mucous retention cyst. No acute calvarial fracture. CXR Results  (Last 48 hours)    None            Medical Decision Making   - I am the first provider for this patient. - I reviewed the vital signs, available nursing notes, past medical history, past surgical history, family history and social history. - Initial assessment performed.  The patients presenting problems have been discussed, and they are in agreement with the care plan formulated and outlined with them. I have encouraged them to ask questions as they arise throughout their visit. Vital Signs-Reviewed the patient's vital signs. Patient Vitals for the past 12 hrs:   Temp Pulse Resp BP SpO2   12/12/20 1531  61 16 124/71 98 %   12/12/20 1442  62 16 133/61 96 %   12/12/20 1140 98.1 °F (36.7 °C) 68 16 (!) 116/49 95 %       Records Reviewed: Nursing Notes    EKG interpreted by me. Shows a normal sinus rhythm with a heart rate of 71. No ST elevations or depressions concerning for ischemia. Normal intervals. ED Course:     ED Course as of Dec 12 1538   Sat Dec 12, 2020   1454 Updated patient and family on lab and imaging results. They will follow up with neurology and endocrinology. [CK]      ED Course User Index  [CK] Sreedhar Vazquez DO       Provider Notes (Medical Decision Making):     MDM  Number of Diagnoses or Management Options  Seizure [facility]):   Diagnosis management comments: Patient presenting status post seizure, is now back in mental baseline. Patient with no focal neurologic deficits on exam-low suspicion for acute intracranial process. Will get head CT, labs, UA to evaluate for infection, metabolic abnormality, CVA           Disposition   Disposition: DC- Adult Discharges: All of the diagnostic tests were reviewed and questions answered. Diagnosis, care plan and treatment options were discussed. The patient understands the instructions and will follow up as directed. The patients results have been reviewed with them. They have been counseled regarding their diagnosis. The patient and family member patient and son verbally convey understanding and agreement of the signs, symptoms, diagnosis, treatment and prognosis and additionally agrees to follow up as recommended with their PCP in 24 - 48 hours. They also agree with the care-plan and convey that all of their questions have been answered.   I have also put together some discharge instructions for them that include: 1) educational information regarding their diagnosis, 2) how to care for their diagnosis at home, as well a 3) list of reasons why they would want to return to the ED prior to their follow-up appointment, should their condition change. Discharged    DISCHARGE PLAN:  1. Cannot display discharge medications since this patient is not currently admitted. 2.   Follow-up Information     Follow up With Specialties Details Why Contact Info    Mari Bauer MD [facility] Medicine Schedule an appointment as soon as possible for a visit in 2 days  [address] [place] [address]  [phone]      Your neurologist  Schedule an appointment as soon as possible for a visit in 2 days      Your endocrinologist  Schedule an appointment as soon as possible for a visit in 2 days          3. Return to ED if worse   4. Discharge Medication List as of 12/12/2020  3:31 PM            Diagnosis     Clinical Impression:   1. Seizure [facility])        Attestations:    Kimmie Gray, DO    Please note that this dictation was completed with docBeat, the computer voice recognition software. Quite often unanticipated grammatical, syntax, homophones, and other interpretive errors are inadvertently transcribed by the computer software. Please disregard these errors. Please excuse any errors that have escaped final proofreading. Thank you.

## 2020-12-13 LAB
ATRIAL RATE: 71 BPM
CALCULATED R AXIS, ECG10: -3 DEGREES
CALCULATED T AXIS, ECG11: 8 DEGREES
DIAGNOSIS, 93000: NORMAL
P-R INTERVAL, ECG05: 248 MS
Q-T INTERVAL, ECG07: 394 MS
QRS DURATION, ECG06: 78 MS
QTC CALCULATION (BEZET), ECG08: 428 MS
VENTRICULAR RATE, ECG03: 71 BPM

## 2021-05-21 ENCOUNTER — HOSPITAL ENCOUNTER (EMERGENCY)
Age: 71
Discharge: HOME | End: 2021-05-22
Attending: EMERGENCY MEDICINE | Admitting: EMERGENCY MEDICINE
Payer: MEDICARE

## 2021-05-21 ENCOUNTER — APPOINTMENT (OUTPATIENT)
Dept: CT IMAGING | Age: 71
End: 2021-05-21
Attending: EMERGENCY MEDICINE
Payer: MEDICARE

## 2021-05-21 VITALS
BODY MASS INDEX: 37.17 KG/M2 | HEART RATE: 69 BPM | OXYGEN SATURATION: 95 % | SYSTOLIC BLOOD PRESSURE: 111 MMHG | RESPIRATION RATE: 18 BRPM | DIASTOLIC BLOOD PRESSURE: 58 MMHG | HEIGHT: 62 IN | WEIGHT: 202 LBS | TEMPERATURE: 97.4 F

## 2021-05-21 DIAGNOSIS — R19.7 DIARRHEA, UNSPECIFIED TYPE: ICD-10-CM

## 2021-05-21 DIAGNOSIS — G40.909 SEIZURE DISORDER (HCC): Primary | ICD-10-CM

## 2021-05-21 PROCEDURE — 70450 CT HEAD/BRAIN W/O DYE: CPT

## 2021-05-21 PROCEDURE — 74011250636 HC RX REV CODE- 250/636: Performed by: EMERGENCY MEDICINE

## 2021-05-21 PROCEDURE — 99284 EMERGENCY DEPT VISIT MOD MDM: CPT

## 2021-05-21 PROCEDURE — 85025 COMPLETE CBC W/AUTO DIFF WBC: CPT

## 2021-05-21 PROCEDURE — 36415 COLL VENOUS BLD VENIPUNCTURE: CPT

## 2021-05-21 PROCEDURE — 80053 COMPREHEN METABOLIC PANEL: CPT

## 2021-05-21 PROCEDURE — 93005 ELECTROCARDIOGRAM TRACING: CPT

## 2021-05-21 PROCEDURE — 96374 THER/PROPH/DIAG INJ IV PUSH: CPT

## 2021-05-21 RX ORDER — LORAZEPAM 2 MG/ML
1 INJECTION INTRAMUSCULAR
Status: DISCONTINUED | OUTPATIENT
Start: 2021-05-21 | End: 2021-05-22 | Stop reason: HOSPADM

## 2021-05-21 RX ORDER — LEVETIRACETAM 10 MG/ML
1000 INJECTION INTRAVASCULAR ONCE
Status: COMPLETED | OUTPATIENT
Start: 2021-05-21 | End: 2021-05-21

## 2021-05-21 RX ADMIN — LEVETIRACETAM 1000 MG: 10 INJECTION INTRAVENOUS at 22:50

## 2021-05-21 RX ADMIN — SODIUM CHLORIDE 1000 ML: 9 INJECTION, SOLUTION INTRAVENOUS at 22:52

## 2021-05-22 LAB
ALBUMIN SERPL-MCNC: 3.3 G/DL (ref 3.5–5)
ALBUMIN/GLOB SERPL: 0.9 {RATIO} (ref 1.1–2.2)
ALP SERPL-CCNC: 67 U/L (ref 45–117)
ALT SERPL-CCNC: 26 U/L (ref 12–78)
ANION GAP SERPL CALC-SCNC: 5 MMOL/L (ref 5–15)
AST SERPL W P-5'-P-CCNC: 21 U/L (ref 15–37)
ATRIAL RATE: 69 BPM
BASOPHILS # BLD: 0 K/UL (ref 0–0.1)
BASOPHILS NFR BLD: 0 % (ref 0–1)
BILIRUB SERPL-MCNC: 0.8 MG/DL (ref 0.2–1)
BUN SERPL-MCNC: 9 MG/DL (ref 6–20)
BUN/CREAT SERPL: 15 (ref 12–20)
CA-I BLD-MCNC: 8.9 MG/DL (ref 8.5–10.1)
CALCULATED P AXIS, ECG09: 55 DEGREES
CALCULATED R AXIS, ECG10: 5 DEGREES
CALCULATED T AXIS, ECG11: 33 DEGREES
CHLORIDE SERPL-SCNC: 105 MMOL/L (ref 97–108)
CO2 SERPL-SCNC: 25 MMOL/L (ref 21–32)
CREAT SERPL-MCNC: 0.61 MG/DL (ref 0.55–1.02)
DIAGNOSIS, 93000: NORMAL
DIFFERENTIAL METHOD BLD: ABNORMAL
EOSINOPHIL # BLD: 0.1 K/UL (ref 0–0.4)
EOSINOPHIL NFR BLD: 2 % (ref 0–7)
ERYTHROCYTE [DISTWIDTH] IN BLOOD BY AUTOMATED COUNT: 11.9 % (ref 11.5–14.5)
GLOBULIN SER CALC-MCNC: 3.5 G/DL (ref 2–4)
GLUCOSE SERPL-MCNC: 136 MG/DL (ref 65–100)
HCT VFR BLD AUTO: 35.9 % (ref 35–47)
HGB BLD-MCNC: 11.9 G/DL (ref 11.5–16)
IMM GRANULOCYTES # BLD AUTO: 0 K/UL (ref 0–0.04)
IMM GRANULOCYTES NFR BLD AUTO: 0 % (ref 0–0.5)
LYMPHOCYTES # BLD: 1.5 K/UL (ref 0.8–3.5)
LYMPHOCYTES NFR BLD: 21 % (ref 12–49)
MCH RBC QN AUTO: 32 PG (ref 26–34)
MCHC RBC AUTO-ENTMCNC: 33.1 G/DL (ref 30–36.5)
MCV RBC AUTO: 96.5 FL (ref 80–99)
MONOCYTES # BLD: 0.7 K/UL (ref 0–1)
MONOCYTES NFR BLD: 9 % (ref 5–13)
NEUTS SEG # BLD: 4.7 K/UL (ref 1.8–8)
NEUTS SEG NFR BLD: 68 % (ref 32–75)
NRBC # BLD: 0 K/UL (ref 0–0.01)
NRBC BLD-RTO: 0 PER 100 WBC
P-R INTERVAL, ECG05: 160 MS
PLATELET # BLD AUTO: 173 K/UL (ref 150–400)
PMV BLD AUTO: 11.3 FL (ref 8.9–12.9)
POTASSIUM SERPL-SCNC: 4 MMOL/L (ref 3.5–5.1)
PROT SERPL-MCNC: 6.8 G/DL (ref 6.4–8.2)
Q-T INTERVAL, ECG07: 420 MS
QRS DURATION, ECG06: 80 MS
QTC CALCULATION (BEZET), ECG08: 450 MS
RBC # BLD AUTO: 3.72 M/UL (ref 3.8–5.2)
SODIUM SERPL-SCNC: 135 MMOL/L (ref 136–145)
VENTRICULAR RATE, ECG03: 69 BPM
WBC # BLD AUTO: 6.9 K/UL (ref 3.6–11)

## 2021-05-22 NOTE — ED PROVIDER NOTES
EMERGENCY DEPARTMENT HISTORY AND PHYSICAL EXAM      Date: 5/21/2021  Patient Name: Nellene Romberg      History of Presenting Illness     Chief Complaint   Patient presents with    Seizure    Diarrhea       History Provided By: Patient    HPI: Nellene Romberg, 72 y.o. female with a past medical history significant for seizure, diabetes, hypertension, who presented to our emergency department with a chief complaint of 2 or 3 seizures that happened tonight. According to the EMS, patient has a history of seizure. She is on Keppra. She recently has developed diarrhea which is over the past couple of days. Tonight patient had couple of seizures. EMS gave patient intranasal Versed. And transfer the patient to emergency department. Upon arrival patient is still in the post ictal state. She is confused. She denies any fever, chest pain, shortness of breath or any injury. No other complaints. There are no other complaints, changes, or physical findings at this time. PCP: Saad Cuba MD    Current Facility-Administered Medications   Medication Dose Route Frequency Provider Last Rate Last Admin    LORazepam (ATIVAN) injection 1 mg  1 mg IntraVENous NOW Fátima Lopez DO         Current Outpatient Medications   Medication Sig Dispense Refill    pioglitazone (ACTOS) 30 mg tablet TAKE 1 TABLET BY MOUTH EVERY DAY 30 Tab 0    metFORMIN ER (GLUCOPHAGE XR) 750 mg tablet TAKE 1 TABLET BY MOUTH TWICE A  Tab 1    insulin glargine (LANTUS SOLOSTAR U-100 INSULIN) 100 unit/mL (3 mL) inpn Inject 28 units in the morning.  FAITH PEN NEEDLE 32 gauge x 5/32\" ndle USE 4 TIMES A DAY, AS INSTRUCTED. 200 Pen Needle 2    oxybutynin chloride XL (DITROPAN XL) 15 mg CR tablet Take 10 mg by mouth daily.  meloxicam (MOBIC) 15 mg tablet Take 15 mg by mouth daily.       azelastine (OPTIVAR) 0.05 % ophthalmic solution       RESTASIS 0.05 % ophthalmic emulsion       glucose blood VI test strips (TRUE METRIX GLUCOSE TEST STRIP) strip by Does Not Apply route See Admin Instructions. Use to check blood glucose 4x daily. Dx code: E11.65      insulin syringe-needle U-100 1 mL 31 gauge x 15/64\" syrg 1 Syringe by Does Not Apply route two (2) times a day. Dx code: E11.65 100 Pen Needle 11    furosemide (LASIX) 40 mg tablet Take 20 mg by mouth daily.  atorvastatin (LIPITOR) 40 mg tablet Take 40 mg by mouth nightly.  lisinopril (PRINIVIL, ZESTRIL) 5 mg tablet Take 5 mg by mouth daily.  levETIRAcetam (KEPPRA) 500 mg tablet Take  by mouth two (2) times a day.  aspirin delayed-release 81 mg tablet Take  by mouth daily.  potassium chloride (KAON 10%) 20 mEq/15 mL solution Take  by mouth daily. PILL         Past History     Past Medical History:  Past Medical History:   Diagnosis Date    Arthritis     [facility] [address]     Diabetes ([facility]     Hyperlipidemia     Hypertension     [facility]        Past Surgical History:  Past Surgical History:   Procedure Laterality Date    HX CHOLECYSTECTOMY  2014       Family History:  Family History   Problem Relation Age of Onset    Cancer Mother         stomach cancer    Diabetes Mother     Hypertension Mother     Diabetes Sister     Hypertension Sister        Social History:  Social History     Tobacco Use    Smoking status: Former Smoker     Types: Cigarettes    Smokeless tobacco: Never Used   Substance Use Topics    Alcohol use: No    Drug use: No       Allergies: Allergies   Allergen Reactions    Oxycodone-Acetaminophen Unknown (comments)    Vicodin [Hydrocodone-Acetaminophen] Unknown (comments)         Review of Systems     Review of Systems   Constitutional: Negative. HENT: Negative. Eyes: Negative. Respiratory: Negative. Cardiovascular: Negative. Gastrointestinal: Negative. Endocrine: Negative. Genitourinary: Negative. Musculoskeletal: Negative. Skin: Negative. Neurological: Positive for seizures. Psychiatric/Behavioral: Negative. All other systems reviewed and are negative. Physical Exam     Physical Exam  Vitals and nursing note reviewed. Constitutional:       General: She is in acute distress. Appearance: Normal appearance. She is normal weight. She is not ill-appearing or diaphoretic. HENT:      Head: Normocephalic. Right Ear: External ear normal.      Left Ear: External ear normal.      Nose: Nose normal. No congestion or rhinorrhea. Mouth/Throat:      Pharynx: Oropharynx is clear. No oropharyngeal exudate or posterior oropharyngeal erythema. Eyes:      General: No scleral icterus. Right eye: No discharge. Left eye: No discharge. Extraocular Movements: Extraocular movements intact. Conjunctiva/sclera: Conjunctivae normal.      Pupils: Pupils are equal, round, and reactive to light. Cardiovascular:      Rate and Rhythm: Normal rate and regular rhythm. Pulses: Normal pulses. Heart sounds: Normal heart sounds. No murmur heard. Pulmonary:      Effort: Pulmonary effort is normal. No respiratory distress. Breath sounds: Normal breath sounds. No stridor. No wheezing, rhonchi or rales. Chest:      Chest wall: No tenderness. Abdominal:      General: Abdomen is flat. Bowel sounds are normal. There is no distension. Palpations: Abdomen is soft. Tenderness: There is no abdominal tenderness. There is no right CVA tenderness, left CVA tenderness, guarding or rebound. Musculoskeletal:         General: No swelling, tenderness, deformity or signs of injury. Normal range of motion. Cervical back: Normal range of motion and neck supple. No rigidity. No muscular tenderness. Right lower leg: No edema. Left lower leg: No edema. Skin:     General: Skin is warm. Capillary Refill: Capillary refill takes less than 2 seconds. Coloration: Skin is not jaundiced or pale.       Findings: No bruising, erythema, lesion or rash. Neurological:      General: No focal deficit present. Mental Status: She is alert. Mental status is at baseline. She is disoriented. Cranial Nerves: No cranial nerve deficit. Sensory: No sensory deficit. Motor: No weakness. Coordination: Coordination normal.      Comments: In the post ictal state at this time. Psychiatric:         Mood and Affect: Mood normal.         Behavior: Behavior normal.         Lab and Diagnostic Study Results     Labs -     Recent Results (from the past 12 hour(s))   CBC WITH AUTOMATED DIFF    Collection Time: 05/21/21 11:06 PM   Result Value Ref Range    WBC 6.9 3.6 - 11.0 K/uL    RBC 3.72 (L) 3.80 - 5.20 M/uL    HGB 11.9 11.5 - 16.0 g/dL    HCT 35.9 35.0 - 47.0 %    MCV 96.5 80.0 - 99.0 FL    MCH 32.0 26.0 - 34.0 PG    MCHC 33.1 30.0 - 36.5 g/dL    RDW 11.9 11.5 - 14.5 %    PLATELET 840 782 - 335 K/uL    MPV 11.3 8.9 - 12.9 FL    NRBC 0.0 0.0  WBC    ABSOLUTE NRBC 0.00 0.00 - 0.01 K/uL    NEUTROPHILS 68 32 - 75 %    LYMPHOCYTES 21 12 - 49 %    MONOCYTES 9 5 - 13 %    EOSINOPHILS 2 0 - 7 %    BASOPHILS 0 0 - 1 %    IMMATURE GRANULOCYTES 0 0 - 0.5 %    ABS. NEUTROPHILS 4.7 1.8 - 8.0 K/UL    ABS. LYMPHOCYTES 1.5 0.8 - 3.5 K/UL    ABS. MONOCYTES 0.7 0.0 - 1.0 K/UL    ABS. EOSINOPHILS 0.1 0.0 - 0.4 K/UL    ABS. BASOPHILS 0.0 0.0 - 0.1 K/UL    ABS. IMM.  GRANS. 0.0 0.00 - 0.04 K/UL    DF AUTOMATED     METABOLIC PANEL, COMPREHENSIVE    Collection Time: 05/21/21 11:06 PM   Result Value Ref Range    Sodium 135 (L) 136 - 145 mmol/L    Potassium 4.0 3.5 - 5.1 mmol/L    Chloride 105 97 - 108 mmol/L    CO2 25 21 - 32 mmol/L    Anion gap 5 5 - 15 mmol/L    Glucose 136 (H) 65 - 100 mg/dL    BUN 9 6 - 20 mg/dL    Creatinine 0.61 0.55 - 1.02 mg/dL    BUN/Creatinine ratio 15 12 - 20      GFR est AA >60 >60 ml/min/1.73m2    GFR est non-AA >60 >60 ml/min/1.73m2    Calcium 8.9 8.5 - 10.1 mg/dL    Bilirubin, total 0.8 0.2 - 1.0 mg/dL    AST (SGOT) 21 15 - 37 U/L ALT (SGPT) 26 12 - 78 U/L    Alk. phosphatase 67 45 - 117 U/L    Protein, total 6.8 6.4 - 8.2 g/dL    Albumin 3.3 (L) 3.5 - 5.0 g/dL    Globulin 3.5 2.0 - 4.0 g/dL    A-G Ratio 0.9 (L) 1.1 - 2.2         Radiologic Studies -   [unfilled]  CT Results  (Last 48 hours)               05/21/21 2325  CT HEAD WO CONT Final result    Impression:  No evidence of acute intracranial process. Narrative:  CT HEAD WITHOUT IV CONTRAST       CLINICAL INDICATION: Seizure       TECHNIQUE: Routine axial images were obtained through the brain without the use   of IV contrast. Sagittal and coronal reformatted images were performed at the CT   console. Dose reduction: Per department policy, all CT scans at this facility   are performed using dose reduction optimization techniques as appropriate to a   performed examination including the following: Automated exposure control,   adjustments of the mA and/or KV according to patient size, or use of iterative   reconstruction technique. COMPARISON: 12/12/2020       FINDINGS:        No evidence of acute intracranial hemorrhage or mass effect. Brain parenchymal attenuation is unremarkable for patient age. Ventricles and extra-axial spaces are normal in size and configuration for age. Portions of the posterior fossa not obscured by streak artifact are   unremarkable. Absent bilateral native lenses. Dependent fluid of the right maxillary sinus. Remaining paranasal sinuses and   tympanomastoid cavities are predominantly clear. Tympanomastoid cavities are clear. No acute calvarial abnormality. Visualized major intracranial vasculature is unremarkable in noncontrast CT   appearance. CXR Results  (Last 48 hours)    None          Medical Decision Making and ED Course   - I am the first and primary provider for this patient AND AM THE PRIMARY PROVIDER OF RECORD.     - I reviewed the vital signs, available nursing notes, past medical history, past surgical history, family history and social history. - Initial assessment performed. The patients presenting problems have been discussed, and the staff are in agreement with the care plan formulated and outlined with them. I have encouraged them to ask questions as they arise throughout their visit. Vital Signs-Reviewed the patient's vital signs. Patient Vitals for the past 12 hrs:   Temp Pulse Resp BP SpO2   05/21/21 2203 97.4 °F (36.3 °C) 69 18 (!) 111/58 95 %       EKG was done at 10:36 PM.  Normal sinus rhythm. Rate of 69. Normal axis. No acute ST-T elevation. No STEMI. No old EKG available for comparison at this time. Records Reviewed: Nursing Notes        ED Course:              Provider Notes (Medical Decision Making):     MDM  Number of Diagnoses or Management Options  Diarrhea, unspecified type: new, needed workup  Seizure disorder ([facility] [address]): established, worsening  Diagnosis management comments: Differential diagnosis includes seizure, seizure disorder, pseudoseizure, petit seizure, grand mal seizure, hyponatremia, diarrhea. Amount and/or Complexity of Data Reviewed  Clinical lab tests: ordered and reviewed  Tests in the radiology section of CPT®: ordered and reviewed  Tests in the medicine section of CPT®: reviewed and ordered  Independent visualization of images, tracings, or specimens: yes (EKG was done at 10:36 PM.  Normal sinus rhythm. Rate of 69. Normal axis. No acute ST-T elevation. No STEMI. No old EKG available for comparison at this time.)    Risk of Complications, Morbidity, and/or Mortality  Presenting problems: high  Diagnostic procedures: high  Management options: high  General comments: Patient remained stable throughout the course of treatment. Labs are essentially unremarkable. Patient was given IV fluid, Keppra IV, Ativan IV with good results. CAT scan was negative. Will discharge patient home to follow-up with her PCP. Patient does have A ride home.   Patient understood and agree with her management. Patient Progress  Patient progress: stable             Consultations:       Consultations:         Procedures and Critical Care       Performed by: Vinie Severance, DO  PROCEDURES:  Procedures               CRITICAL CARE NOTE :  9:59 PM  Amount of Critical Care Time: (minutes)    IMPENDING DETERIORATION -  ASSOCIATED RISK FACTORS -   MANAGEMENT-   INTERPRETATION -    INTERVENTIONS -   CASE REVIEW -   TREATMENT RESPONSE -  PERFORMED BY -     NOTES   :  I have spent critical care time involved in lab review, consultations with specialist, family decision- making, bedside attention and documentation. This time excludes time spent in any separate billed procedures. During this entire length of time I was immediately available to the patient . Vinie Severance, DO        Disposition     Disposition: Condition stable and improved        Remove if not discharged  DISCHARGE PLAN:  1. Cannot display discharge medications since this patient is expected but has not yet arrived. 2.   Follow-up Information     Follow up With Specialties Details Why Contact Info    Jayde Mg MD [facility] Medicine Schedule an appointment as soon as possible for a visit in 1 day  [address] [place] [address]  [phone]          3. Return to ED if worse   4. Current Discharge Medication List          Diagnosis     Clinical Impression:   1. Seizure disorder ([address]    2. Diarrhea, unspecified type        Attestations:    Vinie Severance, DO    Please note that this dictation was completed with [employer], the computer voice recognition software. Quite often unanticipated grammatical, syntax, homophones, and other interpretive errors are inadvertently transcribed by the computer software. Please disregard these errors. Please excuse any errors that have escaped final proofreading. Thank you.

## 2021-05-22 NOTE — ED TRIAGE NOTES
Called for seizure activity. Had 2 with ems. 5mg versed given intranasally. 18gauge in left hand. Also complaining of diarrhea for a few days. Drowsy at this time.

## 2021-08-20 ENCOUNTER — APPOINTMENT (OUTPATIENT)
Dept: CT IMAGING | Age: 71
End: 2021-08-20
Attending: STUDENT IN AN ORGANIZED HEALTH CARE EDUCATION/TRAINING PROGRAM
Payer: MEDICARE

## 2021-08-20 ENCOUNTER — APPOINTMENT (OUTPATIENT)
Dept: GENERAL RADIOLOGY | Age: 71
End: 2021-08-20
Attending: STUDENT IN AN ORGANIZED HEALTH CARE EDUCATION/TRAINING PROGRAM
Payer: MEDICARE

## 2021-08-20 ENCOUNTER — HOSPITAL ENCOUNTER (OUTPATIENT)
Age: 71
Setting detail: OBSERVATION
Discharge: HOME | End: 2021-08-21
Attending: STUDENT IN AN ORGANIZED HEALTH CARE EDUCATION/TRAINING PROGRAM | Admitting: INTERNAL MEDICINE
Payer: MEDICARE

## 2021-08-20 DIAGNOSIS — R55 SYNCOPE AND COLLAPSE: Primary | ICD-10-CM

## 2021-08-20 PROBLEM — G40.909 SEIZURE DISORDER (HCC): Status: ACTIVE | Noted: 2021-08-20

## 2021-08-20 LAB
ABO + RH BLD: NORMAL
ALBUMIN SERPL-MCNC: 3.9 G/DL (ref 3.5–5)
ALBUMIN/GLOB SERPL: 1 {RATIO} (ref 1.1–2.2)
ALP SERPL-CCNC: 73 U/L (ref 45–117)
ALT SERPL-CCNC: 34 U/L (ref 12–78)
AMPHET UR QL SCN: NEGATIVE
ANION GAP SERPL CALC-SCNC: 10 MMOL/L (ref 5–15)
APPEARANCE UR: CLEAR
APTT PPP: 29.4 SEC (ref 21.2–34.1)
AST SERPL W P-5'-P-CCNC: 19 U/L (ref 15–37)
ATRIAL RATE: 71 BPM
BACTERIA URNS QL MICRO: NEGATIVE /HPF
BARBITURATES UR QL SCN: NEGATIVE
BASE EXCESS BLDV CALC-SCNC: 2.4 MMOL/L (ref 0–2)
BDY SITE: ABNORMAL
BENZODIAZ UR QL: NEGATIVE
BILIRUB SERPL-MCNC: 0.6 MG/DL (ref 0.2–1)
BILIRUB UR QL: NEGATIVE
BLOOD GROUP ANTIBODIES SERPL: NEGATIVE
BUN SERPL-MCNC: 9 MG/DL (ref 6–20)
BUN/CREAT SERPL: 10 (ref 12–20)
CA-I BLD-MCNC: 9.2 MG/DL (ref 8.5–10.1)
CALCULATED P AXIS, ECG09: 29 DEGREES
CALCULATED R AXIS, ECG10: 13 DEGREES
CALCULATED T AXIS, ECG11: 29 DEGREES
CANNABINOIDS UR QL SCN: NEGATIVE
CHLORIDE SERPL-SCNC: 99 MMOL/L (ref 97–108)
CO2 SERPL-SCNC: 25 MMOL/L (ref 21–32)
COCAINE UR QL SCN: NEGATIVE
COLOR UR: NORMAL
COVID-19 RAPID TEST, COVR: NOT DETECTED
CREAT SERPL-MCNC: 0.88 MG/DL (ref 0.55–1.02)
D DIMER PPP FEU-MCNC: <0.27 UG/ML(FEU)
DIAGNOSIS, 93000: NORMAL
DRUG SCRN COMMENT,DRGCM: NORMAL
ERYTHROCYTE [DISTWIDTH] IN BLOOD BY AUTOMATED COUNT: 11.8 % (ref 11.5–14.5)
ETHANOL SERPL-MCNC: <4 MG/DL
GLOBULIN SER CALC-MCNC: 3.8 G/DL (ref 2–4)
GLUCOSE SERPL-MCNC: 237 MG/DL (ref 65–100)
GLUCOSE UR STRIP.AUTO-MCNC: NEGATIVE MG/DL
HCO3 BLDV-SCNC: 27 MMOL/L (ref 22–26)
HCT VFR BLD AUTO: 35.4 % (ref 35–47)
HGB BLD-MCNC: 12.1 G/DL (ref 11.5–16)
HGB UR QL STRIP: NEGATIVE
INR PPP: 1 (ref 0.9–1.1)
KETONES UR QL STRIP.AUTO: NEGATIVE MG/DL
LACTATE SERPL-SCNC: 2.2 MMOL/L (ref 0.4–2)
LEUKOCYTE ESTERASE UR QL STRIP.AUTO: NEGATIVE
LIPASE SERPL-CCNC: 102 U/L (ref 73–393)
MCH RBC QN AUTO: 32.8 PG (ref 26–34)
MCHC RBC AUTO-ENTMCNC: 34.2 G/DL (ref 30–36.5)
MCV RBC AUTO: 95.9 FL (ref 80–99)
METHADONE UR QL: NEGATIVE
NITRITE UR QL STRIP.AUTO: NEGATIVE
NRBC # BLD: 0 K/UL (ref 0–0.01)
NRBC BLD-RTO: 0 PER 100 WBC
OPIATES UR QL: NEGATIVE
P-R INTERVAL, ECG05: 138 MS
PCO2 BLDV: 43 MMHG (ref 40–50)
PCP UR QL: NEGATIVE
PH BLDV: 7.42 [PH] (ref 7.31–7.41)
PH UR STRIP: 6 [PH] (ref 5–8)
PLATELET # BLD AUTO: 188 K/UL (ref 150–400)
PMV BLD AUTO: 11 FL (ref 8.9–12.9)
PO2 BLDV: 106 MMHG (ref 36–42)
POTASSIUM SERPL-SCNC: 3.8 MMOL/L (ref 3.5–5.1)
PROT SERPL-MCNC: 7.7 G/DL (ref 6.4–8.2)
PROT UR STRIP-MCNC: NEGATIVE MG/DL
PROTHROMBIN TIME: 13.3 SEC (ref 11.9–14.7)
Q-T INTERVAL, ECG07: 378 MS
QRS DURATION, ECG06: 76 MS
QTC CALCULATION (BEZET), ECG08: 410 MS
RBC # BLD AUTO: 3.69 M/UL (ref 3.8–5.2)
RBC #/AREA URNS HPF: NORMAL /HPF (ref 0–5)
SAO2 % BLDV: 99 % (ref 60–80)
SODIUM SERPL-SCNC: 134 MMOL/L (ref 136–145)
SP GR UR REFRACTOMETRY: 1.01 (ref 1–1.03)
SPECIMEN EXP DATE BLD: NORMAL
SPECIMEN SOURCE: NORMAL
THERAPEUTIC RANGE,PTTT: NORMAL SEC (ref 82–109)
TROPONIN I SERPL-MCNC: <0.05 NG/ML
TROPONIN I SERPL-MCNC: <0.05 NG/ML
UROBILINOGEN UR QL STRIP.AUTO: 0.1 EU/DL (ref 0.1–1)
VENTRICULAR RATE, ECG03: 71 BPM
WBC # BLD AUTO: 7 K/UL (ref 3.6–11)
WBC URNS QL MICRO: NORMAL /HPF (ref 0–4)

## 2021-08-20 PROCEDURE — 82077 ASSAY SPEC XCP UR&BREATH IA: CPT

## 2021-08-20 PROCEDURE — 36415 COLL VENOUS BLD VENIPUNCTURE: CPT

## 2021-08-20 PROCEDURE — 83605 ASSAY OF LACTIC ACID: CPT

## 2021-08-20 PROCEDURE — 80307 DRUG TEST PRSMV CHEM ANLYZR: CPT

## 2021-08-20 PROCEDURE — 85610 PROTHROMBIN TIME: CPT

## 2021-08-20 PROCEDURE — 81001 URINALYSIS AUTO W/SCOPE: CPT

## 2021-08-20 PROCEDURE — 82803 BLOOD GASES ANY COMBINATION: CPT

## 2021-08-20 PROCEDURE — 83690 ASSAY OF LIPASE: CPT

## 2021-08-20 PROCEDURE — 85379 FIBRIN DEGRADATION QUANT: CPT

## 2021-08-20 PROCEDURE — 80053 COMPREHEN METABOLIC PANEL: CPT

## 2021-08-20 PROCEDURE — 74011250637 HC RX REV CODE- 250/637: Performed by: INTERNAL MEDICINE

## 2021-08-20 PROCEDURE — 65270000029 HC RM PRIVATE

## 2021-08-20 PROCEDURE — 84484 ASSAY OF TROPONIN QUANT: CPT

## 2021-08-20 PROCEDURE — 85027 COMPLETE CBC AUTOMATED: CPT

## 2021-08-20 PROCEDURE — 80177 DRUG SCRN QUAN LEVETIRACETAM: CPT

## 2021-08-20 PROCEDURE — 93005 ELECTROCARDIOGRAM TRACING: CPT

## 2021-08-20 PROCEDURE — 74011250636 HC RX REV CODE- 250/636: Performed by: STUDENT IN AN ORGANIZED HEALTH CARE EDUCATION/TRAINING PROGRAM

## 2021-08-20 PROCEDURE — 74011250637 HC RX REV CODE- 250/637: Performed by: STUDENT IN AN ORGANIZED HEALTH CARE EDUCATION/TRAINING PROGRAM

## 2021-08-20 PROCEDURE — 74011250636 HC RX REV CODE- 250/636: Performed by: INTERNAL MEDICINE

## 2021-08-20 PROCEDURE — 96372 THER/PROPH/DIAG INJ SC/IM: CPT

## 2021-08-20 PROCEDURE — 70450 CT HEAD/BRAIN W/O DYE: CPT

## 2021-08-20 PROCEDURE — 99218 HC RM OBSERVATION: CPT

## 2021-08-20 PROCEDURE — 83036 HEMOGLOBIN GLYCOSYLATED A1C: CPT

## 2021-08-20 PROCEDURE — 86850 RBC ANTIBODY SCREEN: CPT

## 2021-08-20 PROCEDURE — 85730 THROMBOPLASTIN TIME PARTIAL: CPT

## 2021-08-20 PROCEDURE — 87635 SARS-COV-2 COVID-19 AMP PRB: CPT

## 2021-08-20 PROCEDURE — 71045 X-RAY EXAM CHEST 1 VIEW: CPT

## 2021-08-20 PROCEDURE — 99285 EMERGENCY DEPT VISIT HI MDM: CPT

## 2021-08-20 RX ORDER — ENOXAPARIN SODIUM 100 MG/ML
40 INJECTION SUBCUTANEOUS EVERY 24 HOURS
Status: DISCONTINUED | OUTPATIENT
Start: 2021-08-20 | End: 2021-08-21 | Stop reason: HOSPADM

## 2021-08-20 RX ORDER — OXYBUTYNIN CHLORIDE 5 MG/1
10 TABLET, EXTENDED RELEASE ORAL DAILY
Status: DISCONTINUED | OUTPATIENT
Start: 2021-08-21 | End: 2021-08-21 | Stop reason: HOSPADM

## 2021-08-20 RX ORDER — SODIUM CHLORIDE 0.9 % (FLUSH) 0.9 %
5-40 SYRINGE (ML) INJECTION AS NEEDED
Status: DISCONTINUED | OUTPATIENT
Start: 2021-08-20 | End: 2021-08-21 | Stop reason: HOSPADM

## 2021-08-20 RX ORDER — METFORMIN HYDROCHLORIDE 750 MG/1
750 TABLET, EXTENDED RELEASE ORAL 2 TIMES DAILY
Status: DISCONTINUED | OUTPATIENT
Start: 2021-08-20 | End: 2021-08-21

## 2021-08-20 RX ORDER — LEVETIRACETAM 250 MG/1
750 TABLET ORAL 2 TIMES DAILY
Status: DISCONTINUED | OUTPATIENT
Start: 2021-08-20 | End: 2021-08-20

## 2021-08-20 RX ORDER — LEVETIRACETAM 500 MG/1
1500 TABLET ORAL 2 TIMES DAILY
Status: DISCONTINUED | OUTPATIENT
Start: 2021-08-21 | End: 2021-08-20

## 2021-08-20 RX ORDER — LEVETIRACETAM 500 MG/1
1500 TABLET ORAL 2 TIMES DAILY
Status: DISCONTINUED | OUTPATIENT
Start: 2021-08-20 | End: 2021-08-21 | Stop reason: HOSPADM

## 2021-08-20 RX ORDER — ATORVASTATIN CALCIUM 40 MG/1
40 TABLET, FILM COATED ORAL
Status: DISCONTINUED | OUTPATIENT
Start: 2021-08-20 | End: 2021-08-21 | Stop reason: HOSPADM

## 2021-08-20 RX ORDER — DEXTROSE 50 % IN WATER (D50W) INTRAVENOUS SYRINGE
25-50 AS NEEDED
Status: DISCONTINUED | OUTPATIENT
Start: 2021-08-20 | End: 2021-08-21 | Stop reason: HOSPADM

## 2021-08-20 RX ORDER — MAGNESIUM SULFATE 100 %
4 CRYSTALS MISCELLANEOUS AS NEEDED
Status: DISCONTINUED | OUTPATIENT
Start: 2021-08-20 | End: 2021-08-21 | Stop reason: HOSPADM

## 2021-08-20 RX ORDER — ASPIRIN 81 MG/1
81 TABLET ORAL DAILY
Status: DISCONTINUED | OUTPATIENT
Start: 2021-08-21 | End: 2021-08-21 | Stop reason: HOSPADM

## 2021-08-20 RX ORDER — KETOTIFEN FUMARATE 0.35 MG/ML
1 SOLUTION/ DROPS OPHTHALMIC 2 TIMES DAILY
Status: DISCONTINUED | OUTPATIENT
Start: 2021-08-20 | End: 2021-08-21 | Stop reason: HOSPADM

## 2021-08-20 RX ORDER — PIOGLITAZONEHYDROCHLORIDE 30 MG/1
30 TABLET ORAL DAILY
Status: DISCONTINUED | OUTPATIENT
Start: 2021-08-21 | End: 2021-08-21 | Stop reason: HOSPADM

## 2021-08-20 RX ORDER — SODIUM CHLORIDE 0.9 % (FLUSH) 0.9 %
5-40 SYRINGE (ML) INJECTION EVERY 8 HOURS
Status: DISCONTINUED | OUTPATIENT
Start: 2021-08-20 | End: 2021-08-21 | Stop reason: HOSPADM

## 2021-08-20 RX ORDER — SODIUM CHLORIDE 9 MG/ML
50 INJECTION, SOLUTION INTRAVENOUS CONTINUOUS
Status: DISCONTINUED | OUTPATIENT
Start: 2021-08-20 | End: 2021-08-21 | Stop reason: HOSPADM

## 2021-08-20 RX ORDER — INSULIN GLARGINE 100 [IU]/ML
30 INJECTION, SOLUTION SUBCUTANEOUS DAILY
Status: DISCONTINUED | OUTPATIENT
Start: 2021-08-22 | End: 2021-08-21 | Stop reason: HOSPADM

## 2021-08-20 RX ORDER — LEVETIRACETAM 250 MG/1
500 TABLET ORAL 2 TIMES DAILY
Status: DISCONTINUED | OUTPATIENT
Start: 2021-08-20 | End: 2021-08-20

## 2021-08-20 RX ORDER — LEVETIRACETAM 500 MG/1
1500 TABLET ORAL 2 TIMES DAILY
Status: DISCONTINUED | OUTPATIENT
Start: 2021-08-20 | End: 2021-08-20

## 2021-08-20 RX ADMIN — SODIUM CHLORIDE 50 ML/HR: 9 INJECTION, SOLUTION INTRAVENOUS at 18:22

## 2021-08-20 RX ADMIN — LEVETIRACETAM 1500 MG: 500 TABLET, FILM COATED ORAL at 23:29

## 2021-08-20 RX ADMIN — ENOXAPARIN SODIUM 40 MG: 40 INJECTION SUBCUTANEOUS at 18:26

## 2021-08-20 RX ADMIN — SODIUM CHLORIDE 1000 ML: 9 INJECTION, SOLUTION INTRAVENOUS at 14:33

## 2021-08-20 RX ADMIN — ATORVASTATIN CALCIUM 40 MG: 40 TABLET, FILM COATED ORAL at 23:23

## 2021-08-20 NOTE — ED PROVIDER NOTES
EMERGENCY DEPARTMENT HISTORY AND PHYSICAL EXAM      Date: 8/20/2021  Patient Name: Volodymyr Arboleda    History of Presenting Illness     Chief Complaint   Patient presents with   [employer] Fall    Syncope       History Provided By: Patient    HPI: Volodymyr Arboleda, 80 y.o. female with a past medical history of hypertension, hyperlipidemia, IDDM, and CVA presents to the ED for syncope and fall on anticoagulation. Per report, patient was in the buffet when she was witnessed to suddenly collapsed on the ground and becoming unresponsive. She had head trauma. Afterwards, she woke up and was not altered and ambulated and then had another episode of syncope with unresponsiveness that resolved. Currently the patient is reporting a headache. Does not remember the episodes. Does not have any chest pain, herself, no pain, nausea, or vomiting. No lower extremity swelling or pain. No orthopnea. No other complaints. She was reported to be on blood thinners. There are no other complaints, changes, or physical findings at this time. PCP: Reynold Steinberg MD    Current Facility-Administered Medications   Medication Dose Route Frequency Provider Last Rate Last Admin    sodium chloride 0.9 % bolus infusion 1,000 mL  1,000 mL IntraVENous Xavier Vincent MD 1,000 mL/hr at 08/20/21 1433 1,000 mL at 08/20/21 1433     Current Outpatient Medications   Medication Sig Dispense Refill    pioglitazone (ACTOS) 30 mg tablet TAKE 1 TABLET BY MOUTH EVERY DAY 30 Tab 0    metFORMIN ER (GLUCOPHAGE XR) 750 mg tablet TAKE 1 TABLET BY MOUTH TWICE A  Tab 1    insulin glargine (LANTUS SOLOSTAR U-100 INSULIN) 100 unit/mL (3 mL) inpn Inject 28 units in the morning.  FAITH PEN NEEDLE 32 gauge x 5/32\" ndle USE 4 TIMES A DAY, AS INSTRUCTED. 200 Pen Needle 2    oxybutynin chloride XL (DITROPAN XL) 15 mg CR tablet Take 10 mg by mouth daily.  meloxicam (MOBIC) 15 mg tablet Take 15 mg by mouth daily.       azelastine (OPTIVAR) 0.05 % ophthalmic solution       RESTASIS 0.05 % ophthalmic emulsion       glucose blood VI test strips (TRUE METRIX GLUCOSE TEST STRIP) strip by Does Not Apply route See Admin Instructions. Use to check blood glucose 4x daily. Dx code: E11.65      insulin syringe-needle U-100 1 mL 31 gauge x 15/64\" syrg 1 Syringe by Does Not Apply route two (2) times a day. Dx code: E11.65 100 Pen Needle 11    furosemide (LASIX) 40 mg tablet Take 20 mg by mouth daily.  atorvastatin (LIPITOR) 40 mg tablet Take 40 mg by mouth nightly.  lisinopril (PRINIVIL, ZESTRIL) 5 mg tablet Take 5 mg by mouth daily.  levETIRAcetam (KEPPRA) 500 mg tablet Take  by mouth two (2) times a day.  aspirin delayed-release 81 mg tablet Take  by mouth daily.  potassium chloride (KAON 10%) 20 mEq/15 mL solution Take  by mouth daily. PILL         Past History   I reviewed the past medical hx, surgical hx, family hx, social hx, and allergy information and are listed here:    Past Medical History:  Past Medical History:   Diagnosis Date    Arthritis     [facility]     Diabetes [facility]     Hyperlipidemia     Hypertension     [facility]izure ([facility]     Seizures ([facility]     Stroke ([facility]        Past Surgical History:  Past Surgical History:   Procedure Laterality Date    HX CHOLECYSTECTOMY  2014       Family History:  Family History   Problem Relation Age of Onset    Cancer Mother         stomach cancer    Diabetes Mother     Hypertension Mother     Diabetes Sister     Hypertension Sister        Social History:  Social History     Tobacco Use    Smoking status: Former Smoker     Types: Cigarettes    Smokeless tobacco: Never Used   Substance Use Topics    Alcohol use: No    Drug use: No       Allergies:   Allergies   Allergen Reactions    Oxycodone-Acetaminophen Unknown (comments)    Vicodin [Hydrocodone-Acetaminophen] Unknown (comments)       Review of Systems     Review of Systems   Constitutional: Negative for chills and fever.   HENT: Negative for congestion, rhinorrhea and sore throat. Eyes: Negative. Respiratory: Negative for cough and shortness of breath. Cardiovascular: Negative for chest pain and leg swelling. Gastrointestinal: Negative for abdominal pain, nausea and vomiting. Endocrine: Negative. Genitourinary: Negative for dysuria, flank pain and hematuria. Musculoskeletal: Negative for back pain and myalgias. Skin: Negative for rash and wound. Allergic/Immunologic: Negative for immunocompromised state. Neurological: Positive for syncope and headaches. Negative for dizziness, weakness, light-headedness and numbness. Hematological: Negative. Psychiatric/Behavioral: Negative for agitation and confusion. Physical Exam and Vital Signs   Vital Signs - Reviewed the patient's vital signs. Patient Vitals for the past 12 hrs:   Temp Pulse Resp BP SpO2   08/20/21 1235 97.9 °F (36.6 °C) 78 18 (!) 148/67 96 %       Physical Exam:    PRIMARY SURVEY  GENERAL: awake, alert, cooperative, calm, not in distress  AIRWAY: Intact  BREATHING: Equal bilateral air entry. CIRCULATION: Initial BP normal. Access secured via 2x PIVs. Fluids initiated. DISABILITY: GCS 15    SECONDARY SURVEY  HEENT:  * PERRL, EOMI  * No raccoon eyes, no valadez sign  * No fractured teeth  * Head atraumatic  * Oropharynx clear without bleeding  * No C-spine tenderness  CV:  * regular rate   * +2 pulses in UE/LE bilaterally  PULMONARY: CTAB, no wheezes/crackles, good air movement  ABDOMEN: soft ND/NT. FAST negative. Bedside ultrasound also showed collapsible IVC. BACK: No midline spine tenderness, step offs, or deformities. EXTREMITIES: WWP, no tenderness, no edema, normal capillary refill  SKIN: no lacerations or abrasions. NEURO:  * Speech clear  * Moves U&LE to command        Medical Decision Making and ED Course   - I am the first and primary provider for this patient and am the primary provider of record.   - I reviewed the vital signs, available nursing notes, past medical history, past surgical history, family history and social history. - Initial assessment performed. The patients presenting problems have been discussed, and the staff are in agreement with the care plan formulated and outlined with them. I have encouraged them to ask questions as they arise throughout their visit. - Records Reviewed: Nursing Notes and Old Medical Records    MDM:   Patient is a 80 y.o. female presenting for syncope and head trauma with anticoagulation use. Vitals reveal normalities and physical exam reveals no abnormalities. EKG showed abnormalities. Based on the history, physical exam, risk factors, and vitals signs, I favor the following differential diagnoses:  ACS, CHF, arrhythmia, orthostatic hypotention, vasovagal syncope, hypoglycemia, hypotension, aortic stenosis, seizures, CVA, anemia, PE, ICH, CVA.     Results     Labs:     Recent Results (from the past 12 hour(s))   CBC W/O DIFF    Collection Time: 08/20/21 12:45 PM   Result Value Ref Range    WBC 7.0 3.6 - 11.0 K/uL    RBC 3.69 (L) 3.80 - 5.20 M/uL    HGB 12.1 11.5 - 16.0 g/dL    HCT 35.4 35.0 - 47.0 %    MCV 95.9 80.0 - 99.0 FL    MCH 32.8 26.0 - 34.0 PG    MCHC 34.2 30.0 - 36.5 g/dL    RDW 11.8 11.5 - 14.5 %    PLATELET 155 662 - 202 K/uL    MPV 11.0 8.9 - 12.9 FL    NRBC 0.0 0.0  WBC    ABSOLUTE NRBC 0.00 0.00 - 2.45 K/uL   METABOLIC PANEL, COMPREHENSIVE    Collection Time: 08/20/21 12:45 PM   Result Value Ref Range    Sodium 134 (L) 136 - 145 mmol/L    Potassium 3.8 3.5 - 5.1 mmol/L    Chloride 99 97 - 108 mmol/L    CO2 25 21 - 32 mmol/L    Anion gap 10 5 - 15 mmol/L    Glucose 237 (H) 65 - 100 mg/dL    BUN 9 6 - 20 mg/dL    Creatinine 0.88 0.55 - 1.02 mg/dL    BUN/Creatinine ratio 10 (L) 12 - 20      GFR est AA >60 >60 ml/min/1.73m2    GFR est non-AA >60 >60 ml/min/1.73m2    Calcium 9.2 8.5 - 10.1 mg/dL    Bilirubin, total 0.6 0.2 - 1.0 mg/dL    AST (SGOT) 19 15 - 37 U/L ALT (SGPT) 34 12 - 78 U/L    Alk. phosphatase 73 45 - 117 U/L    Protein, total 7.7 6.4 - 8.2 g/dL    Albumin 3.9 3.5 - 5.0 g/dL    Globulin 3.8 2.0 - 4.0 g/dL    A-G Ratio 1.0 (L) 1.1 - 2.2     LIPASE    Collection Time: 08/20/21 12:45 PM   Result Value Ref Range    Lipase 102 73 - 393 U/L   ETHYL ALCOHOL    Collection Time: 08/20/21 12:45 PM   Result Value Ref Range    ALCOHOL(ETHYL),SERUM <4 <10 mg/dL   LACTIC ACID    Collection Time: 08/20/21 12:45 PM   Result Value Ref Range    Lactic acid 2.2 (HH) 0.4 - 2.0 mmol/L   TYPE & SCREEN    Collection Time: 08/20/21 12:45 PM   Result Value Ref Range    Crossmatch Expiration 08/23/2021,2359     ABO/Rh(D) B Positive     Antibody screen Negative    TROPONIN I    Collection Time: 08/20/21 12:45 PM   Result Value Ref Range    Troponin-I, Qt. <0.05 <0.05 ng/mL   PROTHROMBIN TIME + INR    Collection Time: 08/20/21 12:45 PM   Result Value Ref Range    Prothrombin time 13.3 11.9 - 14.7 sec    INR 1.0 0.9 - 1.1     PTT    Collection Time: 08/20/21 12:45 PM   Result Value Ref Range    aPTT 29.4 21.2 - 34.1 sec    aPTT, therapeutic range   82 - 109 sec   VENOUS BLOOD GAS    Collection Time: 08/20/21  2:20 PM   Result Value Ref Range    VENOUS PH 7.42 (H) 7.31 - 7.41      VENOUS PCO2 43 40 - 50 mmHg    VENOUS PO2 106 (H) 36 - 42 mmHg    VENOUS O2 SATURATION 99 (H) 60 - 80 %    VENOUS BICARBONATE 27 (H) 22 - 26 mmol/L    VENOUS BASE EXCESS 2.4 (H) 0 - 2 mmol/L    O2 METHOD PENDING     Tidal volume PENDING     PRESSURE SUPPORT PENDING     SITE Run repeated         Radiologic Studies:  CT Results  (Last 48 hours)               08/20/21 1407  CT HEAD WO CONT Final result    Impression:  No change compared to CT head 5/21/2021. Narrative:  CT head. Comparison CT head May 21, 2021. Axial images are reviewed along with reformatted sagittal/coronal images.     Dose reduction: All CT scans at this facility are performed using dose reduction   optimization techniques as appropriate to a performed exam including the   following-   automated exposure control, adjustments of mA and/or Kv according to patient   size, or use of iterative reconstructive technique. Bone windows demonstrate likely small mucous retention cyst base right maxillary   sinus. Otherwise sinuses and mastoid air cells are normally aerated. Prior   ophthalmologic surgery. Review of intracranial content reveals preserved gray-white differentiation. Central cerebral arteriosclerosis. No hydrocephalus. No intracranial hemorrhage. CXR Results  (Last 48 hours)               08/20/21 1248  XR CHEST PORT Final result    Impression:  No acute findings. Narrative:  Weakness, syncope. Comparison chest x-ray 12/10/2020, 8/3/2019. Findings: Single frontal view of the chest. Normal heart size. No vascular   congestion or pulmonary edema. 2.2 cm calcification projects over the left lung,   correlates to a benign breast calcification when compared to mammogram 2019. No   infiltrate, pleural effusion, pneumothorax. Bony structures grossly intact, with   degenerative changes. Chronic widening of the left AC joint. Medications ordered:  Medications   sodium chloride 0.9 % bolus infusion 1,000 mL (1,000 mL IntraVENous New Bag 8/20/21 1433)        ED Course     ED Course:     ED Course as of Aug 20 1451   Fri Aug 20, 2021   1321 CBC without significant anemia or leukocytosis    [SS]   1334 Chest x-ray negative, no concern for pulmonary edema, pleural effusion, pneumothorax, or pneumonia. XR CHEST PORT [SS]      ED Course User Index  [SS] Mao Joy MD       Reassessment / Disposition Discussion:    Patient's lactate is elevated to 2.4. BMP and CBC largely unremarkable. CT head is negative for acute bleed. Chest x-ray is negative for pneumothorax.   D-dimer is ordered and is pending to rule out PE as a cause of her syncope however the patient is not tachycardic or hypoxic thus this is unlikely. The patient will need to be admitted for high risk syncope. Disposition     Disposition: Condition stable  Admitted to Floor Medical Floor the case was discussed with the admitting physician Dr. Peter Jose. Admitted    ADMISSION:  After completion of ED workup and discussion of results and diagnoses with the patient, patient was admitted to the hospital. All the patient's questions were answered. Case was discussed with the receiving team.      ED Consultations     Consultations: - NONE    ED Procedures   Performed by: Nahomy Dorsey MD  Procedures     EKG interpretation (Preliminary):  Performed at 12.39, and read at 12.39. Rhythm: normal sinus rhythm; and regular . Rate (approx.): 71. Axis: normal;  MS interval: normal;  QRS interval: normal ;  ST/T wave: T wave inverted; III  Other findings: abnormal ekg. Diagnosis     Clinical Impression:   1. Syncope and collapse        Attestations:    Nahomy Dorsey MD    Please note that this dictation was completed with [id number], the computer voice recognition software. Quite often unanticipated grammatical, syntax, homophones, and other interpretive errors are inadvertently transcribed by the computer software. Please disregard these errors. Please excuse any errors that have escaped final proofreading. Thank you.

## 2021-08-20 NOTE — PROGRESS NOTES
Comments:  Visit attempted for patient in ED for CODE BRAVO. Staff were providing care for patient. No family members were present.  provided silent support and prayer as well as staff support. Chaplains will follow up if further referrals are requested. Chaplain Hallie Benson M.Div.    can be reached by calling the  at [facility]  [phone]

## 2021-08-20 NOTE — H&P
History and Physical    Patient: Isaias Warren MRN: [id number]  SSN: [id number]    YOB: 1950  Age: 80 y.o. Sex: female      Subjective 10 units tonight and Humalog 10 units of regular insulin thank you      Isaias Warren is a 80 y.o. female who presented with syncope x2. Patient denies any chest pain denies any headache no bladder incontinence. She has a history of seizure claims to be compliant with her medications. Has had problems with sleep all she. .      Past Medical History:   Diagnosis Date    Arthritis     [facility] ([facility] [address]     Diabetes ([facility] [facility]     Hyperlipidemia     Hypertension     [facility]     Seizures ([facility]     Stroke [facility])      Past Surgical History:   Procedure Laterality Date    HX CHOLECYSTECTOMY  2014      Family History   Problem Relation Age of Onset    Cancer Mother         stomach cancer    Diabetes Mother     Hypertension Mother     Diabetes Sister     Hypertension Sister      Social History     Tobacco Use    Smoking status: Former Smoker     Types: Cigarettes    Smokeless tobacco: Never Used   Substance Use Topics    Alcohol use: No      Prior to Admission medications    Medication Sig Start Date End Date Taking? Authorizing Provider   pioglitazone (ACTOS) 30 mg tablet TAKE 1 TABLET BY MOUTH EVERY DAY 5/23/20   Yumiko Rogers MD   metFORMIN ER (GLUCOPHAGE XR) 750 mg tablet TAKE 1 TABLET BY MOUTH TWICE A DAY 3/3/20   Ksenia Gayle MD   insulin glargine (LANTUS SOLOSTAR U-100 INSULIN) 100 unit/mL (3 mL) inpn Inject 28 units in the morning. 7/24/19   Ksenia Gayle MD   oxybutynin chloride XL (DITROPAN XL) 15 mg CR tablet Take 10 mg by mouth daily. Provider, Historical   azelastine (OPTIVAR) 0.05 % ophthalmic solution  6/15/17   Provider, Historical   RESTASIS 0.05 % ophthalmic emulsion  5/20/17   Provider, Historical   atorvastatin (LIPITOR) 40 mg tablet Take 40 mg by mouth nightly.     Provider, Historical   levETIRAcetam (KEPPRA) 500 mg tablet Take  by mouth two (2) times a day. Provider, Historical   aspirin delayed-release 81 mg tablet Take  by mouth daily. Provider, Historical        Allergies   Allergen Reactions    Oxycodone-Acetaminophen Unknown (comments)    Vicodin [Hydrocodone-Acetaminophen] Unknown (comments)       Review of Systems:  A comprehensive review of systems was negative except for that written in the History of Present Illness. Objective:     Vitals:    08/20/21 1235   BP: (!) 148/67   Pulse: 78   Resp: 18   Temp: 97.9 °F (36.6 °C)   SpO2: 96%   Weight: 91.6 kg (202 lb)   Height: 5' 2\" (1.575 m)        Physical Exam:  General:  Alert, cooperative, no distress, appears stated age. Eyes:  Conjunctivae/corneas clear. PERRL, EOMs intact. Fundi benign   Ears:  Normal TMs and external ear canals both ears. Nose: Nares normal. Septum midline. Mucosa normal. No drainage or sinus tenderness. Mouth/Throat: Lips, mucosa, and tongue normal. Teeth and gums normal.   Neck: Supple, symmetrical, trachea midline, no adenopathy, thyroid: no enlargment/tenderness/nodules, no carotid bruit and no JVD. Back:   Symmetric, no curvature. ROM normal. No CVA tenderness. Lungs:   Clear to auscultation bilaterally. Heart:  Regular rate and rhythm, S1, S2 normal, no murmur, click, rub or gallop. Abdomen:   Soft, non-tender. Bowel sounds normal. No masses,  No organomegaly. Extremities: Extremities normal, atraumatic, no cyanosis or edema. Pulses: 2+ and symmetric all extremities. Skin: Skin color, texture, turgor normal. No rashes or lesions   Lymph nodes: Cervical, supraclavicular, and axillary nodes normal.   Neurologic: CNII-XII intact. Normal strength, sensation and reflexes throughout.        Assessment:     Hospital Problems  Date Reviewed: 7/24/2019        Codes Class Noted POA    Syncope ICD-10-CM: R55  ICD-9-CM: 780.2  8/20/2021 Unknown        Syncope and collapse ICD-10-CM: R55  ICD-9-CM: 780.2  8/20/2021 Unknown        Seizure disorder [facility]) ICD-10-CM: G40.909  ICD-9-CM: 345.90  8/20/2021 Unknown              Plan:     Is on telemetry  Continue with home medications  Obtain consult with cardiology and neurology obtain  I discussed with patient daughter by the bedside    Signed By: Shannon Hutchinson MD     August 20, 2021

## 2021-08-20 NOTE — Clinical Note
Status[de-identified] INPATIENT [101]   Type of Bed: Telemetry [19]   Cardiac Monitoring Required?: Yes   Inpatient Hospitalization Certified Necessary for the Following Reasons: 3.  Patient receiving treatment that can only be provided in an inpatient setting (further clarification in H&P documentation)   Admitting Diagnosis: Syncope [206001]   Admitting Physician: Timothy Day   Attending Physician: Timothy Day   Estimated Length of Stay: 2 Midnights   Discharge Plan[de-identified] Home with Office Follow-up

## 2021-08-21 ENCOUNTER — APPOINTMENT (OUTPATIENT)
Dept: NON INVASIVE DIAGNOSTICS | Age: 71
End: 2021-08-21
Attending: INTERNAL MEDICINE
Payer: MEDICARE

## 2021-08-21 VITALS
BODY MASS INDEX: 37.17 KG/M2 | TEMPERATURE: 97.8 F | HEIGHT: 62 IN | WEIGHT: 202 LBS | RESPIRATION RATE: 16 BRPM | OXYGEN SATURATION: 93 % | SYSTOLIC BLOOD PRESSURE: 131 MMHG | DIASTOLIC BLOOD PRESSURE: 60 MMHG | HEART RATE: 68 BPM

## 2021-08-21 LAB
ALBUMIN SERPL-MCNC: 3.3 G/DL (ref 3.5–5)
ALBUMIN/GLOB SERPL: 0.9 {RATIO} (ref 1.1–2.2)
ALP SERPL-CCNC: 60 U/L (ref 45–117)
ALT SERPL-CCNC: 29 U/L (ref 12–78)
ANION GAP SERPL CALC-SCNC: 8 MMOL/L (ref 5–15)
AST SERPL W P-5'-P-CCNC: 16 U/L (ref 15–37)
BILIRUB SERPL-MCNC: 0.5 MG/DL (ref 0.2–1)
BUN SERPL-MCNC: 8 MG/DL (ref 6–20)
BUN/CREAT SERPL: 14 (ref 12–20)
CA-I BLD-MCNC: 8.7 MG/DL (ref 8.5–10.1)
CHLORIDE SERPL-SCNC: 103 MMOL/L (ref 97–108)
CO2 SERPL-SCNC: 25 MMOL/L (ref 21–32)
CREAT SERPL-MCNC: 0.56 MG/DL (ref 0.55–1.02)
ECHO AO ROOT DIAM: 3 CM
ECHO AV AREA PEAK VELOCITY: 2.39 CM2
ECHO AV AREA/BSA PEAK VELOCITY: 1.2 CM2/M2
ECHO AV PEAK GRADIENT: 8 MMHG
ECHO AV PEAK VELOCITY: 143 CM/S
ECHO LA AREA 4C: 21.6 CM2
ECHO LA MAJOR AXIS: 3.8 CM
ECHO LA MINOR AXIS: 1.98 CM
ECHO LA TO AORTIC ROOT RATIO: 1.27
ECHO LV E' SEPTAL VELOCITY: 6.34 CM/S
ECHO LV EDV A2C: 146 CM3
ECHO LV ESV A2C: 42.5 CM3
ECHO LV ESV A2C: 44.8 CM3
ECHO LV INTERNAL DIMENSION DIASTOLIC MMODE: 4.93 CM
ECHO LV INTERNAL DIMENSION DIASTOLIC: 5.27 CM (ref 3.9–5.3)
ECHO LV INTERNAL DIMENSION SYSTOLIC MMODE: 3.32 CM
ECHO LV INTERNAL DIMENSION SYSTOLIC: 3.49 CM
ECHO LV IVSD MMODE: 1.39 CM
ECHO LV IVSD: 1.05 CM (ref 0.6–0.9)
ECHO LV MASS 2D: 209.2 G (ref 67–162)
ECHO LV MASS INDEX 2D: 109 G/M2 (ref 43–95)
ECHO LV POSTERIOR WALL DIASTOLIC MMODE: 1.34 CM
ECHO LV POSTERIOR WALL DIASTOLIC: 1.03 CM (ref 0.6–0.9)
ECHO LVOT DIAM: 2.2 CM
ECHO LVOT PEAK GRADIENT: 3 MMHG
ECHO LVOT PEAK VELOCITY: 89.8 CM/S
ECHO LVOT SV: 83.4 CM3
ECHO MAIN PULMONARY ARTERY DIAMETER: 2.5 CM
ECHO MV A VELOCITY: 69.4 CM/S
ECHO MV E DECELERATION TIME (DT): 218 MS
ECHO MV E VELOCITY: 73.1 CM/S
ECHO MV E/A RATIO: 1.05
ECHO MV E/E' SEPTAL: 11.53
ECHO PV MAX VELOCITY: 92 CM/S
ECHO PV PEAK INSTANTANEOUS GRADIENT SYSTOLIC: 3 MMHG
ECHO RA AREA 4C: 21.13 CM2
ECHO RV INTERNAL DIMENSION: 3.37 CM
ECHO RV TAPSE: 1.8 CM (ref 1.5–2)
ECHO TV MAX VELOCITY: 211 CM/S
ECHO TV REGURGITANT PEAK GRADIENT: 18 MMHG
EST. AVERAGE GLUCOSE BLD GHB EST-MCNC: 174 MG/DL
GLOBULIN SER CALC-MCNC: 3.5 G/DL (ref 2–4)
GLUCOSE SERPL-MCNC: 186 MG/DL (ref 65–100)
HBA1C MFR BLD: 7.7 % (ref 4–5.6)
POTASSIUM SERPL-SCNC: 3.8 MMOL/L (ref 3.5–5.1)
PROT SERPL-MCNC: 6.8 G/DL (ref 6.4–8.2)
SODIUM SERPL-SCNC: 136 MMOL/L (ref 136–145)
TROPONIN I SERPL-MCNC: <0.05 NG/ML
TROPONIN I SERPL-MCNC: <0.05 NG/ML
TSH SERPL DL<=0.05 MIU/L-ACNC: 1.25 UIU/ML (ref 0.36–3.74)

## 2021-08-21 PROCEDURE — 84484 ASSAY OF TROPONIN QUANT: CPT

## 2021-08-21 PROCEDURE — 99218 HC RM OBSERVATION: CPT

## 2021-08-21 PROCEDURE — 93005 ELECTROCARDIOGRAM TRACING: CPT

## 2021-08-21 PROCEDURE — 93306 TTE W/DOPPLER COMPLETE: CPT

## 2021-08-21 PROCEDURE — 84443 ASSAY THYROID STIM HORMONE: CPT

## 2021-08-21 PROCEDURE — 74011250637 HC RX REV CODE- 250/637: Performed by: INTERNAL MEDICINE

## 2021-08-21 PROCEDURE — 65270000029 HC RM PRIVATE

## 2021-08-21 PROCEDURE — 80053 COMPREHEN METABOLIC PANEL: CPT

## 2021-08-21 PROCEDURE — 74011250637 HC RX REV CODE- 250/637: Performed by: STUDENT IN AN ORGANIZED HEALTH CARE EDUCATION/TRAINING PROGRAM

## 2021-08-21 PROCEDURE — 36415 COLL VENOUS BLD VENIPUNCTURE: CPT

## 2021-08-21 PROCEDURE — 74011000250 HC RX REV CODE- 250: Performed by: INTERNAL MEDICINE

## 2021-08-21 RX ORDER — INSULIN GLARGINE 100 [IU]/ML
30 INJECTION, SOLUTION SUBCUTANEOUS
Qty: 1 VIAL | Refills: 0 | Status: SHIPPED | OUTPATIENT
Start: 2021-08-21

## 2021-08-21 RX ORDER — LEVETIRACETAM 750 MG/1
1500 TABLET ORAL 2 TIMES DAILY
Qty: 60 TABLET | Refills: 0 | Status: SHIPPED | OUTPATIENT
Start: 2021-08-21 | End: 2022-01-11

## 2021-08-21 RX ADMIN — OXYBUTYNIN CHLORIDE 10 MG: 5 TABLET, EXTENDED RELEASE ORAL at 10:49

## 2021-08-21 RX ADMIN — KETOTIFEN FUMARATE 1 DROP: 0.35 SOLUTION/ DROPS OPHTHALMIC at 09:00

## 2021-08-21 RX ADMIN — LEVETIRACETAM 1500 MG: 500 TABLET, FILM COATED ORAL at 10:49

## 2021-08-21 RX ADMIN — ASPIRIN 81 MG: 81 TABLET, COATED ORAL at 10:49

## 2021-08-21 RX ADMIN — Medication 10 ML: at 06:15

## 2021-08-21 RX ADMIN — PIOGLITAZONE 30 MG: 30 TABLET ORAL at 10:49

## 2021-08-21 NOTE — ROUTINE PROCESS
TRANSFER - OUT REPORT:    Verbal report given to [facility]  being transferred to  for routine progression of care       Report consisted of patients Situation, Background, Assessment and   Recommendations(SBAR). Information from the following report(s) SBAR, ED Summary, Intake/Output, MAR and Recent Results was reviewed with the receiving nurse. Lines:   Peripheral IV 08/20/21 Left Antecubital (Active)   Site Assessment Clean, dry, & intact 08/20/21 1245   Phlebitis Assessment 0 08/20/21 1245   Infiltration Assessment 0 08/20/21 1245   Dressing Status Clean, dry, & intact 08/20/21 1245   Hub Color/Line Status Green 08/20/21 1245        Opportunity for questions and clarification was provided.       Patient transported with:   Sage

## 2021-08-21 NOTE — PROGRESS NOTES
Primary Nurse Rio Valladares and Tereso Rico performed a dual skin assessment on this patient. Skin clean, dry, and intact. No skin concerns noted. Clark score is 19.

## 2021-08-21 NOTE — CONSULTS
Consult    NAME: Tre Decker   :  1950   MRN:  [id number]     Date/Time:  2021 2:00 PM    Patient PCP: Rosa Nelson MD  ________________________________________________________________________     Problem List:   - fall  -syncope        Assessment/Plan:   21  - patient observed lying in bed with eyes opened. Denies chest pain, shortness of breath, palpitations, lower extremity pain/swelling, dizziness or distress. No acute distress noted. - past medical history of seizures, arthritis, breast cancer, HTN, hyperlipidemia, Type II DM. No known coronary artery disease, no prior cardiovascular surgeries  - presented to the emergency room after episode of dizziness and falling while eating lunch with sister. .  - sinus rhythm on telemetry   - no acute cardiovascular events reported overnight  - hemodynamically stable  - Hgb 12.1/Hct 35.4  - Bun 8/Creat 0.56  - troponin negative  - EF of 60%-65% without significant valvular disease  - repeat EKG  - we will continue conservative cardiovascular management and follow patient during hospitalization along with the team.  - may follow up with us in 1-2 weeks post discharge    Thank you for allowing us to care for Ms. Hope      []        High complexity decision making was performed      Patient Active Problem List   Diagnosis Code    Seizure ([facility] [address] R56.9    Arthritis M19.90    Breast cancer ([facility] C50.919    Essential hypertension with goal blood pressure less than 140/90 I10    Hyperlipidemia E78.5    Type 2 diabetes mellitus with hyperglycemia, with long-term current use of insulin (HCC) E11.65, Z79.4    Mixed hyperlipidemia E78.2    Syncope R55    Syncope and collapse R55    Seizure disorder ([facility]) G40.909        Subjective:   CHIEF COMPLAINT:     HISTORY OF PRESENT ILLNESS: Ms. Tre Decker is a 80year old female with a past medical history of IDDM, hyperlipidemia, hypertension, and CVA presents to the emergency department for episode of dizziness and falling while eating with sister. Patient is on anticoagulation. She had head trauma. Afterwards, she woke up and was not altered and ambulated and then had another episode of syncope with unresponsiveness that resolved. Cardiology consulted for syncope. Initial EKG shows sinus rhythm with ST-T wave changes will repeat. Troponin negative <0.05. Echocardiogram EF of 60%-65% without significant valvular disease. There are no other complaints, changes, or physical findings at this time        We were asked to consult for work up and evaluation of the above problems.      Past Medical History:   Diagnosis Date    Arthritis     [facility] ([facility]     [facility]     Hyperlipidemia     Hypertension     [facility]     Seizures ([facility]     Stroke ([facility]       Past Surgical History:   Procedure Laterality Date    HX CHOLECYSTECTOMY  2014     Allergies   Allergen Reactions    Oxycodone-Acetaminophen Unknown (comments)    Vicodin [Hydrocodone-Acetaminophen] Unknown (comments)      Meds:  See below  Social History     Tobacco Use    Smoking status: Former Smoker     Types: Cigarettes    Smokeless tobacco: Never Used   Substance Use Topics    Alcohol use: No      Family History   Problem Relation Age of Onset    Cancer Mother         stomach cancer    Diabetes Mother     Hypertension Mother     Diabetes Sister     Hypertension Sister        REVIEW OF SYSTEMS:     []         Unable to obtain  ROS due to ---   [x]         Total of 12 systems reviewed as follows:    Constitutional: negative fever, negative chills, negative weight loss  Eyes:   negative visual changes  ENT:   negative sore throat, tongue or lip swelling  Respiratory:  negative cough, negative dyspnea  Cards:  negative for chest pain, palpitations, lower extremity edema  GI:   negative for nausea, vomiting, diarrhea, and abdominal pain  Genitourinary: negative for frequency, dysuria  Integument:  negative for rash Hematologic:  negative for easy bruising and gum/nose bleeding  Musculoskel: negative for myalgias,  back pain  Neurological:  negative for headaches, dizziness, vertigo, weakness  Behavl/Psych: negative for feelings of anxiety, depression     Pertinent Positives include : none reported    Objective:      Physical Exam:    Last 24hrs VS reviewed since prior progress note. Most recent are:    Visit Vitals  BP (!) 150/64 (BP 1 Location: Right upper arm, BP Patient Position: At rest)   Pulse 64   Temp 97.8 °F (36.6 °C)   Resp 14   Ht 5' 2\" (1.575 m)   Wt 91.6 kg (202 lb)   SpO2 96%   BMI 36.95 kg/m²     No intake or output data in the 24 hours ending 08/21/21 1410     General Appearance: Well developed, well nourished, alert & oriented x 3,    no acute distress. Ears/Nose/Mouth/Throat: Pupils equal and round, Hearing grossly normal.  Neck: Supple. JVP within normal limits. Carotids good upstrokes, with no bruit. Chest: Lungs clear to auscultation bilaterally. Cardiovascular: Regular rate and rhythm, S1S2 normal, no murmur, rubs, gallops. Abdomen: Soft, non-tender, bowel sounds are active. No organomegaly. Extremities: Trace, non-pitting edema bilaterally. Femoral pulses +2, Distal Pulses +1. Skin: Warm and dry. Neuro: CN II-XII grossly intact, Strength and sensation grossly intact. CT HEAD WO CONT   Final Result   No change compared to CT head 5/21/2021. XR CHEST PORT   Final Result   No acute findings. Data:      Telemetry: sinus rhythm    EKG:  []  No new EKG for review. Prior to Admission medications    Medication Sig Start Date End Date Taking? Authorizing Provider   pioglitazone (ACTOS) 30 mg tablet TAKE 1 TABLET BY MOUTH EVERY DAY 5/23/20   Alberto Hodge MD   metFORMIN ER (GLUCOPHAGE XR) 750 mg tablet TAKE 1 TABLET BY MOUTH TWICE A DAY 3/3/20   Alberto Hodge MD   insulin glargine (LANTUS SOLOSTAR U-100 INSULIN) 100 unit/mL (3 mL) inpn Inject 28 units in the morning. 7/24/19   Vesta Hernandez MD   oxybutynin chloride XL (DITROPAN XL) 15 mg CR tablet Take 10 mg by mouth daily. Provider, Historical   azelastine (OPTIVAR) 0.05 % ophthalmic solution  6/15/17   Provider, Historical   RESTASIS 0.05 % ophthalmic emulsion  5/20/17   Provider, Historical   atorvastatin (LIPITOR) 40 mg tablet Take 40 mg by mouth nightly. Provider, Historical   levETIRAcetam (KEPPRA) 500 mg tablet Take  by mouth two (2) times a day. Provider, Historical   aspirin delayed-release 81 mg tablet Take  by mouth daily.     Provider, Historical       Recent Results (from the past 24 hour(s))   VENOUS BLOOD GAS    Collection Time: 08/20/21  2:20 PM   Result Value Ref Range    VENOUS PH 7.42 (H) 7.31 - 7.41      VENOUS PCO2 43 40 - 50 mmHg    VENOUS PO2 106 (H) 36 - 42 mmHg    VENOUS O2 SATURATION 99 (H) 60 - 80 %    VENOUS BICARBONATE 27 (H) 22 - 26 mmol/L    VENOUS BASE EXCESS 2.4 (H) 0 - 2 mmol/L    O2 METHOD PENDING     Tidal volume PENDING     PRESSURE SUPPORT PENDING     SITE Run repeated     COVID-19 RAPID TEST    Collection Time: 08/20/21  2:40 PM   Result Value Ref Range    Specimen source Please find results under separate order      COVID-19 rapid test Not Detected Not Detected     D DIMER    Collection Time: 08/20/21  5:30 PM   Result Value Ref Range    D DIMER <0.27 <0.50 ug/ml(FEU)   TROPONIN I    Collection Time: 08/20/21  5:30 PM   Result Value Ref Range    Troponin-I, Qt. <0.05 <0.05 ng/mL   HEMOGLOBIN A1C WITH EAG    Collection Time: 08/20/21  5:30 PM   Result Value Ref Range    Hemoglobin A1c 7.7 (H) 4.0 - 5.6 %    Est. average glucose 174 mg/dL   TROPONIN I    Collection Time: 08/21/21  1:10 AM   Result Value Ref Range    Troponin-I, Qt. <0.05 <0.05 ng/mL   TSH 3RD GENERATION    Collection Time: 08/21/21  1:10 AM   Result Value Ref Range    TSH 1.25 0.36 - 9.67 uIU/mL   METABOLIC PANEL, COMPREHENSIVE    Collection Time: 08/21/21  1:10 AM   Result Value Ref Range    Sodium 136 136 - 145 mmol/L    Potassium 3.8 3.5 - 5.1 mmol/L    Chloride 103 97 - 108 mmol/L    CO2 25 21 - 32 mmol/L    Anion gap 8 5 - 15 mmol/L    Glucose 186 (H) 65 - 100 mg/dL    BUN 8 6 - 20 mg/dL    Creatinine 0.56 0.55 - 1.02 mg/dL    BUN/Creatinine ratio 14 12 - 20      GFR est AA >60 >60 ml/min/1.73m2    GFR est non-AA >60 >60 ml/min/1.73m2    Calcium 8.7 8.5 - 10.1 mg/dL    Bilirubin, total 0.5 0.2 - 1.0 mg/dL    AST (SGOT) 16 15 - 37 U/L    ALT (SGPT) 29 12 - 78 U/L    Alk.  phosphatase 60 45 - 117 U/L    Protein, total 6.8 6.4 - 8.2 g/dL    Albumin 3.3 (L) 3.5 - 5.0 g/dL    Globulin 3.5 2.0 - 4.0 g/dL    A-G Ratio 0.9 (L) 1.1 - 2.2     ECHO ADULT COMPLETE    Collection Time: 08/21/21 10:16 AM   Result Value Ref Range    Aortic Valve Systolic Peak Velocity 176.47 cm/s    AoV PG 8.00 mmHg    Aortic Valve Area by Continuity of Peak Velocity 2.39 cm2    Ao Root D 3.00 cm    IVSd 1.05 (A) 0.60 - 0.90 cm    IVSd (M-mode) 1.39 (A) cm    LVIDd 5.27 3.90 - 5.30 cm    LVIDd (M-mode) 4.93 cm    LVIDs 3.49 cm    LVIDs (M-mode) 3.32 cm    LVOT d 2.20 cm    LVOT Peak Velocity 89.80 cm/s    LVOT Peak Gradient 3.00 mmHg    LVPWd 1.03 (A) 0.60 - 0.90 cm    LVPWd (M-mode) 1.34 (A) cm    LV E' Septal Velocity 6.34 cm/s    LV ED Vol A2C 146.00 cm3    LV ES Vol A2C 42.50 cm3    LV ES Vol A2C 44.80 cm3    E/E' septal 11.53     LV Mass .2 (A) 67.0 - 162.0 g    LV Mass AL Index 109.0 (A) 43.0 - 95.0 g/m2    LVOT SV 83.4 cm3    Left Atrium Major Axis 3.80 cm    Left Atrium to Aortic Root Ratio 1.27     Main Pulmonary Artery Diameter 2.50 cm    Mitral Valve E Wave Deceleration Time 218.00 ms    MV A Mike 69.40 cm/s    MV E Mike 73.10 cm/s    MV E/A 1.05     Pulmonic Valve Max Velocity 92.00 cm/s    Pulmonic Valve Systolic Peak Instantaneous Gradient 3.00 mmHg    RVIDd 3.37 cm    Tricuspid Valve Max Velocity 211.00 cm/s    Triscuspid Valve Regurgitation Peak Gradient 18.00 mmHg    Tapse 1.80 1.50 - 2.00 cm    Right Atrial Area 4C 21.13 cm2 LA Area 4C 21.60 cm2    Left Atrium Minor Axis 1.98 cm    LOGAN/BSA Pk Mike 1.2 cm2/m2   TROPONIN I    Collection Time: 08/21/21 12:00 PM   Result Value Ref Range    Troponin-I, Qt. <0.05 <0.05 ng/mL        Kayli Griffin NP

## 2021-08-21 NOTE — DISCHARGE SUMMARY
Discharge Summary     Patient: Tresa Jones MRN: [id number]  SSN: [id number]    YOB: 1950  Age: 80 y.o. Sex: female       Admit Date: 8/20/2021    Discharge Date: 8/21/2021      Admission Diagnoses: Syncope [R55]  Syncope and collapse [R55]  Seizure disorder ([facility] [G40.909]    Discharge Diagnoses:   Problem List as of 8/21/2021 Date Reviewed: 7/24/2019        Codes Class Noted - Resolved    Syncope ICD-10-CM: R55  ICD-9-CM: 780.2  8/20/2021 - Present        Syncope and collapse ICD-10-CM: R55  ICD-9-CM: 780.2  8/20/2021 - Present        Seizure disorder ([facility] ICD-10-CM: G40.909  ICD-9-CM: 345.90  8/20/2021 - Present        Mixed hyperlipidemia ICD-10-CM: E78.2  ICD-9-CM: 272.2  2/20/2018 - Present        Type 2 diabetes mellitus with hyperglycemia, with long-term current use of insulin ([facility] ICD-10-CM: E11.65, Z79.4  ICD-9-CM: 250.00, 790.29, V58.67  12/21/2016 - Present        Essential hypertension with goal blood pressure less than 140/90 ICD-10-CM: I10  ICD-9-CM: 401.9  5/21/2016 - Present        Hyperlipidemia ICD-10-CM: E78.5  ICD-9-CM: 272.4  5/21/2016 - Present        Seizure ([facility] ICD-10-CM: R56.9  ICD-9-CM: 780.39  Unknown - Present        Arthritis ICD-10-CM: M19.90  ICD-9-CM: 716.90  Unknown - Present        Breast cancer ([facility] ICD-10-CM: C50.919  ICD-9-CM: 174.9  Unknown - Present        RESOLVED: Type II diabetes mellitus, uncontrolled ([facility] ICD-10-CM: E11.65  ICD-9-CM: 250.02  5/21/2016 - 2/20/2018        RESOLVED: Hypertension ICD-10-CM: I10  ICD-9-CM: 401.9  Unknown - 5/21/2016        RESOLVED: Hyperlipidemia ICD-10-CM: E78.5  ICD-9-CM: 272.4  Unknown - 5/21/2016        RESOLVED: Diabetes ([facility] ICD-10-CM: E11.9  ICD-9-CM: 250.00  Unknown - 5/21/2016               Discharge Condition: Good    Hospital Course: 80year-old patient brought to the hospital for near syncopal attack x2. Patient was admitted and seen by cardiology and neurology staff.   Adjustment was made to patient's medications. Had an echocardiogram which showed EF of 60% with mild diastolic dysfunction patient has been advised to follow-up with her primary medical physician and her neurologist    Consults: Cardiology and Neurology    Significant Diagnostic Studies: labs:   Recent Results (from the past 24 hour(s))   D DIMER    Collection Time: 08/20/21  5:30 PM   Result Value Ref Range    D DIMER <0.27 <0.50 ug/ml(FEU)   TROPONIN I    Collection Time: 08/20/21  5:30 PM   Result Value Ref Range    Troponin-I, Qt. <0.05 <0.05 ng/mL   HEMOGLOBIN A1C WITH EAG    Collection Time: 08/20/21  5:30 PM   Result Value Ref Range    Hemoglobin A1c 7.7 (H) 4.0 - 5.6 %    Est. average glucose 174 mg/dL   TROPONIN I    Collection Time: 08/21/21  1:10 AM   Result Value Ref Range    Troponin-I, Qt. <0.05 <0.05 ng/mL   TSH 3RD GENERATION    Collection Time: 08/21/21  1:10 AM   Result Value Ref Range    TSH 1.25 0.36 - 7.32 uIU/mL   METABOLIC PANEL, COMPREHENSIVE    Collection Time: 08/21/21  1:10 AM   Result Value Ref Range    Sodium 136 136 - 145 mmol/L    Potassium 3.8 3.5 - 5.1 mmol/L    Chloride 103 97 - 108 mmol/L    CO2 25 21 - 32 mmol/L    Anion gap 8 5 - 15 mmol/L    Glucose 186 (H) 65 - 100 mg/dL    BUN 8 6 - 20 mg/dL    Creatinine 0.56 0.55 - 1.02 mg/dL    BUN/Creatinine ratio 14 12 - 20      GFR est AA >60 >60 ml/min/1.73m2    GFR est non-AA >60 >60 ml/min/1.73m2    Calcium 8.7 8.5 - 10.1 mg/dL    Bilirubin, total 0.5 0.2 - 1.0 mg/dL    AST (SGOT) 16 15 - 37 U/L    ALT (SGPT) 29 12 - 78 U/L    Alk.  phosphatase 60 45 - 117 U/L    Protein, total 6.8 6.4 - 8.2 g/dL    Albumin 3.3 (L) 3.5 - 5.0 g/dL    Globulin 3.5 2.0 - 4.0 g/dL    A-G Ratio 0.9 (L) 1.1 - 2.2     ECHO ADULT COMPLETE    Collection Time: 08/21/21 10:16 AM   Result Value Ref Range    Aortic Valve Systolic Peak Velocity 114.14 cm/s    AoV PG 8.00 mmHg    Aortic Valve Area by Continuity of Peak Velocity 2.39 cm2    Ao Root D 3.00 cm    IVSd 1.05 (A) 0.60 - 0.90 cm    IVSd (M-mode) 1.39 (A) cm    LVIDd 5.27 3.90 - 5.30 cm    LVIDd (M-mode) 4.93 cm    LVIDs 3.49 cm    LVIDs (M-mode) 3.32 cm    LVOT d 2.20 cm    LVOT Peak Velocity 89.80 cm/s    LVOT Peak Gradient 3.00 mmHg    LVPWd 1.03 (A) 0.60 - 0.90 cm    LVPWd (M-mode) 1.34 (A) cm    LV E' Septal Velocity 6.34 cm/s    LV ED Vol A2C 146.00 cm3    LV ES Vol A2C 42.50 cm3    LV ES Vol A2C 44.80 cm3    E/E' septal 11.53     LV Mass .2 (A) 67.0 - 162.0 g    LV Mass AL Index 109.0 (A) 43.0 - 95.0 g/m2    LVOT SV 83.4 cm3    Left Atrium Major Axis 3.80 cm    Left Atrium to Aortic Root Ratio 1.27     Main Pulmonary Artery Diameter 2.50 cm    Mitral Valve E Wave Deceleration Time 218.00 ms    MV A Mike 69.40 cm/s    MV E Mike 73.10 cm/s    MV E/A 1.05     Pulmonic Valve Max Velocity 92.00 cm/s    Pulmonic Valve Systolic Peak Instantaneous Gradient 3.00 mmHg    RVIDd 3.37 cm    Tricuspid Valve Max Velocity 211.00 cm/s    Triscuspid Valve Regurgitation Peak Gradient 18.00 mmHg    Tapse 1.80 1.50 - 2.00 cm    Right Atrial Area 4C 21.13 cm2    LA Area 4C 21.60 cm2    Left Atrium Minor Axis 1.98 cm    LOGAN/BSA Pk Mike 1.2 cm2/m2   TROPONIN I    Collection Time: 08/21/21 12:00 PM   Result Value Ref Range    Troponin-I, Qt. <0.05 <0.05 ng/mL         CT HEAD WO CONT   Final Result   No change compared to CT head 5/21/2021. XR CHEST PORT   Final Result   No acute findings. Disposition: home    Discharge Medications:   Current Discharge Medication List      START taking these medications    Details   insulin glargine (LANTUS) 100 unit/mL injection 30 Units by SubCUTAneous route nightly. Qty: 1 Vial, Refills: 0         CONTINUE these medications which have CHANGED    Details   levETIRAcetam (KEPPRA) 750 mg tablet Take 2 Tablets by mouth two (2) times a day.   Qty: 60 Tablet, Refills: 0         CONTINUE these medications which have NOT CHANGED    Details   pioglitazone (ACTOS) 30 mg tablet TAKE 1 TABLET BY MOUTH EVERY DAY  Qty: 30 Tab, Refills: 0    Associated Diagnoses: Uncontrolled type 2 diabetes mellitus with hyperglycemia, with long-term current use of insulin (HCC)      metFORMIN ER (GLUCOPHAGE XR) 750 mg tablet TAKE 1 TABLET BY MOUTH TWICE A DAY  Qty: 180 Tab, Refills: 1    Associated Diagnoses: Type 2 diabetes mellitus with hyperglycemia, with long-term current use of insulin ([facility])      oxybutynin chloride XL (DITROPAN XL) 15 mg CR tablet Take 10 mg by mouth daily. azelastine (OPTIVAR) 0.05 % ophthalmic solution       RESTASIS 0.05 % ophthalmic emulsion       atorvastatin (LIPITOR) 40 mg tablet Take 40 mg by mouth nightly. aspirin delayed-release 81 mg tablet Take  by mouth daily.          STOP taking these medications       insulin glargine (LANTUS SOLOSTAR U-100 INSULIN) 100 unit/mL (3 mL) inpn Comments:   Reason for Stopping:         FAITH PEN NEEDLE 32 gauge x 5/32\" ndle Comments:   Reason for Stopping:         glucose blood VI test strips (TRUE METRIX GLUCOSE TEST STRIP) strip Comments:   Reason for Stopping:         furosemide (LASIX) 40 mg tablet Comments:   Reason for Stopping:         potassium chloride (KAON 10%) 20 mEq/15 mL solution Comments:   Reason for Stopping:               Activity: Activity as tolerated no driving  Diet: Cardiac Diet  Wound Care: None needed    Follow-up Appointments   Procedures    FOLLOW UP VISIT Appointment in: 3 - 5 Days     Standing Status:   Standing     Number of Occurrences:   1     Order Specific Question:   Appointment in     Answer:   3 - 5 Days     40 minutes discharge time  Signed By: Denise Shelley MD     August 21, 2021

## 2021-08-21 NOTE — PROGRESS NOTES
Discharge plan of care/case management plan validated with provider discharge order. I have reviewed discharge instructions with the pt. The pt verbalized understanding.

## 2021-08-21 NOTE — DISCHARGE INSTRUCTIONS
Patient Education        Fainting: Care Instructions  Your Care Instructions     When you faint, or pass out, you lose consciousness for a short time. A brief drop in blood flow to the brain often causes it. When you fall or lie down, more blood flows to your brain and you regain consciousness. Emotional stress, pain, or overheating--especially if you have been standing--can make you faint. In these cases, fainting is usually not serious. But fainting can be a sign of a more serious problem. Your doctor may want you to have more tests to rule out other causes. The treatment you need depends on the reason why you fainted. The doctor has checked you carefully, but problems can develop later. If you notice any problems or new symptoms, get medical treatment right away. Follow-up care is a key part of your treatment and safety. Be sure to make and go to all appointments, and call your doctor if you are having problems. It's also a good idea to know your test results and keep a list of the medicines you take. How can you care for yourself at home? · Drink plenty of fluids to prevent dehydration. If you have kidney, heart, or liver disease and have to limit fluids, talk with your doctor before you increase your fluid intake. When should you call for help? Call 911 anytime you think you may need emergency care. For example, call if:    · You have symptoms of a heart problem. These may include:  ? Chest pain or pressure. ? Severe trouble breathing. ? A fast or irregular heartbeat. ? Lightheadedness or sudden weakness. ? Coughing up pink, foamy mucus. ? Passing out. After you call 911, the  may tell you to chew 1 adult-strength or 2 to 4 low-dose aspirin. Wait for an ambulance. Do not try to drive yourself.     · You have symptoms of a stroke. These may include:  ? Sudden numbness, tingling, weakness, or loss of movement in your face, arm, or leg, especially on only one side of your body.   ? Sudden vision changes. ? Sudden trouble speaking. ? Sudden confusion or trouble understanding simple statements. ? Sudden problems with walking or balance. ? A sudden, severe headache that is different from past headaches.     · You passed out (lost consciousness) again. Watch closely for changes in your health, and be sure to contact your doctor if:    · You do not get better as expected. Where can you learn more? Go to http://www.gray.com/  Enter A848 in the search box to learn more about \"Fainting: Care Instructions. \"  Current as of: February 26, 2020               Content Version: 12.8  © 6416-8940 [employer]. Care instructions adapted under license by [employer] (which disclaims liability or warranty for this information). If you have questions about a medical condition or this instruction, always ask your healthcare professional. [address] any warranty or liability for your use of this information. Patient Education        Lightheadedness or Faintness: Care Instructions  Your Care Instructions  Lightheadedness is a feeling that you are about to faint or \"pass out. \" You do not feel as if you or your surroundings are moving. It is different from vertigo, which is the feeling that you or things around you are spinning or tilting. Lightheadedness usually goes away or gets better when you lie down. If lightheadedness gets worse, it can lead to a fainting spell. It is common to feel lightheaded from time to time. Lightheadedness usually is not caused by a serious problem. It often is caused by a short-lasting drop in blood pressure and blood flow to your head that occurs when you get up too quickly from a seated or lying position. Follow-up care is a key part of your treatment and safety. Be sure to make and go to all appointments, and call your doctor if you are having problems.  It's also a good idea to know your test results and keep a list of the medicines you take. How can you care for yourself at home? · Lie down for 1 or 2 minutes when you feel lightheaded. After lying down, sit up slowly and remain sitting for 1 to 2 minutes before slowly standing up. · Avoid movements, positions, or activities that have made you lightheaded in the past.  · Get plenty of rest, especially if you have a cold or flu, which can cause lightheadedness. · Make sure you drink plenty of fluids, especially if you have a fever or have been sweating. · Do not drive or put yourself and others in danger while you feel lightheaded. When should you call for help? Call 911 anytime you think you may need emergency care. For example, call if:    · You have symptoms of a stroke. These may include:  ? Sudden numbness, tingling, weakness, or loss of movement in your face, arm, or leg, especially on only one side of your body. ? Sudden vision changes. ? Sudden trouble speaking. ? Sudden confusion or trouble understanding simple statements. ? Sudden problems with walking or balance. ? A sudden, severe headache that is different from past headaches.     · You have symptoms of a heart attack. These may include:  ? Chest pain or pressure, or a strange feeling in the chest.  ? Sweating. ? Shortness of breath. ? Nausea or vomiting. ? Pain, pressure, or a strange feeling in the back, neck, jaw, or upper belly or in one or both shoulders or arms. ? Lightheadedness or sudden weakness. ? A fast or irregular heartbeat. After you call 911, the  may tell you to chew 1 adult-strength or 2 to 4 low-dose aspirin. Wait for an ambulance. Do not try to drive yourself. Watch closely for changes in your health, and be sure to contact your doctor if:    · Your lightheadedness gets worse or does not get better with home care. Where can you learn more?   Go to http://www.gray.com/  Enter [id number] in the search box to learn more about \"Lightheadedness or Faintness: Care Instructions. \"  Current as of: February 26, 2020               Content Version: 12.8  © 2006-2021 [employer]. Care instructions adapted under license by [facility] (which disclaims liability or warranty for this information). If you have questions about a medical condition or this instruction, always ask your healthcare professional. [address] any warranty or liability for your use of this information. Patient Education        Learning About Epilepsy  What is epilepsy? Epilepsy is a common condition that causes repeated seizures. Seizures may cause problems with muscle control, movement, speech, vision, or awareness. They usually don't last very long, but they can be scary. Treatment usually works to control and reduce seizures. What causes it? Many things can cause epilepsy. It may develop as a result of a head injury or a condition that causes damage to the brain, like a tumor or stroke. Genes may also play a role. But you don't have to have a family history to develop it. Often doctors don't know what causes epilepsy. What are the symptoms? The main symptom of epilepsy is repeated seizures that happen without warning. There are different kinds of seizures. You may notice strange smells or sounds. You may lose control of your muscles. Or your body may twitch or jerk. Your symptoms will depend on the type of seizure you have. How is it diagnosed? Diagnosing epilepsy can be hard. Your doctor will ask questions to find out what happened just before, during, and right after a seizure. Your doctor will examine you. You'll have some tests, such as an electroencephalogram. This information can help your doctor decide what kind of seizures you have and if you have epilepsy. How is it treated? You can take medicines to control and reduce seizures. Which type you use depends on the type of seizure.  You and your doctor will need to find the right combination, schedule, and dose of medicine. If medicine alone doesn't help, your doctor may suggest a special diet or surgery to help reduce seizures. How can you care for yourself at home? To control your seizures, you need to follow your treatment plan. If you take medicine to control seizures, you must take it exactly as prescribed. The medicine works only if you take the right amount on the schedule your doctor sets up. Following this schedule keeps the right level of medicine in your body. Even missing just a few doses can allow seizures to happen. You might be on a special ketogenic diet. If so, you'll need to follow the diet exactly for it to help prevent seizures. As you follow your treatment plan, also try to figure out and avoid things that may make you more likely to have a seizure. These may include:  · Not getting enough sleep. · Using drugs or alcohol. · Being stressed. · Skipping meals. If you keep having seizures despite treatment, keep a record of them. Note the date, time of day, and any details about the seizure that you can remember. Your doctor can use this information to plan or adjust your medicine or other treatment. The record can also help your doctor find out what kinds of seizures you are having. If you have epilepsy:  · Be sure that any doctor who treats you knows that you have epilepsy. And let the doctor know what medicines you take, if any. · Wear a medical ID bracelet. If you have a seizure or accident that leaves you unconscious or unable to speak for yourself, the bracelet will let those who are treating you know that you have epilepsy. It will also list any medicines you take to control your seizures. That way, you won't be given any medicines that will react badly with those already in your body. · Ask your doctor if it's safe for you to do things like drive or swim. · Create a seizure first-aid plan with your friends and family.  The plan will help them know how to help you. The kind of plan you need can depend on the kind of seizures you have. Your doctor can tell you more about this. Follow-up care is a key part of your treatment and safety. Be sure to make and go to all appointments, and call your doctor if you are having problems. It's also a good idea to know your test results and keep a list of the medicines you take. Where can you learn more? Go to http://www.gray.com/  Enter E175 in the search box to learn more about \"Learning About Epilepsy. \"  Current as of: August 4, 2020               Content Version: 12.8  © 0899-6688 [employer], [employer]. Care instructions adapted under license by [employer] (which disclaims liability or warranty for this information). If you have questions about a medical condition or this instruction, always ask your healthcare professional. [address] any warranty or liability for your use of this information.

## 2021-08-21 NOTE — CONSULTS
NEURO CONSULT      REASON FOR ADMISSION:  Seizures      HISTORY:  Ms. Alec Mcdermott is 80years old with a history of seizures according to her who is consulted to neurology for apparent syncope. Arrival, patient states that she was on the floor seizing. It is not known for how long patient seized. Because she is amnestic for the spell. She was brought to the ER. In the ER, patient had a head CT without contrast that did not show any acute process. She was subsequently admitted to the floor. She has not had a repeat seizure. She is on Keppra 1500 mg p.o. twice daily.           ROS: N/A  General:                     No fever, no chills, no sweats, no generalized weakness, no weight loss/gain,                                       No loss of appetite   Eyes:                           No blurred vision, no eye pain, no loss of vision, no double vision  ENT:                            rhinorrhea, no pharyngitis   Respiratory:               No cough, no sputum production, no SOB, no CHAPA, no wheezing, no pleuritic pain   Cardiology:                No chest pain, no palpitations, no orthopnea, no PND, no edema, no syncope   Gastrointestinal:       No abdominal pain , no N/V, no diarrhea, no dysphagia, no constipation, no bleeding   Genitourinary:           frequency, no urgency, no dysuria, no hematuria, no incontinence   Muskuloskeletal :      No arthralgia, no myalgia, no back pain  Hematology:              No easy bruising, no nose or gum bleeding, no lymphadenopathy   Dermatological:         No rash, no ulceration, no pruritis, no color change / jaundice  Endocrine:                 hot flashes or polydipsia   Neurological:             No headache, no dizziness, no confusion, no focal weakness, no paresthesia,                                      No Speech difficulties, no memory loss, no gait difficulty  Psychological:          No neelings of anxiety, no depression, no agitation      NEURO EXAM:    Mental status: Awake, alert, oriented, nonaphasic. Holds an ordinary conversation. Cranial nerves: Cranial nerves intact    Motor exam: Motor exam intact    Sensory exam: Sensory exam intact    Coordination: Coordination intact    Gait and Station: Gait and station intact    ASSESSMENT:  Seizures likely secondary to subtherapeutic levels of Keppra  Rule out any intercurrent illness. Patient does not admit to any      PLAN:  Patient appears to be stable  She can be discharged on her [address]  Patient states that she has a neurologist  She can follow-up with her neurologist      ALLERGIES:    Allergies   Allergen Reactions    Oxycodone-Acetaminophen Unknown (comments)    Vicodin [Hydrocodone-Acetaminophen] Unknown (comments)       MEDS:      Current Facility-Administered Medications:     aspirin delayed-release tablet 81 mg, 81 mg, Oral, DAILY, Vaibhav Morales MD, 81 mg at 08/21/21 1049    atorvastatin (LIPITOR) tablet 40 mg, 40 mg, Oral, QHS, Vaibhav Morales MD, 40 mg at 08/20/21 2323    ketotifen (ZADITOR) 0.025 % (0.035 %) ophthalmic solution 1 Drop, 1 Drop, Both Eyes, BID, Vaibhav Morales MD, 1 Drop at 08/21/21 0900    . PHARMACY TO SUBSTITUTE PER PROTOCOL (Reordered from: insulin glargine (LANTUS SOLOSTAR U-100 INSULIN) 100 unit/mL (3 mL) inpn), , , Per Protocol, Oni GARCIA MD    oxybutynin chloride XL (DITROPAN XL) tablet 10 mg, 10 mg, Oral, DAILY, Vaibhav Morales MD, 10 mg at 08/21/21 1049    pioglitazone (ACTOS) tablet 30 mg, 30 mg, Oral, DAILY, Vaibhav Morales MD, 30 mg at 08/21/21 1049    peg 400-hypromellose-glycerin 1-0.2-0.2 % opthalmic solution 2 Drop, 2 Drop, Both Eyes, PRN, Vaibhav Morales MD    LORazepam (ATIVAN) 1 mg in 0.9% sodium chloride 5 mL injection, 1 mg, IntraVENous, Q6H PRN, Vaibhav Gilbert MD    sodium chloride (NS) flush 5-40 mL, 5-40 mL, IntraVENous, Q8H, Vaibhav Morales MD, 10 mL at 08/21/21 0615    sodium chloride (NS) flush 5-40 mL, 5-40 mL, IntraVENous, PRN, Fernanda Montilla MD    enoxaparin (LOVENOX) injection 40 mg, 40 mg, SubCUTAneous, Q24H, Vaibhav Morales MD, 40 mg at 08/20/21 1826    glucose chewable tablet 16 g, 4 Tablet, Oral, PRN, Merly GARCIA MD    dextrose (D50W) injection syrg 12.5-25 g, 25-50 mL, IntraVENous, PRN, Vaibhav Morales MD    glucagon (GLUCAGEN) injection 1 mg, 1 mg, IntraMUSCular, PRN, Merly GARCIA MD    0.9% sodium chloride infusion, 50 mL/hr, IntraVENous, CONTINUOUS, Vaibhav Morales MD, Last Rate: 50 mL/hr at 08/20/21 1822, 50 mL/hr at 08/20/21 1822    levETIRAcetam (KEPPRA) tablet 1,500 mg, 1,500 mg, Oral, BID, Xavier Kline MD, 1,500 mg at 08/21/21 1049    LABS:  Recent Results (from the past 24 hour(s))   DRUG SCREEN, URINE    Collection Time: 08/20/21  1:30 PM   Result Value Ref Range    AMPHETAMINES Negative Negative      BARBITURATES Negative Negative      BENZODIAZEPINES Negative Negative      COCAINE Negative Negative      METHADONE Negative Negative      OPIATES Negative Negative      PCP(PHENCYCLIDINE) Negative Negative      THC (TH-CANNABINOL) Negative Negative      Drug screen comment        This test is a screen for drugs of abuse in a medical setting only (i.e., they are unconfirmed results and as such must not be used for non-medical purposes, e.g.,employment testing, legal testing). Due to its inherent nature, false positive (FP) and false negative (FN) results may be obtained. Therefore, if necessary for medical care, recommend confirmation of positive findings by GC/MS.    URINALYSIS W/MICROSCOPIC    Collection Time: 08/20/21  1:30 PM   Result Value Ref Range    Color Yellow/Straw      Appearance Clear Clear      Specific gravity 1.006 1.003 - 1.030      pH (UA) 6.0 5.0 - 8.0      Protein Negative Negative mg/dL    Glucose Negative Negative mg/dL    Ketone Negative Negative mg/dL    Bilirubin Negative Negative      Blood Negative Negative      Urobilinogen 0.1 0.1 - 1.0 EU/dL    Nitrites Negative Negative Leukocyte Esterase Negative Negative      WBC 0-4 0 - 4 /hpf    RBC 0-5 0 - 5 /hpf    Bacteria Negative Negative /hpf   VENOUS BLOOD GAS    Collection Time: 08/20/21  2:20 PM   Result Value Ref Range    VENOUS PH 7.42 (H) 7.31 - 7.41      VENOUS PCO2 43 40 - 50 mmHg    VENOUS PO2 106 (H) 36 - 42 mmHg    VENOUS O2 SATURATION 99 (H) 60 - 80 %    VENOUS BICARBONATE 27 (H) 22 - 26 mmol/L    VENOUS BASE EXCESS 2.4 (H) 0 - 2 mmol/L    O2 METHOD PENDING     Tidal volume PENDING     PRESSURE SUPPORT PENDING     SITE Run repeated     COVID-19 RAPID TEST    Collection Time: 08/20/21  2:40 PM   Result Value Ref Range    Specimen source Please find results under separate order      COVID-19 rapid test Not Detected Not Detected     D DIMER    Collection Time: 08/20/21  5:30 PM   Result Value Ref Range    D DIMER <0.27 <0.50 ug/ml(FEU)   TROPONIN I    Collection Time: 08/20/21  5:30 PM   Result Value Ref Range    Troponin-I, Qt. <0.05 <0.05 ng/mL   TROPONIN I    Collection Time: 08/21/21  1:10 AM   Result Value Ref Range    Troponin-I, Qt. <0.05 <0.05 ng/mL   TSH 3RD GENERATION    Collection Time: 08/21/21  1:10 AM   Result Value Ref Range    TSH 1.25 0.36 - 8.75 uIU/mL   METABOLIC PANEL, COMPREHENSIVE    Collection Time: 08/21/21  1:10 AM   Result Value Ref Range    Sodium 136 136 - 145 mmol/L    Potassium 3.8 3.5 - 5.1 mmol/L    Chloride 103 97 - 108 mmol/L    CO2 25 21 - 32 mmol/L    Anion gap 8 5 - 15 mmol/L    Glucose 186 (H) 65 - 100 mg/dL    BUN 8 6 - 20 mg/dL    Creatinine 0.56 0.55 - 1.02 mg/dL    BUN/Creatinine ratio 14 12 - 20      GFR est AA >60 >60 ml/min/1.73m2    GFR est non-AA >60 >60 ml/min/1.73m2    Calcium 8.7 8.5 - 10.1 mg/dL    Bilirubin, total 0.5 0.2 - 1.0 mg/dL    AST (SGOT) 16 15 - 37 U/L    ALT (SGPT) 29 12 - 78 U/L    Alk.  phosphatase 60 45 - 117 U/L    Protein, total 6.8 6.4 - 8.2 g/dL    Albumin 3.3 (L) 3.5 - 5.0 g/dL    Globulin 3.5 2.0 - 4.0 g/dL    A-G Ratio 0.9 (L) 1.1 - 2.2     ECHO ADULT COMPLETE    Collection Time: 08/21/21 10:16 AM   Result Value Ref Range    Aortic Valve Systolic Peak Velocity 328.75 cm/s    AoV PG 8.00 mmHg    Aortic Valve Area by Continuity of Peak Velocity 2.39 cm2    Ao Root D 3.00 cm    IVSd 1.05 (A) 0.60 - 0.90 cm    IVSd (M-mode) 1.39 (A) cm    LVIDd 5.27 3.90 - 5.30 cm    LVIDd (M-mode) 4.93 cm    LVIDs 3.49 cm    LVIDs (M-mode) 3.32 cm    LVOT d 2.20 cm    LVOT Peak Velocity 89.80 cm/s    LVOT Peak Gradient 3.00 mmHg    LVPWd 1.03 (A) 0.60 - 0.90 cm    LVPWd (M-mode) 1.34 (A) cm    LV E' Septal Velocity 6.34 cm/s    LV ED Vol A2C 146.00 cm3    LV ES Vol A2C 42.50 cm3    LV ES Vol A2C 44.80 cm3    E/E' septal 11.53     LV Mass .2 (A) 67.0 - 162.0 g    LV Mass AL Index 109.0 (A) 43.0 - 95.0 g/m2    LVOT SV 83.4 cm3    Left Atrium Major Axis 3.80 cm    Left Atrium to Aortic Root Ratio 1.27     Main Pulmonary Artery Diameter 2.50 cm    Mitral Valve E Wave Deceleration Time 218.00 ms    MV A Mike 69.40 cm/s    MV E Mike 73.10 cm/s    MV E/A 1.05     Pulmonic Valve Max Velocity 92.00 cm/s    Pulmonic Valve Systolic Peak Instantaneous Gradient 3.00 mmHg    RVIDd 3.37 cm    Tricuspid Valve Max Velocity 211.00 cm/s    Triscuspid Valve Regurgitation Peak Gradient 18.00 mmHg    Tapse 1.80 1.50 - 2.00 cm    Right Atrial Area 4C 21.13 cm2    LA Area 4C 21.60 cm2    Left Atrium Minor Axis 1.98 cm    LOGAN/BSA Pk Mike 1.2 cm2/m2   TROPONIN I    Collection Time: 08/21/21 12:00 PM   Result Value Ref Range    Troponin-I, Qt. <0.05 <0.05 ng/mL       Visit Vitals  BP (!) 150/64 (BP 1 Location: Right upper arm, BP Patient Position: At rest)   Pulse 64   Temp 97.8 °F (36.6 °C)   Resp 14   Ht 5' 2\" (1.575 m)   Wt 91.6 kg (202 lb)   SpO2 96%   BMI 36.95 kg/m²       Imaging:  CT HEAD WO CONT   Final Result   No change compared to CT head 5/21/2021. XR CHEST PORT   Final Result   No acute findings.

## 2021-08-22 LAB
ATRIAL RATE: 62 BPM
CALCULATED P AXIS, ECG09: 32 DEGREES
CALCULATED R AXIS, ECG10: 10 DEGREES
CALCULATED T AXIS, ECG11: 24 DEGREES
DIAGNOSIS, 93000: NORMAL
P-R INTERVAL, ECG05: 174 MS
Q-T INTERVAL, ECG07: 414 MS
QRS DURATION, ECG06: 88 MS
QTC CALCULATION (BEZET), ECG08: 420 MS
VENTRICULAR RATE, ECG03: 62 BPM

## 2021-08-25 LAB — LEVETIRACETAM SERPL-MCNC: 36.8 UG/ML (ref 10–40)

## 2022-01-11 ENCOUNTER — OFFICE VISIT (OUTPATIENT)
Dept: UROLOGY | Age: 72
End: 2022-01-11
Payer: MEDICARE

## 2022-01-11 VITALS
DIASTOLIC BLOOD PRESSURE: 73 MMHG | OXYGEN SATURATION: 98 % | TEMPERATURE: 96.8 F | WEIGHT: 217 LBS | HEART RATE: 66 BPM | SYSTOLIC BLOOD PRESSURE: 154 MMHG | HEIGHT: 62 IN | RESPIRATION RATE: 22 BRPM | BODY MASS INDEX: 39.93 KG/M2

## 2022-01-11 DIAGNOSIS — E11.65 TYPE 2 DIABETES MELLITUS WITH HYPERGLYCEMIA, WITH LONG-TERM CURRENT USE OF INSULIN (HCC): ICD-10-CM

## 2022-01-11 DIAGNOSIS — Z79.4 TYPE 2 DIABETES MELLITUS WITH HYPERGLYCEMIA, WITH LONG-TERM CURRENT USE OF INSULIN (HCC): ICD-10-CM

## 2022-01-11 DIAGNOSIS — N39.0 RECURRENT UTI: Primary | ICD-10-CM

## 2022-01-11 DIAGNOSIS — N39.41 URGE INCONTINENCE: ICD-10-CM

## 2022-01-11 PROBLEM — R81 GLUCOSURIA: Status: ACTIVE | Noted: 2022-01-11

## 2022-01-11 LAB
BILIRUB UR QL STRIP: NEGATIVE
GLUCOSE UR-MCNC: NORMAL MG/DL
KETONES P FAST UR STRIP-MCNC: NEGATIVE MG/DL
PH UR STRIP: 6.5 [PH] (ref 4.6–8)
PROT UR QL STRIP: NEGATIVE
SP GR UR STRIP: 1.02 (ref 1–1.03)
UA UROBILINOGEN AMB POC: NORMAL (ref 0.2–1)
URINALYSIS CLARITY POC: CLEAR
URINALYSIS COLOR POC: YELLOW
URINE BLOOD POC: NEGATIVE
URINE LEUKOCYTES POC: NORMAL
URINE NITRITES POC: NEGATIVE

## 2022-01-11 PROCEDURE — 99204 OFFICE O/P NEW MOD 45 MIN: CPT | Performed by: UROLOGY

## 2022-01-11 PROCEDURE — G8400 PT W/DXA NO RESULTS DOC: HCPCS | Performed by: UROLOGY

## 2022-01-11 PROCEDURE — G8754 DIAS BP LESS 90: HCPCS | Performed by: UROLOGY

## 2022-01-11 PROCEDURE — G8419 CALC BMI OUT NRM PARAM NOF/U: HCPCS | Performed by: UROLOGY

## 2022-01-11 PROCEDURE — G8536 NO DOC ELDER MAL SCRN: HCPCS | Performed by: UROLOGY

## 2022-01-11 PROCEDURE — G8428 CUR MEDS NOT DOCUMENT: HCPCS | Performed by: UROLOGY

## 2022-01-11 PROCEDURE — G8432 DEP SCR NOT DOC, RNG: HCPCS | Performed by: UROLOGY

## 2022-01-11 PROCEDURE — 81003 URINALYSIS AUTO W/O SCOPE: CPT | Performed by: UROLOGY

## 2022-01-11 PROCEDURE — G8753 SYS BP > OR = 140: HCPCS | Performed by: UROLOGY

## 2022-01-11 RX ORDER — LISINOPRIL 5 MG/1
TABLET ORAL
COMMUNITY
Start: 2021-12-08

## 2022-01-11 RX ORDER — MELOXICAM 15 MG/1
TABLET ORAL
COMMUNITY
Start: 2021-12-02

## 2022-01-11 RX ORDER — FAMOTIDINE 20 MG/1
TABLET, FILM COATED ORAL
COMMUNITY
Start: 2022-01-07

## 2022-01-11 RX ORDER — LEVETIRACETAM 1000 MG/1
TABLET ORAL
COMMUNITY
Start: 2021-11-15

## 2022-01-11 NOTE — ASSESSMENT & PLAN NOTE
She thinks her sugars run in the 150's. She is on insulin, actos and metformin. She does not seem very educated about her disease and regimen. Glucosuria can lead to urinary infections, polyuria and overactivity.   She will follow up with her primary doctor and a specialist.

## 2022-01-11 NOTE — ASSESSMENT & PLAN NOTE
Chronic. Unclear if related to UTIs and diabetes. I recommend evaluation with cystoscopy/ CMG/ uroflow/ PVR. I explained the indication, risks and benefits and she wishes to proceed.

## 2022-01-11 NOTE — PROGRESS NOTES
1. Have you been to the ER, urgent care clinic since your last visit? Hospitalized since your last visit? No    2. Have you seen or consulted any other health care providers outside of the [address] [place] [address] since your last visit? Include any pap smears or colon screening.  No  Chief Complaint   Patient presents with    New Patient    Recurrent UTI     Visit Vitals  BP (!) 154/73 (BP 1 Location: Right arm, BP Patient Position: Sitting, BP Cuff Size: Adult)   Pulse 66   Temp 96.8 °F (36 °C) (Temporal)   Resp 22   Ht 5' 2\" (1.575 m)   Wt 217 lb (98.4 kg)   SpO2 98%   BMI 39.69 kg/m²

## 2022-01-11 NOTE — LETTER
1/11/2022    Patient: Ramez Braun   YOB: 1950   Date of Visit: 1/11/2022     Smith Motley MD  [facility] [address]  Via Fax: [phone]    Dear Smith Motley MD,      Thank you for referring Ms. Ramez Braun to [facility] for evaluation. My notes for this consultation are attached. If you have questions, please do not hesitate to call me. I look forward to following your patient along with you.       Sincerely,    Amandeep Ridley MD

## 2022-01-11 NOTE — PROGRESS NOTES
HISTORY OF PRESENT ILLNESS  Kaylee Lim is a 80 y.o. female. Chief Complaint   Patient presents with    New Patient    Recurrent UTI     Past Medical History:  PMHx (including negatives):  has a past medical history of Arthritis, Breast cancer ([address] Diabetes ([address] Hyperlipidemia, Hypertension, Seizure ([address] Seizures ([address] and Stroke ([address] PSurgHx:  has a past surgical history that includes hx cholecystectomy (2014) and hx bilateral mastectomy (Left). PSocHx:  reports that she has quit smoking. Her smoking use included cigarettes. She has never used smokeless tobacco. She reports previous alcohol use. She reports that she does not use drugs. She was seeing a urologist at [facility] in [place], [place], Dr. Billy Braun. She say him for several years. She is unsure of dates or durations. It is tought to get more accurate information than as follows. She has infections and incontinence. She takes oxybutynin. She wears pullups, 3-4x per day. She has problems day and night. UA dip today with glucose and leukocytes. She provided a very small specimen. She is frustrated because she has not been able to communicate with his office. She finally got oxybutynin. She is on Ditropan XL 15mg. She thinks she has trouble emptying. She urgency and urge incontinence. She voids hourly at night. She denies dysuria. Chronic Conditions Addressed Today     1. Type 2 diabetes mellitus with hyperglycemia, with long-term current use of insulin ([facility])     Current Assessment & Plan      She thinks her sugars run in the 150's. She is on insulin, actos and metformin. She does not seem very educated about her disease and regimen. Glucosuria can lead to urinary infections, polyuria and overactivity. She will follow up with her primary doctor and a specialist.           Relevant Medications     lisinopriL (PRINIVIL, ZESTRIL) 5 mg tablet    2.  Recurrent UTI - Primary     Current Assessment & Plan Unclear if she has recurrent or persistent infection or even contamination from incontinence. We can obtain a catheterized sample at cystoscopy. Relevant Medications     lisinopriL (PRINIVIL, ZESTRIL) 5 mg tablet     Other Relevant Orders     CULTURE, URINE     AMB POC URINALYSIS DIP STICK AUTO W/O MICRO    3. Urge incontinence     Current Assessment & Plan      Chronic. Unclear if related to UTIs and diabetes. I recommend evaluation with cystoscopy/ CMG/ uroflow/ PVR. I explained the indication, risks and benefits and she wishes to proceed. Patient denies the symptoms of COVID-19 per routine screening guidelines. ROS    Past Medical History:  PMHx (including negatives):  has a past medical history of Arthritis, Breast cancer ([address] Diabetes ([address] Hyperlipidemia, Hypertension, Seizure ([address] Seizures ([address] and Stroke ([address] PSurgHx:  has a past surgical history that includes hx cholecystectomy (2014) and hx bilateral mastectomy (Left). PSocHx:  reports that she has quit smoking. Her smoking use included cigarettes. She has never used smokeless tobacco. She reports previous alcohol use. She reports that she does not use drugs. Physical Exam  Vitals reviewed. Constitutional:       General: She is not in acute distress. Appearance: Normal appearance. She is obese. She is not ill-appearing, toxic-appearing or diaphoretic. HENT:      Head: Normocephalic and atraumatic. Nose: Nose normal.   Eyes:      Conjunctiva/sclera: Conjunctivae normal.      Pupils: Pupils are equal, round, and reactive to light. Pulmonary:      Effort: Pulmonary effort is normal. No respiratory distress. Breath sounds: Normal breath sounds. Musculoskeletal:      Cervical back: Normal range of motion. Neurological:      General: No focal deficit present. Mental Status: She is alert and oriented to person, place, and time.    Psychiatric:         Mood and Affect: Mood normal. ASSESSMENT and PLAN  Diagnoses and all orders for this visit:    1. Recurrent UTI  Assessment & Plan:   Unclear if she has recurrent or persistent infection or even contamination from incontinence. We can obtain a catheterized sample at cystoscopy. Orders:  -     CULTURE, URINE  -     AMB POC URINALYSIS DIP STICK AUTO W/O MICRO    2. Type 2 diabetes mellitus with hyperglycemia, with long-term current use of insulin ([facility])  Assessment & Plan:  She thinks her sugars run in the 150's. She is on insulin, actos and metformin. She does not seem very educated about her disease and regimen. Glucosuria can lead to urinary infections, polyuria and overactivity. She will follow up with her primary doctor and a specialist.       3. Urge incontinence  Assessment & Plan:  Chronic. Unclear if related to UTIs and diabetes. I recommend evaluation with cystoscopy/ CMG/ uroflow/ PVR. I explained the indication, risks and benefits and she wishes to proceed. Follow-up and Dispositions    · Return for cystoscopy/ CMG.          Sara Farris MD

## 2022-01-11 NOTE — ASSESSMENT & PLAN NOTE
Unclear if she has recurrent or persistent infection or even contamination from incontinence. We can obtain a catheterized sample at cystoscopy.

## 2022-01-14 LAB
BACTERIA UR CULT: NORMAL
BACTERIA UR CULT: NORMAL

## 2022-01-23 ENCOUNTER — HOSPITAL ENCOUNTER (EMERGENCY)
Age: 72
Discharge: HOME | End: 2022-01-23
Admitting: FAMILY MEDICINE
Payer: MEDICARE

## 2022-01-23 VITALS
DIASTOLIC BLOOD PRESSURE: 64 MMHG | BODY MASS INDEX: 38.45 KG/M2 | WEIGHT: 217 LBS | HEIGHT: 63 IN | OXYGEN SATURATION: 100 % | SYSTOLIC BLOOD PRESSURE: 137 MMHG | HEART RATE: 68 BPM | TEMPERATURE: 98.1 F | RESPIRATION RATE: 18 BRPM

## 2022-01-23 DIAGNOSIS — R30.0 DYSURIA: ICD-10-CM

## 2022-01-23 DIAGNOSIS — R42 DIZZINESS: ICD-10-CM

## 2022-01-23 DIAGNOSIS — R73.9 HYPERGLYCEMIA: Primary | ICD-10-CM

## 2022-01-23 LAB
ALBUMIN SERPL-MCNC: 3.2 G/DL (ref 3.5–5)
ALBUMIN/GLOB SERPL: 0.9 {RATIO} (ref 1.1–2.2)
ALP SERPL-CCNC: 131 U/L (ref 45–117)
ALT SERPL-CCNC: 28 U/L (ref 12–78)
ANION GAP SERPL CALC-SCNC: 5 MMOL/L (ref 5–15)
APPEARANCE UR: CLEAR
AST SERPL W P-5'-P-CCNC: 17 U/L (ref 15–37)
BACTERIA URNS QL MICRO: NEGATIVE /HPF
BACTERIA URNS QL MICRO: NEGATIVE /HPF
BASOPHILS # BLD: 0 K/UL (ref 0–0.1)
BASOPHILS NFR BLD: 0 % (ref 0–1)
BILIRUB SERPL-MCNC: 0.4 MG/DL (ref 0.2–1)
BILIRUB UR QL: NEGATIVE
BUN SERPL-MCNC: 12 MG/DL (ref 6–20)
BUN/CREAT SERPL: 13 (ref 12–20)
CA-I BLD-MCNC: 9.1 MG/DL (ref 8.5–10.1)
CHLORIDE SERPL-SCNC: 102 MMOL/L (ref 97–108)
CO2 SERPL-SCNC: 26 MMOL/L (ref 21–32)
COLOR UR: ABNORMAL
CREAT SERPL-MCNC: 0.93 MG/DL (ref 0.55–1.02)
DIFFERENTIAL METHOD BLD: NORMAL
EOSINOPHIL # BLD: 0.1 K/UL (ref 0–0.4)
EOSINOPHIL NFR BLD: 1 % (ref 0–7)
ERYTHROCYTE [DISTWIDTH] IN BLOOD BY AUTOMATED COUNT: 11.6 % (ref 11.5–14.5)
GLOBULIN SER CALC-MCNC: 3.6 G/DL (ref 2–4)
GLUCOSE BLD STRIP.AUTO-MCNC: 345 MG/DL (ref 65–117)
GLUCOSE BLD STRIP.AUTO-MCNC: 363 MG/DL (ref 65–117)
GLUCOSE SERPL-MCNC: 467 MG/DL (ref 65–100)
GLUCOSE UR STRIP.AUTO-MCNC: >300 MG/DL
HCT VFR BLD AUTO: 37 % (ref 35–47)
HGB BLD-MCNC: 12.4 G/DL (ref 11.5–16)
HGB UR QL STRIP: NEGATIVE
IMM GRANULOCYTES # BLD AUTO: 0 K/UL (ref 0–0.04)
IMM GRANULOCYTES NFR BLD AUTO: 0 % (ref 0–0.5)
KETONES UR QL STRIP.AUTO: NEGATIVE MG/DL
LEUKOCYTE ESTERASE UR QL STRIP.AUTO: ABNORMAL
LYMPHOCYTES # BLD: 2 K/UL (ref 0.8–3.5)
LYMPHOCYTES NFR BLD: 30 % (ref 12–49)
MCH RBC QN AUTO: 32.2 PG (ref 26–34)
MCHC RBC AUTO-ENTMCNC: 33.5 G/DL (ref 30–36.5)
MCV RBC AUTO: 96.1 FL (ref 80–99)
MONOCYTES # BLD: 0.6 K/UL (ref 0–1)
MONOCYTES NFR BLD: 9 % (ref 5–13)
NEUTS SEG # BLD: 3.9 K/UL (ref 1.8–8)
NEUTS SEG NFR BLD: 60 % (ref 32–75)
NITRITE UR QL STRIP.AUTO: NEGATIVE
NRBC # BLD: 0 K/UL (ref 0–0.01)
NRBC BLD-RTO: 0 PER 100 WBC
PERFORMED BY, TECHID: ABNORMAL
PERFORMED BY, TECHID: ABNORMAL
PH UR STRIP: 7 [PH] (ref 5–8)
PLATELET # BLD AUTO: 165 K/UL (ref 150–400)
PMV BLD AUTO: 11.9 FL (ref 8.9–12.9)
POTASSIUM SERPL-SCNC: 4.2 MMOL/L (ref 3.5–5.1)
PROT SERPL-MCNC: 6.8 G/DL (ref 6.4–8.2)
PROT UR STRIP-MCNC: NEGATIVE MG/DL
RBC # BLD AUTO: 3.85 M/UL (ref 3.8–5.2)
RBC #/AREA URNS HPF: ABNORMAL /HPF (ref 0–5)
RBC #/AREA URNS HPF: NORMAL /HPF (ref 0–5)
SODIUM SERPL-SCNC: 133 MMOL/L (ref 136–145)
SP GR UR REFRACTOMETRY: 1.01 (ref 1–1.03)
UROBILINOGEN UR QL STRIP.AUTO: 0.1 EU/DL (ref 0.1–1)
WBC # BLD AUTO: 6.6 K/UL (ref 3.6–11)
WBC URNS QL MICRO: ABNORMAL /HPF (ref 0–4)
WBC URNS QL MICRO: NORMAL /HPF (ref 0–4)

## 2022-01-23 PROCEDURE — 74011636637 HC RX REV CODE- 636/637: Performed by: NURSE PRACTITIONER

## 2022-01-23 PROCEDURE — 81001 URINALYSIS AUTO W/SCOPE: CPT

## 2022-01-23 PROCEDURE — 82962 GLUCOSE BLOOD TEST: CPT

## 2022-01-23 PROCEDURE — 36415 COLL VENOUS BLD VENIPUNCTURE: CPT

## 2022-01-23 PROCEDURE — 74011250636 HC RX REV CODE- 250/636: Performed by: NURSE PRACTITIONER

## 2022-01-23 PROCEDURE — 80053 COMPREHEN METABOLIC PANEL: CPT

## 2022-01-23 PROCEDURE — 85025 COMPLETE CBC W/AUTO DIFF WBC: CPT

## 2022-01-23 PROCEDURE — 99284 EMERGENCY DEPT VISIT MOD MDM: CPT

## 2022-01-23 RX ORDER — CEPHALEXIN 500 MG/1
500 CAPSULE ORAL 3 TIMES DAILY
Qty: 21 CAPSULE | Refills: 0 | Status: SHIPPED | OUTPATIENT
Start: 2022-01-23 | End: 2022-01-30

## 2022-01-23 RX ORDER — INSULIN LISPRO 100 [IU]/ML
10 INJECTION, SOLUTION INTRAVENOUS; SUBCUTANEOUS ONCE
Status: COMPLETED | OUTPATIENT
Start: 2022-01-23 | End: 2022-01-23

## 2022-01-23 RX ADMIN — INSULIN LISPRO 10 UNITS: 100 INJECTION, SOLUTION INTRAVENOUS; SUBCUTANEOUS at 01:26

## 2022-01-23 RX ADMIN — SODIUM CHLORIDE 1000 ML: 9 INJECTION, SOLUTION INTRAVENOUS at 01:26

## 2022-01-23 RX ADMIN — INSULIN LISPRO 10 UNITS: 100 INJECTION, SOLUTION INTRAVENOUS; SUBCUTANEOUS at 04:11

## 2022-01-23 NOTE — ED TRIAGE NOTES
Patient went to stand up and got lightheaded and fell down.  Patient is hypertensive and hyperglycemic EMS started IV fluids

## 2022-01-23 NOTE — Clinical Note
[facility] [facility] EMERGENCY DEPT  [place] [id number]  [phone]    Work/School Note    Date: 1/23/2022    To Whom It May concern:    Matt [place] was seen and treated today in the emergency room by the following provider(s):  Nurse Practitioner: Gumaro Joe NP. Matt [place] is excused from work/school on 01/23/22 and 01/24/22. She is medically clear to return to work/school on 1/25/2022.        Sincerely,          Rojelio Berry NP

## 2022-01-23 NOTE — ED NOTES
Norma - Patient discharge by Navjot Ceja NP - pt sent to the front lobby, with strong and steady gait -  Discharge information / home RX / and reasons to return to the ED were reviewed by the doctor.

## 2022-01-23 NOTE — ED PROVIDER NOTES
EMERGENCY DEPARTMENT HISTORY AND PHYSICAL EXAM      Date: 1/23/2022  Patient Name: Perez Garcia      History of Presenting Illness     Chief Complaint   Patient presents with    High Blood Sugar       History Provided By: Patient    HPI: Perez Garcia, 80 y.o. female with a past medical history significant diabetes, hypertension, hyperlipidemia, obesity, stroke, osteoarthritis, malignancy and seizure presents to the ED with cc of dazed feeling at home. Didn't feel well. Denies headache. Knew what was going on, but just felt off. Like she was in a fog. Denies any chest pain, Blood sugars usually 160's    There are no other complaints, changes, or physical findings at this time. PCP: Gianni Flores MD    Current Outpatient Medications   Medication Sig Dispense Refill    famotidine (PEPCID) 20 mg tablet       lisinopriL (PRINIVIL, ZESTRIL) 5 mg tablet       levETIRAcetam 1,000 mg tablet       meloxicam (MOBIC) 15 mg tablet       insulin glargine (LANTUS) 100 unit/mL injection 30 Units by SubCUTAneous route nightly. 1 Vial 0    pioglitazone (ACTOS) 30 mg tablet TAKE 1 TABLET BY MOUTH EVERY DAY 30 Tab 0    oxybutynin chloride XL (DITROPAN XL) 15 mg CR tablet Take 10 mg by mouth daily.  atorvastatin (LIPITOR) 40 mg tablet Take 40 mg by mouth nightly.  aspirin delayed-release 81 mg tablet Take  by mouth daily.          Past History     Past Medical History:  Past Medical History:   Diagnosis Date    Arthritis     [facility]     Diabetes ([facility]     Hyperlipidemia     Hypertension     Seizure ([facility]     Seizures ([facility]     Stroke [facility])        Past Surgical History:  Past Surgical History:   Procedure Laterality Date    HX BILATERAL MASTECTOMY Left     HX CHOLECYSTECTOMY  2014    HX UROLOGICAL  02/04/2022    CYSTOSCOPY        Family History:  Family History   Problem Relation Age of Onset    Cancer Mother         stomach cancer    Diabetes Mother     Hypertension Mother    [employer] Diabetes Sister     Hypertension Sister     Cancer Sister     Cancer Other        Social History:  Social History     Tobacco Use    Smoking status: Former Smoker     Types: Cigarettes    Smokeless tobacco: Never Used   Vaping Use    Vaping Use: Never used   Substance Use Topics    Alcohol use: Not Currently    Drug use: Never       Allergies: Allergies   Allergen Reactions    Oxycodone-Acetaminophen Unknown (comments)    Vicodin [Hydrocodone-Acetaminophen] Unknown (comments)         Review of Systems     Review of Systems   Constitutional: Positive for activity change and fatigue. HENT: Negative. Respiratory: Negative. Negative for chest tightness and wheezing. Cardiovascular: Negative. Negative for chest pain and palpitations. Gastrointestinal: Negative. Negative for abdominal pain. Endocrine: Positive for polydipsia, polyphagia and polyuria. Genitourinary: Positive for dysuria, frequency and urgency. Musculoskeletal: Negative. Negative for back pain. Neurological: Positive for weakness and light-headedness. Negative for facial asymmetry and headaches. Physical Exam     Physical Exam  Vitals and nursing note reviewed. Constitutional:       Appearance: Normal appearance. She is normal weight. HENT:      Head: Normocephalic and atraumatic. Eyes:      Extraocular Movements: Extraocular movements intact. Pupils: Pupils are equal, round, and reactive to light. Cardiovascular:      Rate and Rhythm: Normal rate and regular rhythm. Pulses: Normal pulses. Heart sounds: Normal heart sounds. Pulmonary:      Effort: Pulmonary effort is normal. No respiratory distress. Breath sounds: Normal breath sounds. No rhonchi. Chest:      Chest wall: No tenderness. Abdominal:      General: Abdomen is flat. Tenderness: There is no abdominal tenderness. There is no right CVA tenderness or left CVA tenderness.    Musculoskeletal:         General: Normal range of motion. Cervical back: Normal range of motion and neck supple. Skin:     General: Skin is warm and dry. Capillary Refill: Capillary refill takes less than 2 seconds. Neurological:      General: No focal deficit present. Mental Status: She is alert and oriented to person, place, and time. Cranial Nerves: No cranial nerve deficit. Motor: No weakness. Coordination: Coordination normal.   Psychiatric:         Mood and Affect: Mood normal.         Behavior: Behavior normal.         Lab and Diagnostic Study Results     Labs -     No results found for this or any previous visit (from the past 12 hour(s)). Radiologic Studies -   [unfilled]  CT Results  (Last 48 hours)    None        CXR Results  (Last 48 hours)    None          Medical Decision Making and ED Course   - I am the first and primary provider for this patient AND AM THE PRIMARY PROVIDER OF RECORD. - I reviewed the vital signs, available nursing notes, past medical history, past surgical history, family history and social history. - Initial assessment performed. The patients presenting problems have been discussed, and the staff are in agreement with the care plan formulated and outlined with them. I have encouraged them to ask questions as they arise throughout their visit. Vital Signs-Reviewed the patient's vital signs. No data found.         Records Reviewed: Nursing Notes and Old Medical Records    The patient presents with altered mental status with a differential diagnosis of  UTI and hyperglycemial     ED Course:              Provider Notes (Medical Decision Making):     MDM  Number of Diagnoses or Management Options  Dizziness: new, needed workup  Dysuria: new, needed workup  Hyperglycemia: new, needed workup     Amount and/or Complexity of Data Reviewed  Clinical lab tests: ordered and reviewed  Tests in the radiology section of CPT®: ordered and reviewed    Risk of Complications, Morbidity, and/or Mortality  Presenting problems: moderate  Diagnostic procedures: moderate  Management options: moderate  General comments: Iv hydration,   Treatment for hyperglycemia               Consultations:       Consultations: - NONE        Procedures and Critical Care       Performed by: Rojelio Berry NP  PROCEDURES:none  Procedures                     Disposition     Disposition: Condition improved  DC- Adult Discharges: All of the diagnostic tests were reviewed and questions answered. Diagnosis, care plan and treatment options were discussed. The patient understands the instructions and will follow up as directed. The patients results have been reviewed with them. They have been counseled regarding their diagnosis. The patient verbally convey understanding and agreement of the signs, symptoms, diagnosis, treatment and prognosis and additionally agrees to follow up as recommended with their PCP in 24 - 48 hours. They also agree with the care-plan and convey that all of their questions have been answered. I have also put together some discharge instructions for them that include: 1) educational information regarding their diagnosis, 2) how to care for their diagnosis at home, as well a 3) list of reasons why they would want to return to the ED prior to their follow-up appointment, should their condition change. DC-The patient was given verbal follow-up and diabetic care instructions    Discharged    Remove if not discharged  DISCHARGE PLAN:  1. Current Discharge Medication List      CONTINUE these medications which have NOT CHANGED    Details   famotidine (PEPCID) 20 mg tablet       lisinopriL (PRINIVIL, ZESTRIL) 5 mg tablet       levETIRAcetam 1,000 mg tablet       meloxicam (MOBIC) 15 mg tablet       insulin glargine (LANTUS) 100 unit/mL injection 30 Units by SubCUTAneous route nightly.   Qty: 1 Vial, Refills: 0      pioglitazone (ACTOS) 30 mg tablet TAKE 1 TABLET BY MOUTH EVERY DAY  Qty: 30 Tab, Refills: 0 Associated Diagnoses: Uncontrolled type 2 diabetes mellitus with hyperglycemia, with long-term current use of insulin ([facility])      metFORMIN ER (GLUCOPHAGE XR) 750 mg tablet TAKE 1 TABLET BY MOUTH TWICE A DAY  Qty: 180 Tab, Refills: 1    Associated Diagnoses: Type 2 diabetes mellitus with hyperglycemia, with long-term current use of insulin (HCC)      oxybutynin chloride XL (DITROPAN XL) 15 mg CR tablet Take 10 mg by mouth daily. azelastine (OPTIVAR) 0.05 % ophthalmic solution       RESTASIS 0.05 % ophthalmic emulsion       atorvastatin (LIPITOR) 40 mg tablet Take 40 mg by mouth nightly. aspirin delayed-release 81 mg tablet Take  by mouth daily. 2.   Follow-up Information     Follow up With Specialties Details Why Contact Info    Kandi Lockett MD Family Medicine In 2 days  [address] [place] [address]  [phone]          3. Return to ED if worse   4. Discharge Medication List as of 1/23/2022  3:18 AM      START taking these medications    Details   cephALEXin (Keflex) 500 mg capsule Take 1 Capsule by mouth three (3) times daily for 7 days. , Normal, Disp-21 Capsule, R-0         CONTINUE these medications which have NOT CHANGED    Details   famotidine (PEPCID) 20 mg tablet Historical Med      lisinopriL (PRINIVIL, ZESTRIL) 5 mg tablet Historical Med      levETIRAcetam 1,000 mg tablet Historical Med      meloxicam (MOBIC) 15 mg tablet Historical Med      insulin glargine (LANTUS) 100 unit/mL injection 30 Units by SubCUTAneous route nightly., Normal, Disp-1 Vial, R-0      pioglitazone (ACTOS) 30 mg tablet TAKE 1 TABLET BY MOUTH EVERY DAY, Normal, Disp-30 Tab, R-0      metFORMIN ER (GLUCOPHAGE XR) 750 mg tablet TAKE 1 TABLET BY MOUTH TWICE A DAY, Normal, Disp-180 Tab, R-1      oxybutynin chloride XL (DITROPAN XL) 15 mg CR tablet Take 10 mg by mouth daily. , Historical Med      azelastine (OPTIVAR) 0.05 % ophthalmic solution Historical Med      RESTASIS 0.05 % ophthalmic emulsion Historical Med, GLADYS      atorvastatin (LIPITOR) 40 mg tablet Take 40 mg by mouth nightly., Historical Med      aspirin delayed-release 81 mg tablet Take  by mouth daily. , Historical Med             Diagnosis     Clinical Impression:   1. Hyperglycemia    2. Dysuria    3. Dizziness        Attestations:    Renee Flores NP    Please note that this dictation was completed with ALTILIA, the computer voice recognition software. Quite often unanticipated grammatical, syntax, homophones, and other interpretive errors are inadvertently transcribed by the computer software. Please disregard these errors. Please excuse any errors that have escaped final proofreading. Thank you.

## 2022-02-04 ENCOUNTER — OFFICE VISIT (OUTPATIENT)
Dept: UROLOGY | Age: 72
End: 2022-02-04
Payer: MEDICARE

## 2022-02-04 VITALS — BODY MASS INDEX: 38.45 KG/M2 | HEIGHT: 63 IN | WEIGHT: 217 LBS

## 2022-02-04 DIAGNOSIS — E11.65 TYPE 2 DIABETES MELLITUS WITH HYPERGLYCEMIA, WITH LONG-TERM CURRENT USE OF INSULIN (HCC): ICD-10-CM

## 2022-02-04 DIAGNOSIS — Z79.4 TYPE 2 DIABETES MELLITUS WITH HYPERGLYCEMIA, WITH LONG-TERM CURRENT USE OF INSULIN (HCC): ICD-10-CM

## 2022-02-04 DIAGNOSIS — R81 GLUCOSURIA: ICD-10-CM

## 2022-02-04 DIAGNOSIS — N39.41 URGE INCONTINENCE: Primary | ICD-10-CM

## 2022-02-04 DIAGNOSIS — N39.0 RECURRENT UTI: ICD-10-CM

## 2022-02-04 LAB
AVG FLOW RATE POC: 9 ML/SECONDS
BILIRUB UR QL: NEGATIVE
FLOW TIME POC: 32 SECONDS
GLUCOSE UR-MCNC: NEGATIVE MG/DL
KETONES P FAST UR STRIP-MCNC: NEGATIVE MG/DL
PEAK FLOW: 16 ML/SECONDS
PH UR STRIP: 6.5 [PH] (ref 4.6–8)
PROT UR QL STRIP: NEGATIVE
PVR POC: 17 CC
SP GR UR STRIP: 1.01 (ref 1–1.03)
TIME TO PEAK: 6 SECONDS
TOTAL VOLUME POC: 292 ML
UA UROBILINOGEN AMB POC: NORMAL (ref 0.2–1)
URINALYSIS CLARITY POC: CLEAR
URINALYSIS COLOR POC: YELLOW
URINE BLOOD POC: NEGATIVE
URINE LEUKOCYTES POC: NEGATIVE
URINE NITRITES POC: NEGATIVE
VOIDING TIME POC: 32 SECONDS

## 2022-02-04 PROCEDURE — G8417 CALC BMI ABV UP PARAM F/U: HCPCS | Performed by: UROLOGY

## 2022-02-04 PROCEDURE — 51725 SIMPLE CYSTOMETROGRAM: CPT | Performed by: UROLOGY

## 2022-02-04 PROCEDURE — G8756 NO BP MEASURE DOC: HCPCS | Performed by: UROLOGY

## 2022-02-04 PROCEDURE — 51741 ELECTRO-UROFLOWMETRY FIRST: CPT | Performed by: UROLOGY

## 2022-02-04 PROCEDURE — G8432 DEP SCR NOT DOC, RNG: HCPCS | Performed by: UROLOGY

## 2022-02-04 PROCEDURE — 99214 OFFICE O/P EST MOD 30 MIN: CPT | Performed by: UROLOGY

## 2022-02-04 PROCEDURE — 81003 URINALYSIS AUTO W/O SCOPE: CPT | Performed by: UROLOGY

## 2022-02-04 PROCEDURE — 51798 US URINE CAPACITY MEASURE: CPT | Performed by: UROLOGY

## 2022-02-04 PROCEDURE — 52000 CYSTOURETHROSCOPY: CPT | Performed by: UROLOGY

## 2022-02-04 PROCEDURE — G8427 DOCREV CUR MEDS BY ELIG CLIN: HCPCS | Performed by: UROLOGY

## 2022-02-04 PROCEDURE — G8536 NO DOC ELDER MAL SCRN: HCPCS | Performed by: UROLOGY

## 2022-02-04 PROCEDURE — G8400 PT W/DXA NO RESULTS DOC: HCPCS | Performed by: UROLOGY

## 2022-02-04 NOTE — ASSESSMENT & PLAN NOTE
Poorly controlled diabetes likely precipitates her urinary symptoms. She was recently hospitalized for high glucose greater than 500. She is advised to continue to improve her diabetic control.

## 2022-02-04 NOTE — LETTER
2/4/2022    Patient: Day Grimes   YOB: 1950   Date of Visit: 2/4/2022     Sena Greenberg MD  [id number] [address]  Via Fax: [phone]    Dear Sena Greenberg MD,      Thank you for referring Ms. Day Grimes to [facility] for [facility]. My notes for this consultation are attached. If you have questions, please do not hesitate to call me. I look forward to following your patient along with you.       Sincerely,    Julia Johnston MD

## 2022-02-04 NOTE — ASSESSMENT & PLAN NOTE
No anatomic or functional concerns on bladder evaluation. I do not think she needs medication to control her bladder symptoms if she has better diabetic and glycemic control. Continue oxybutynin prn.

## 2022-02-04 NOTE — PROGRESS NOTES
Chief Complaint   Patient presents with    Cystoscopy    Recurrent UTI    Urinary Incontinence     1. Have you been to the ER, urgent care clinic since your last visit? Hospitalized since your last visit? No    2. Have you seen or consulted any other health care providers outside of the [address] [place] [address] since your last visit? Include any pap smears or colon screening.  No  Visit Vitals  Ht 5' 3\" (1.6 m)   Wt 217 lb (98.4 kg)   BMI 38.44 kg/m²

## 2022-02-04 NOTE — ASSESSMENT & PLAN NOTE
Her bladder urine appears clear. She likely has contaminated urinary samples due to her body habitus.   I would avoid assessing for urine infections screening void urine samples

## 2022-02-04 NOTE — ASSESSMENT & PLAN NOTE
Poorly controlled diabetes likely exacerbates her urinary symptoms.   She is to follow-up with her primary team.

## 2022-02-04 NOTE — PROGRESS NOTES
Cystoscopy - Female    Findings:  Initial residual: large 200cc clear, with high urine output  Anterior urethra: normal mucosa  Bladder neck: Normal appearing  Bladder mucosa: Intact, no bas fond deformity, did not descend with strain  Trabeculation: none  Diverticula: none  Ureteral orifices: normal, efflux clear urine    CMG    Initial urge at (cc): 50  Strong urge at (cc): 275  Findings: No uninhibited contractions noted. No urge related incontinence noted    Uroflow/ PVR    Max Flow: 16 ml/sec    Avg flow: 9 ml/sec    Voided Volume:  292ml    Residual Volume:17ml    Shape of the curve: Normal bell shaped curve    Impression: Normal filling and emptying. No bladder overactivity.

## 2022-03-18 PROBLEM — N39.0 RECURRENT UTI: Status: ACTIVE | Noted: 2022-01-11

## 2022-03-19 PROBLEM — R55 SYNCOPE: Status: ACTIVE | Noted: 2021-08-20

## 2022-03-19 PROBLEM — G40.909 SEIZURE DISORDER (HCC): Status: ACTIVE | Noted: 2021-08-20

## 2022-03-19 PROBLEM — R55 SYNCOPE AND COLLAPSE: Status: ACTIVE | Noted: 2021-08-20

## 2022-03-19 PROBLEM — R81 GLUCOSURIA: Status: ACTIVE | Noted: 2022-01-11

## 2022-03-19 PROBLEM — N39.41 URGE INCONTINENCE: Status: ACTIVE | Noted: 2022-01-11

## 2022-03-20 PROBLEM — E78.2 MIXED HYPERLIPIDEMIA: Status: ACTIVE | Noted: 2018-02-20

## 2022-11-16 ENCOUNTER — HOSPITAL ENCOUNTER (EMERGENCY)
Age: 72
Discharge: HOME | End: 2022-11-16
Attending: EMERGENCY MEDICINE
Payer: MEDICARE

## 2022-11-16 ENCOUNTER — APPOINTMENT (OUTPATIENT)
Dept: NON INVASIVE DIAGNOSTICS | Age: 72
End: 2022-11-16
Attending: EMERGENCY MEDICINE
Payer: MEDICARE

## 2022-11-16 VITALS
BODY MASS INDEX: 34.99 KG/M2 | DIASTOLIC BLOOD PRESSURE: 52 MMHG | SYSTOLIC BLOOD PRESSURE: 115 MMHG | HEIGHT: 65 IN | OXYGEN SATURATION: 96 % | TEMPERATURE: 97.9 F | WEIGHT: 210 LBS | HEART RATE: 69 BPM | RESPIRATION RATE: 20 BRPM

## 2022-11-16 DIAGNOSIS — M79.604 RIGHT LEG PAIN: ICD-10-CM

## 2022-11-16 DIAGNOSIS — I80.01 THROMBOPHLEBITIS OF SUPERFICIAL VEINS OF RIGHT LOWER EXTREMITY: Primary | ICD-10-CM

## 2022-11-16 PROCEDURE — 99284 EMERGENCY DEPT VISIT MOD MDM: CPT

## 2022-11-16 PROCEDURE — 93005 ELECTROCARDIOGRAM TRACING: CPT

## 2022-11-16 PROCEDURE — 93971 EXTREMITY STUDY: CPT

## 2022-11-16 NOTE — DISCHARGE INSTRUCTIONS
Thank you! Thank you for allowing me to care for you in the emergency department. It is my goal to provide you with excellent care. If you have not received excellent quality care, please ask to speak to the nurse manager. Please fill out the survey that will come to you by mail or email since we listen to your feedback! Below you will find a list of your tests from today's visit. Should you have any questions, please do not hesitate to call the emergency department. Labs  No results found for this or any previous visit (from the past 12 hour(s)). Radiologic Studies  DUPLEX LOWER EXT VENOUS RIGHT    (Results Pending)     CT Results  (Last 48 hours)      None          CXR Results  (Last 48 hours)      None          ------------------------------------------------------------------------------------------------------------  The exam and treatment you received in the Emergency Department were for an urgent problem and are not intended as complete care. It is important that you follow-up with a doctor, nurse practitioner, or physician assistant to:  (1) confirm your diagnosis,  (2) re-evaluation of changes in your illness and treatment, and  (3) for ongoing care. Please take your discharge instructions with you when you go to your follow-up appointment. If you have any problem arranging a follow-up appointment, contact the Emergency Department. If your symptoms become worse or you do not improve as expected and you are unable to reach your health care provider, please return to the Emergency Department. We are available 24 hours a day. If a prescription has been provided, please have it filled as soon as possible to prevent a delay in treatment. If you have any questions or reservations about taking the medication due to side effects or interactions with other medications, please call your primary care provider or contact the ER.

## 2022-11-16 NOTE — ED PROVIDER NOTES
EMERGENCY DEPARTMENT HISTORY AND PHYSICAL EXAM      Date: 11/16/2022  Patient Name: Ana Kapadia  Patient Age and Sex: 71 y.o. female     History of Presenting Illness     Chief Complaint   Patient presents with    Leg Pain       History Provided By: Patient    HPI: Ana Kapadia is a 67-year-old female presenting for right leg pain. Patient states that yesterday she was seen at a urgent care center for right leg pain. The nurse there said she was concerned for a DVT and had lab work done with increased D-dimer. States that she was referred to the emergency department at [facility] in [place] but they did not do an ultrasound there. Presents here today for ultrasound. States that she has had a stroke in that right leg as well as arthritis in the leg with steroid injections. Has been hurting for about 1 month. There are no other complaints, changes, or physical findings at this time. PCP: Mathieu Dawn MD    No current facility-administered medications on file prior to encounter. Current Outpatient Medications on File Prior to Encounter   Medication Sig Dispense Refill    famotidine (PEPCID) 20 mg tablet       lisinopriL (PRINIVIL, ZESTRIL) 5 mg tablet       levETIRAcetam 1,000 mg tablet       meloxicam (MOBIC) 15 mg tablet       insulin glargine (LANTUS) 100 unit/mL injection 30 Units by SubCUTAneous route nightly. 1 Vial 0    pioglitazone (ACTOS) 30 mg tablet TAKE 1 TABLET BY MOUTH EVERY DAY 30 Tab 0    oxybutynin chloride XL (DITROPAN XL) 15 mg CR tablet Take 10 mg by mouth daily. atorvastatin (LIPITOR) 40 mg tablet Take 40 mg by mouth nightly. aspirin delayed-release 81 mg tablet Take  by mouth daily.          Past History     Past Medical History:  Past Medical History:   Diagnosis Date    Arthritis     Breast cancer ([address]     Diabetes ([facility]     Hyperlipidemia     Hypertension     Seizure ([facility] [address]     Seizures ([facility] [address] [facility]     Stroke [facility])        Past Surgical History:  Past Surgical History: Procedure Laterality Date    HX BILATERAL MASTECTOMY Left     HX CHOLECYSTECTOMY  2014    HX UROLOGICAL  02/04/2022    CYSTOSCOPY        Family History:  Family History   Problem Relation Age of Onset    Cancer Mother         stomach cancer    Diabetes Mother     Hypertension Mother     Diabetes Sister     Hypertension Sister     Cancer Sister     Cancer Other        Social History:  Social History     Tobacco Use    Smoking status: Former     Types: Cigarettes    Smokeless tobacco: Never   Vaping Use    Vaping Use: Never used   Substance Use Topics    Alcohol use: Not Currently    Drug use: Never       Allergies: Allergies   Allergen Reactions    Oxycodone-Acetaminophen Unknown (comments)    Vicodin [Hydrocodone-Acetaminophen] Unknown (comments)         Review of Systems   Review of Systems   Constitutional:  Negative for chills and fever. Respiratory:  Negative for cough and shortness of breath. Cardiovascular:  Positive for leg swelling. Negative for chest pain. Gastrointestinal:  Negative for abdominal pain, constipation, diarrhea, nausea and vomiting. Genitourinary:  Negative for dysuria, frequency and hematuria. Musculoskeletal:  Positive for arthralgias. Neurological:  Negative for weakness and numbness. All other systems reviewed and are negative. Physical Exam   Physical Exam  Constitutional:       General: She is not in acute distress. Appearance: She is well-developed. HENT:      Head: Normocephalic and atraumatic. Nose: Nose normal.      Mouth/Throat:      Mouth: Mucous membranes are moist.   Eyes:      Extraocular Movements: Extraocular movements intact. Conjunctiva/sclera: Conjunctivae normal.   Cardiovascular:      Comments: Well perfused  Pulmonary:      Effort: Pulmonary effort is normal. No respiratory distress. Musculoskeletal:         General: Normal range of motion. Cervical back: Normal range of motion.       Comments: Patient's right leg does not appear any more swollen or discolored compared to the left. Able to flex and extend at the knee without any difficulty. Some varicose veins. Neurological:      General: No focal deficit present. Mental Status: She is alert and oriented to person, place, and time. Psychiatric:         Mood and Affect: Mood normal.        Diagnostic Study Results     Labs -   No results found for this or any previous visit (from the past 12 hour(s)). Radiologic Studies -   DUPLEX LOWER EXT VENOUS RIGHT    (Results Pending)     CT Results  (Last 48 hours)      None          CXR Results  (Last 48 hours)      None              Medical Decision Making   I am the first provider for this patient. I reviewed the vital signs, available nursing notes, past medical history, past surgical history, family history and social history. Vital Signs-Reviewed the patient's vital signs. Patient Vitals for the past 12 hrs:   Temp Pulse Resp BP SpO2   11/16/22 1421 97.9 °F (36.6 °C) 69 20 (!) 115/52 96 %       Records Reviewed: Nursing Notes and Old Medical Records    Provider Notes (Medical Decision Making):   Patient presenting with some right leg pain and swelling. Already had labs done at outpatient to rule out congestive heart failure, liver or renal failure. Will get duplex to rule out DVT. ED Course:   Initial assessment performed. The patients presenting problems have been discussed, and they are in agreement with the care plan formulated and outlined with them. I have encouraged them to ask questions as they arise throughout their visit. Critical Care Time:   0    Disposition:  Discharge Note:  The patient has been re-evaluated and is ready for discharge. Reviewed available results with patient. Counseled patient on diagnosis and care plan. Patient has expressed understanding, and all questions have been answered.  Patient agrees with plan and agrees to follow up as recommended, or to return to the ED if their symptoms worsen. Discharge instructions have been provided and explained to the patient, along with reasons to return to the ED. PLAN:  Current Discharge Medication List        2. Follow-up Information       Follow up With Specialties Details Why Contact Info    Dannie Vera MD Family Medicine  As needed [address] [address] Estefani Figueroa  [phone]            3. Return to ED if worse     Diagnosis     Clinical Impression:   1. Thrombophlebitis of superficial veins of right lower extremity    2. Right leg pain        Attestations:  IRocio M.D., am the primary clinician of record. Please note that this dictation was completed with [employer], the computer voice recognition software. Quite often unanticipated grammatical, syntax, homophones, and other interpretive errors are inadvertently transcribed by the computer software. Please disregard these errors. Please excuse any errors that have escaped final proofreading. Thank you.

## 2022-11-16 NOTE — ED TRIAGE NOTES
Pt seen at urgent care center yesterday for right leg pain, pt was sent to another center for D Dimer, was told to come to ER for r/o DVT, denies sob, chest pain    Hx; DVT,

## 2022-11-17 PROCEDURE — 93971 EXTREMITY STUDY: CPT | Performed by: SURGERY

## 2022-11-21 LAB
ATRIAL RATE: 71 BPM
CALCULATED P AXIS, ECG09: 60 DEGREES
CALCULATED R AXIS, ECG10: 0 DEGREES
CALCULATED T AXIS, ECG11: 31 DEGREES
DIAGNOSIS, 93000: NORMAL
P-R INTERVAL, ECG05: 170 MS
Q-T INTERVAL, ECG07: 398 MS
QRS DURATION, ECG06: 80 MS
QTC CALCULATION (BEZET), ECG08: 432 MS
VENTRICULAR RATE, ECG03: 71 BPM

## 2022-12-09 ENCOUNTER — APPOINTMENT (OUTPATIENT)
Dept: NON INVASIVE DIAGNOSTICS | Age: 72
End: 2022-12-09
Attending: STUDENT IN AN ORGANIZED HEALTH CARE EDUCATION/TRAINING PROGRAM
Payer: MEDICARE

## 2022-12-09 ENCOUNTER — HOSPITAL ENCOUNTER (EMERGENCY)
Age: 72
Discharge: HOME | End: 2022-12-09
Attending: STUDENT IN AN ORGANIZED HEALTH CARE EDUCATION/TRAINING PROGRAM
Payer: MEDICARE

## 2022-12-09 ENCOUNTER — APPOINTMENT (OUTPATIENT)
Dept: GENERAL RADIOLOGY | Age: 72
End: 2022-12-09
Attending: STUDENT IN AN ORGANIZED HEALTH CARE EDUCATION/TRAINING PROGRAM
Payer: MEDICARE

## 2022-12-09 VITALS
HEART RATE: 68 BPM | RESPIRATION RATE: 18 BRPM | SYSTOLIC BLOOD PRESSURE: 174 MMHG | OXYGEN SATURATION: 97 % | BODY MASS INDEX: 34.49 KG/M2 | DIASTOLIC BLOOD PRESSURE: 67 MMHG | TEMPERATURE: 97.9 F | WEIGHT: 207 LBS | HEIGHT: 65 IN

## 2022-12-09 DIAGNOSIS — I80.9 THROMBOPHLEBITIS: ICD-10-CM

## 2022-12-09 DIAGNOSIS — M79.604 PAIN OF RIGHT LOWER EXTREMITY: Primary | ICD-10-CM

## 2022-12-09 LAB
ALBUMIN SERPL-MCNC: 3.8 G/DL (ref 3.5–5)
ALBUMIN/GLOB SERPL: 1.1 {RATIO} (ref 1.1–2.2)
ALP SERPL-CCNC: 72 U/L (ref 45–117)
ALT SERPL-CCNC: 29 U/L (ref 12–78)
ANION GAP SERPL CALC-SCNC: 5 MMOL/L (ref 5–15)
AST SERPL W P-5'-P-CCNC: 24 U/L (ref 15–37)
BASOPHILS # BLD: 0 K/UL (ref 0–0.1)
BASOPHILS NFR BLD: 0 % (ref 0–1)
BILIRUB SERPL-MCNC: 0.6 MG/DL (ref 0.2–1)
BUN SERPL-MCNC: 9 MG/DL (ref 6–20)
BUN/CREAT SERPL: 12 (ref 12–20)
CA-I BLD-MCNC: 9.1 MG/DL (ref 8.5–10.1)
CHLORIDE SERPL-SCNC: 102 MMOL/L (ref 97–108)
CO2 SERPL-SCNC: 27 MMOL/L (ref 21–32)
CREAT SERPL-MCNC: 0.76 MG/DL (ref 0.55–1.02)
DIFFERENTIAL METHOD BLD: ABNORMAL
EOSINOPHIL # BLD: 0.1 K/UL (ref 0–0.4)
EOSINOPHIL NFR BLD: 1 % (ref 0–7)
ERYTHROCYTE [DISTWIDTH] IN BLOOD BY AUTOMATED COUNT: 12 % (ref 11.5–14.5)
GLOBULIN SER CALC-MCNC: 3.6 G/DL (ref 2–4)
GLUCOSE SERPL-MCNC: 141 MG/DL (ref 65–100)
HCT VFR BLD AUTO: 35.7 % (ref 35–47)
HGB BLD-MCNC: 11.8 G/DL (ref 11.5–16)
IMM GRANULOCYTES # BLD AUTO: 0 K/UL (ref 0–0.04)
IMM GRANULOCYTES NFR BLD AUTO: 0 % (ref 0–0.5)
INR PPP: 1 (ref 0.9–1.1)
LYMPHOCYTES # BLD: 1.6 K/UL (ref 0.8–3.5)
LYMPHOCYTES NFR BLD: 24 % (ref 12–49)
MCH RBC QN AUTO: 32.2 PG (ref 26–34)
MCHC RBC AUTO-ENTMCNC: 33.1 G/DL (ref 30–36.5)
MCV RBC AUTO: 97.5 FL (ref 80–99)
MONOCYTES # BLD: 0.6 K/UL (ref 0–1)
MONOCYTES NFR BLD: 9 % (ref 5–13)
NEUTS SEG # BLD: 4.7 K/UL (ref 1.8–8)
NEUTS SEG NFR BLD: 66 % (ref 32–75)
NRBC # BLD: 0 K/UL (ref 0–0.01)
NRBC BLD-RTO: 0 PER 100 WBC
PLATELET # BLD AUTO: 189 K/UL (ref 150–400)
PMV BLD AUTO: 10.9 FL (ref 8.9–12.9)
POTASSIUM SERPL-SCNC: 4.2 MMOL/L (ref 3.5–5.1)
PROT SERPL-MCNC: 7.4 G/DL (ref 6.4–8.2)
PROTHROMBIN TIME: 13.5 SEC (ref 11.9–14.6)
RBC # BLD AUTO: 3.66 M/UL (ref 3.8–5.2)
SODIUM SERPL-SCNC: 134 MMOL/L (ref 136–145)
WBC # BLD AUTO: 7 K/UL (ref 3.6–11)

## 2022-12-09 PROCEDURE — 73590 X-RAY EXAM OF LOWER LEG: CPT

## 2022-12-09 PROCEDURE — 80053 COMPREHEN METABOLIC PANEL: CPT

## 2022-12-09 PROCEDURE — 36415 COLL VENOUS BLD VENIPUNCTURE: CPT

## 2022-12-09 PROCEDURE — 85610 PROTHROMBIN TIME: CPT

## 2022-12-09 PROCEDURE — 99284 EMERGENCY DEPT VISIT MOD MDM: CPT

## 2022-12-09 PROCEDURE — 93971 EXTREMITY STUDY: CPT

## 2022-12-09 PROCEDURE — 85025 COMPLETE CBC W/AUTO DIFF WBC: CPT

## 2022-12-09 RX ORDER — TRAMADOL HYDROCHLORIDE 50 MG/1
50 TABLET ORAL
Qty: 10 TABLET | Refills: 0 | OUTPATIENT
Start: 2022-12-09 | End: 2022-12-09 | Stop reason: SDUPTHER

## 2022-12-09 RX ORDER — TRAMADOL HYDROCHLORIDE 50 MG/1
50 TABLET ORAL
Qty: 10 TABLET | Refills: 0 | Status: SHIPPED | OUTPATIENT
Start: 2022-12-09 | End: 2022-12-12

## 2022-12-09 NOTE — ED PROVIDER NOTES
EMERGENCY DEPARTMENT HISTORY AND PHYSICAL EXAM      Date: 12/9/2022  Patient Name: Angeline Mcguire    History of Presenting Illness     Chief Complaint   Patient presents with    Blood Clot       History Provided By: Patient    HPI: Angeline Mcguire, 71 y.o. female with a past medical history significant  Beatties, hyperlipidemia, stroke  presents to the ED with cc of right lower extremity swelling pain. Patient states she was diagnosed with a \"blood clot\" several weeks ago, since that time has continued to complain of pain to the right lower extremity noted worsening swelling and pain over the last week. Patient states her cardiologist told her to come to emergency department for evaluation, blood draws and scan to see if \"blood clot has moved. \"  Patient denies any chest pain shortness of breath, denies any fevers chills. There are no other complaints, changes, or physical findings at this time. PCP: Dannie Vera MD    No current facility-administered medications on file prior to encounter. Current Outpatient Medications on File Prior to Encounter   Medication Sig Dispense Refill    famotidine (PEPCID) 20 mg tablet       lisinopriL (PRINIVIL, ZESTRIL) 5 mg tablet       levETIRAcetam 1,000 mg tablet       meloxicam (MOBIC) 15 mg tablet       insulin glargine (LANTUS) 100 unit/mL injection 30 Units by SubCUTAneous route nightly. 1 Vial 0    pioglitazone (ACTOS) 30 mg tablet TAKE 1 TABLET BY MOUTH EVERY DAY 30 Tab 0    oxybutynin chloride XL (DITROPAN XL) 15 mg CR tablet Take 10 mg by mouth daily. atorvastatin (LIPITOR) 40 mg tablet Take 40 mg by mouth nightly. aspirin delayed-release 81 mg tablet Take  by mouth daily.          Past History     Past Medical History:  Past Medical History:   Diagnosis Date    Arthritis     Breast cancer ([facility]     Diabetes ([facility]     Hyperlipidemia     Hypertension     Seizure ([facility] [address] [facility]     Seizures ([facility] [address] [facility]     Stroke [facility])        Past Surgical History:  Past Surgical History:   Procedure Laterality Date    HX BILATERAL MASTECTOMY Left     HX CHOLECYSTECTOMY  2014    HX UROLOGICAL  02/04/2022    CYSTOSCOPY        Family History:  Family History   Problem Relation Age of Onset    Cancer Mother         stomach cancer    Diabetes Mother     Hypertension Mother     Diabetes Sister     Hypertension Sister     Cancer Sister     Cancer Other        Social History:  Social History     Tobacco Use    Smoking status: Former     Types: Cigarettes    Smokeless tobacco: Never   Vaping Use    Vaping Use: Never used   Substance Use Topics    Alcohol use: Not Currently    Drug use: Never       Allergies: Allergies   Allergen Reactions    Oxycodone-Acetaminophen Unknown (comments)    Vicodin [Hydrocodone-Acetaminophen] Unknown (comments)         Review of Systems     Review of Systems   Constitutional:  Negative for activity change, chills, fatigue and fever. HENT:  Negative for congestion and trouble swallowing. Eyes:  Negative for photophobia and visual disturbance. Respiratory:  Negative for cough, chest tightness and shortness of breath. Cardiovascular:  Negative for chest pain and palpitations. Gastrointestinal:  Negative for abdominal distention, abdominal pain, diarrhea, nausea and vomiting. Genitourinary:  Negative for dysuria, flank pain and frequency. Musculoskeletal:  Positive for arthralgias and myalgias. Negative for back pain. Skin:  Negative for color change, pallor and rash. Neurological:  Negative for dizziness, tremors, weakness and headaches. Psychiatric/Behavioral:  Negative for confusion. Physical Exam     Physical Exam  Vitals and nursing note reviewed. Constitutional:       Appearance: She is well-developed. She is obese. HENT:      Head: Normocephalic and atraumatic. Eyes:      Extraocular Movements: Extraocular movements intact. Pupils: Pupils are equal, round, and reactive to light. Neck:      Vascular: No JVD.    Cardiovascular: Rate and Rhythm: Normal rate and regular rhythm. Pulses: Normal pulses. Radial pulses are 2+ on the right side and 2+ on the left side. Dorsalis pedis pulses are 2+ on the right side and 2+ on the left side. Heart sounds: Normal heart sounds. Pulmonary:      Effort: Pulmonary effort is normal.      Breath sounds: Normal breath sounds. Chest:      Chest wall: No tenderness. Abdominal:      General: Bowel sounds are normal.      Palpations: Abdomen is soft. Musculoskeletal:         General: Tenderness present. Cervical back: Normal range of motion and neck supple. Right lower leg: No edema. Left lower leg: No edema. Comments: Right lower extremity tenderness to palpation   Skin:     General: Skin is warm and dry. Neurological:      General: No focal deficit present. Mental Status: She is alert and oriented to person, place, and time. Psychiatric:         Mood and Affect: Mood normal.         Behavior: Behavior normal.       Diagnostic Study Results     Labs -     Recent Results (from the past 12 hour(s))   PROTHROMBIN TIME + INR    Collection Time: 12/09/22  2:42 PM   Result Value Ref Range    Prothrombin time 13.5 11.9 - 14.6 sec    INR 1.0 0.9 - 1.1     METABOLIC PANEL, COMPREHENSIVE    Collection Time: 12/09/22  2:42 PM   Result Value Ref Range    Sodium 134 (L) 136 - 145 mmol/L    Potassium 4.2 3.5 - 5.1 mmol/L    Chloride 102 97 - 108 mmol/L    CO2 27 21 - 32 mmol/L    Anion gap 5 5 - 15 mmol/L    Glucose 141 (H) 65 - 100 mg/dL    BUN 9 6 - 20 mg/dL    Creatinine 0.76 0.55 - 1.02 mg/dL    BUN/Creatinine ratio 12 12 - 20      eGFR >60 >60 ml/min/1.73m2    Calcium 9.1 8.5 - 10.1 mg/dL    Bilirubin, total 0.6 0.2 - 1.0 mg/dL    AST (SGOT) 24 15 - 37 U/L    ALT (SGPT) 29 12 - 78 U/L    Alk.  phosphatase 72 45 - 117 U/L    Protein, total 7.4 6.4 - 8.2 g/dL    Albumin 3.8 3.5 - 5.0 g/dL    Globulin 3.6 2.0 - 4.0 g/dL    A-G Ratio 1.1 1.1 - 2.2     CBC WITH AUTOMATED DIFF    Collection Time: 12/09/22  2:42 PM   Result Value Ref Range    WBC 7.0 3.6 - 11.0 K/uL    RBC 3.66 (L) 3.80 - 5.20 M/uL    HGB 11.8 11.5 - 16.0 g/dL    HCT 35.7 35.0 - 47.0 %    MCV 97.5 80.0 - 99.0 FL    MCH 32.2 26.0 - 34.0 PG    MCHC 33.1 30.0 - 36.5 g/dL    RDW 12.0 11.5 - 14.5 %    PLATELET 122 887 - 779 K/uL    MPV 10.9 8.9 - 12.9 FL    NRBC 0.0 0.0  WBC    ABSOLUTE NRBC 0.00 0.00 - 0.01 K/uL    NEUTROPHILS 66 32 - 75 %    LYMPHOCYTES 24 12 - 49 %    MONOCYTES 9 5 - 13 %    EOSINOPHILS 1 0 - 7 %    BASOPHILS 0 0 - 1 %    IMMATURE GRANULOCYTES 0 0 - 0.5 %    ABS. NEUTROPHILS 4.7 1.8 - 8.0 K/UL    ABS. LYMPHOCYTES 1.6 0.8 - 3.5 K/UL    ABS. MONOCYTES 0.6 0.0 - 1.0 K/UL    ABS. EOSINOPHILS 0.1 0.0 - 0.4 K/UL    ABS. BASOPHILS 0.0 0.0 - 0.1 K/UL    ABS. IMM. GRANS. 0.0 0.00 - 0.04 K/UL    DF AUTOMATED         Radiologic Studies -   @[email or web address]@  CT Results  (Last 48 hours)      None          CXR Results  (Last 48 hours)      None              Medical Decision Making   I am the first provider for this patient. I reviewed the vital signs, available nursing notes, past medical history, past surgical history, family history and social history. Vital Signs-Reviewed the patient's vital signs. Patient Vitals for the past 12 hrs:   Temp Pulse Resp BP SpO2   12/09/22 1358 97.9 °F (36.6 °C) 68 18 (!) 174/67 97 %       Records Reviewed: Nursing Notes    The patient presents with right lower extremity pain swelling with a differential diagnosis of thrombophlebitis, DVT, cellulitis      Provider Notes (Medical Decision Making):     MDM     76-year-old female history of thrombophlebitis, peripheral DVT presents emergency department for worsening pain to her right lower extremity and swelling. Physical shows uncomfortable female however in no distress, stable vitals, right lower extremity significant tender to palpation over posterior aspect, mild edema noted.     We will draw basic lab work, obtain repeat duplex studies to evaluate for possible clot migration. ED Course:   Initial assessment performed. The patients presenting problems have been discussed, and they are in agreement with the care plan formulated and outlined with them. I have encouraged them to ask questions as they arise throughout their visit. ED Course as of 12/09/22 2225   Fri Dec 09, 2022   1543 Patient's duplex resulted, technologist states that findings similar from previous, no DVT, peripheral thrombophlebitis noted. [PZ]   0190 I discussed results with patient, patient denies any acute falls or injuries recently, still complaining of severe pain to her right lower extremity. Patient has 2+ PT and DP pulses do not suspect acute vascular insufficiency. Will order tib-fib films to rule out occult fracture at this time most likely patient suffering from acute muscular pain secondary to thrombophlebitis, possibly myalgia, [PZ]   1650 Tib-fib x-ray does not show any acute fractures. Most likely patient suffering from pain secondary to thrombophlebitis, will provide patient with analgesia, lidocaine patches, instructed to continue applying warm compress to area, follow-up with primary care physician in the next week as needed, return to emergency department for any worsening lower extremity pain and swelling. [PZ]      ED Course User Index  [PZ] Elham Stevens MD         PROCEDURES  Procedures         PLAN:  1. Discharge Medication List as of 12/9/2022  5:14 PM        START taking these medications    Details   lidocaine HCL 4 % ptmd 1 Patch by Apply Externally route daily. , Normal, Disp-10 Each, R-0           CONTINUE these medications which have CHANGED    Details   traMADoL (ULTRAM) 50 mg tablet Take 1 Tablet by mouth every six (6) hours as needed for Pain for up to 3 days. Max Daily Amount: 200 mg.  Indications: pain, Normal, Disp-10 Tablet, R-0           CONTINUE these medications which have NOT CHANGED Details   famotidine (PEPCID) 20 mg tablet Historical Med      lisinopriL (PRINIVIL, ZESTRIL) 5 mg tablet Historical Med      levETIRAcetam 1,000 mg tablet Historical Med      meloxicam (MOBIC) 15 mg tablet Historical Med      insulin glargine (LANTUS) 100 unit/mL injection 30 Units by SubCUTAneous route nightly., Normal, Disp-1 Vial, R-0      pioglitazone (ACTOS) 30 mg tablet TAKE 1 TABLET BY MOUTH EVERY DAY, Normal, Disp-30 Tab, R-0      oxybutynin chloride XL (DITROPAN XL) 15 mg CR tablet Take 10 mg by mouth daily. , Historical Med      atorvastatin (LIPITOR) 40 mg tablet Take 40 mg by mouth nightly., Historical Med      aspirin delayed-release 81 mg tablet Take  by mouth daily. , Historical Med           2. Follow-up Information       Follow up With Specialties Details Why Contact Info    Brandi Lay MD Family Medicine In 1 week As needed [address] [place] [address]  [phone]            Return to ED if worse     Diagnosis     Clinical Impression:   1. Pain of right lower extremity    2.  Thrombophlebitis

## 2022-12-09 NOTE — ED TRIAGE NOTES
Pt here from cardiologist due to dvt in her R leg.  Dx here in 11.16.22. Pain is increasing and MD worried clot is \"traveling\"

## 2023-03-08 NOTE — PROGRESS NOTES
HISTORY OF PRESENT ILLNESS  Kaylee Lim is a 80 y.o. female. Chief Complaint   Patient presents with    Cystoscopy    Recurrent UTI    Urinary Incontinence     Past Medical History:  PMHx (including negatives):  has a past medical history of Arthritis, Breast cancer ([address] Diabetes ([address] Hyperlipidemia, Hypertension, Seizure ([address] Seizures ([address] and Stroke ([address] PSurgHx:  has a past surgical history that includes hx cholecystectomy (2014); hx bilateral mastectomy (Left); and hx urological (02/04/2022). PSocHx:  reports that she has quit smoking. Her smoking use included cigarettes. She has never used smokeless tobacco. She reports previous alcohol use. She reports that she does not use drugs. She is here today for further evaluation of her voiding symptoms via cystoscopy, CMG, uroflow. She is diabetic. Last A1c was 7.7. She has urge incontinence, on Ditropan XL 15 mg. She does use 3-4 pull-ups a day. She has a history of UTI though it is unclear if it is recurrent or persistent versus contamination from incontinence. We we will send a catheterized sample for evaluation with cystoscopy today. Chronic Conditions Addressed Today     1. Recurrent UTI     Overview      Available cultures show mixed urogenital john or lactobacillus. We do not have access to old records. 1/11/22: It is unclear if she has recurrent or persistent infection or even contamination from her incontinence. I recommend we send a catheterized sample for evaluation at time of her cystoscopy. 2. Glucosuria     Overview      Glucose in the urine. 8/20/21 hemoglobin A1c was 7.7         3. Urge incontinence - Primary     Overview      1/11/22: She has infections and incontinence. She takes Ditropan XL, 15 mg. She wears pullups, 3-4x per day. She has problems during the day and night.   Urgency/urge incontinence.                                ROS  Patient denies the symptoms of COVID-19 per routine screening guidelines. Physical Exam    ASSESSMENT and PLAN  Diagnoses and all orders for this visit:    1. Urge incontinence  Assessment & Plan:  No anatomic or functional concerns on bladder evaluation. I do not think she needs medication to control her bladder symptoms if she has better diabetic and glycemic control. Continue oxybutynin prn. Orders:  -     AMB POC URINALYSIS DIP STICK AUTO W/O MICRO  -     AMB POC UROFLOWMETRY  -     AMB POC PVR, DEVIN,POST-VOID RES,US,NON-IMAGING    2. Recurrent UTI  Assessment & Plan:   Her bladder urine appears clear. She likely has contaminated urinary samples due to her body habitus. I would avoid assessing for urine infections screening void urine samples    Orders:  -     AMB POC URINALYSIS DIP STICK AUTO W/O MICRO  -     AMB POC UROFLOWMETRY  -     AMB POC PVR, DEVIN,POST-VOID RES,US,NON-IMAGING    3. Glucosuria  Assessment & Plan:   Poorly controlled diabetes likely precipitates her urinary symptoms. She was recently hospitalized for high glucose greater than 500. She is advised to continue to improve her diabetic control. 4. Type 2 diabetes mellitus with hyperglycemia, with long-term current use of insulin ([facility]  Assessment & Plan:  Poorly controlled diabetes likely exacerbates her urinary symptoms. She is to follow-up with her primary team.           Follow-up and Dispositions    · Return in about 1 year (around 2/4/2023). Kajal De Jesus may have a reminder for a \"due or due soon\" health maintenance. The patient has been encouraged to contact their primary care provider for follow-up on this health maintenance or other necessary and/or routine health screening.      Demian Lin MD show

## 2023-03-19 ENCOUNTER — APPOINTMENT (OUTPATIENT)
Dept: CT IMAGING | Age: 73
End: 2023-03-19
Attending: EMERGENCY MEDICINE
Payer: MEDICARE

## 2023-03-19 ENCOUNTER — HOSPITAL ENCOUNTER (EMERGENCY)
Age: 73
Discharge: HOME | End: 2023-03-19
Attending: EMERGENCY MEDICINE
Payer: MEDICARE

## 2023-03-19 ENCOUNTER — APPOINTMENT (OUTPATIENT)
Dept: GENERAL RADIOLOGY | Age: 73
End: 2023-03-19
Attending: EMERGENCY MEDICINE
Payer: MEDICARE

## 2023-03-19 VITALS
HEART RATE: 63 BPM | RESPIRATION RATE: 15 BRPM | OXYGEN SATURATION: 98 % | HEIGHT: 65 IN | BODY MASS INDEX: 34.49 KG/M2 | DIASTOLIC BLOOD PRESSURE: 71 MMHG | WEIGHT: 207 LBS | SYSTOLIC BLOOD PRESSURE: 142 MMHG | TEMPERATURE: 98.2 F

## 2023-03-19 DIAGNOSIS — T17.320A CHOKING DUE TO FOOD IN LARYNX, INITIAL ENCOUNTER: Primary | ICD-10-CM

## 2023-03-19 PROCEDURE — 71045 X-RAY EXAM CHEST 1 VIEW: CPT

## 2023-03-19 PROCEDURE — 99284 EMERGENCY DEPT VISIT MOD MDM: CPT

## 2023-03-19 PROCEDURE — 70450 CT HEAD/BRAIN W/O DYE: CPT

## 2023-03-20 NOTE — ED PROVIDER NOTES
[facility] EMERGENCY DEPT  EMERGENCY DEPARTMENT HISTORY AND PHYSICAL EXAM      Date: 3/19/2023  Patient Name: Rj Guan  MRN: [id number]  [place]: 1950  Date of evaluation: 3/19/2023  Provider: Dalton Go MD   Note Started: 10:03 PM 3/19/23    HISTORY OF PRESENT ILLNESS     Chief Complaint   Patient presents with    Dysphagia       History Provided By: Patient    HPI: Rj Guan is a 71 y.o. female past medical history significant for diabetes and a stroke with hyperlipidemia in the past says over the past couple of months she has had a few episodes where she is unable to swallow her food and ends up coughing it up. Patient says she has had no shortness of breath and denies any other focal weaknesses or deficits. It happens with both solids as well as liquids. Symptoms are mild she specifically denies any fever, chills, nausea, vomiting, chest pain, shortness of breath, rash, diarrhea, headache, night sweats. PAST MEDICAL HISTORY   Past Medical History:  Past Medical History:   Diagnosis Date    Arthritis     Breast cancer ([facility]     Diabetes ([facility] [facility]     Hyperlipidemia     Hypertension     Seizure ([facility] [address]     Seizures ([facility] [address] [facility]     Stroke [facility])        Past Surgical History:  Past Surgical History:   Procedure Laterality Date    HX BILATERAL MASTECTOMY Left     HX CHOLECYSTECTOMY  2014    HX UROLOGICAL  02/04/2022    CYSTOSCOPY        Family History:  Family History   Problem Relation Age of Onset    Cancer Mother         stomach cancer    Diabetes Mother     Hypertension Mother     Diabetes Sister     Hypertension Sister     Cancer Sister     Cancer Other        Social History:  Social History     Tobacco Use    Smoking status: Former     Types: Cigarettes    Smokeless tobacco: Never   Vaping Use    Vaping Use: Never used   Substance Use Topics    Alcohol use: Not Currently    Drug use: Never       Allergies:   Allergies   Allergen Reactions    Oxycodone-Acetaminophen Unknown (comments)    Vicodin [Hydrocodone-Acetaminophen] Unknown (comments)       PCP: Silva Sidhu MD    Current Meds:   Discharge Medication List as of 3/19/2023 11:00 PM        CONTINUE these medications which have NOT CHANGED    Details   lidocaine HCL 4 % ptmd 1 Patch by Apply Externally route daily. , Normal, Disp-10 Each, R-0      famotidine (PEPCID) 20 mg tablet Historical Med      lisinopriL (PRINIVIL, ZESTRIL) 5 mg tablet Historical Med      levETIRAcetam 1,000 mg tablet Historical Med      meloxicam (MOBIC) 15 mg tablet Historical Med      insulin glargine (LANTUS) 100 unit/mL injection 30 Units by SubCUTAneous route nightly., Normal, Disp-1 Vial, R-0      pioglitazone (ACTOS) 30 mg tablet TAKE 1 TABLET BY MOUTH EVERY DAY, Normal, Disp-30 Tab, R-0      oxybutynin chloride XL (DITROPAN XL) 15 mg CR tablet Take 10 mg by mouth daily. , Historical Med      atorvastatin (LIPITOR) 40 mg tablet Take 40 mg by mouth nightly., Historical Med      aspirin delayed-release 81 mg tablet Take  by mouth daily. , Historical Med             PHYSICAL EXAM     ED Triage Vitals [03/19/23 2113]   ED Encounter Vitals Group      BP (!) 150/74      Pulse (Heart Rate) 60      Resp Rate 16      Temp 98.7 °F (37.1 °C)      Temp src       O2 Sat (%) 100 %      Weight 207 lb      Height 5' 5\"      Physical Exam  Vitals and nursing note reviewed. Constitutional:       General: She is not in acute distress. Appearance: She is well-developed. HENT:      Head: Normocephalic and atraumatic. Nose: Nose normal.      Mouth/Throat:      Mouth: Mucous membranes are moist.      Pharynx: Oropharynx is clear. No oropharyngeal exudate. Eyes:      General:         Right eye: No discharge. Left eye: No discharge. Conjunctiva/sclera: Conjunctivae normal.      Pupils: Pupils are equal, round, and reactive to light. Cardiovascular:      Rate and Rhythm: Normal rate and regular rhythm. Chest Wall: PMI is not displaced. No thrill.       Heart sounds: Normal heart sounds. No murmur heard. No friction rub. No gallop. Pulmonary:      Effort: Pulmonary effort is normal. No respiratory distress. Breath sounds: Normal breath sounds. No wheezing or rales. Chest:      Chest wall: No tenderness. Abdominal:      General: Bowel sounds are normal. There is no distension. Palpations: Abdomen is soft. There is no mass. Tenderness: There is no abdominal tenderness. There is no guarding or rebound. Musculoskeletal:         General: Normal range of motion. Cervical back: Normal range of motion and neck supple. Lymphadenopathy:      Cervical: No cervical adenopathy. Skin:     General: Skin is warm and dry. Capillary Refill: Capillary refill takes less than 2 seconds. Findings: No erythema or rash. Neurological:      Mental Status: She is alert and oriented to person, place, and time. Cranial Nerves: No cranial nerve deficit. Coordination: Coordination normal.   Psychiatric:         Mood and Affect: Mood normal.         Behavior: Behavior normal.         SCREENINGS        No data recorded      Differential includes aspiration, difficulty swallowing. Will obtain CT of the head and chest x-ray to assess reassure no appreciable acute infarction versus aspiration pneumonia. LAB, EKG AND DIAGNOSTIC RESULTS   Labs:  No results found for this or any previous visit (from the past 12 hour(s)). PROCEDURES   Unless otherwise noted below, none. Procedures      CRITICAL CARE TIME   None    ED COURSE and DIFFERENTIAL DIAGNOSIS/MDM   CC/HPI Summary, DDx, ED Course, and Reassessment: Patient has some difficulty swallowing is also had some reflux. We will have her follow-up with GI as an outpatient.     Disposition Considerations (Tests not done, Shared Decision Making, Pt Expectation of Test or Treatment.):  Patient has no evidence of stroke at this point in time    Records Reviewed (source and summary of external notes): Prior medical records and Nursing notes    Vitals:    Vitals:    03/19/23 2113 03/19/23 2143 03/19/23 2243 03/19/23 2258   BP: (!) 150/74 136/64 135/69 (!) 142/71   Pulse: 60   63   Resp: 16   15   Temp: 98.7 °F (37.1 °C)   98.2 °F (36.8 °C)   SpO2: 100% 94% 96% 98%   Weight: 93.9 kg (207 lb)      Height: 5' 5\" (1.651 m)                Patient was given the following medications:  Medications - No data to display    CONSULTS: (Who and What was discussed)  None     Social Determinants affecting Dx or Tx: None    FINAL IMPRESSION     1. Choking due to food in larynx, initial encounter          DISPOSITION/PLAN   Discharged    Discharge Note: The patient is stable for discharge home. The signs, symptoms, diagnosis, and discharge instructions have been discussed, understanding conveyed, and agreed upon. The patient is to follow up as recommended or return to ER should their symptoms worsen. PATIENT REFERRED TO:  Follow-up Information       Follow up With Specialties Details Why Contact Info    Alvaro Ott MD Family Medicine Call in 2 days  [address]      Kaiden Cartagena MD Gastroenterology, Internal Medicine Physician Call in 2 days  [address] [place] [address]  [phone]                DISCHARGE MEDICATIONS:  Discharge Medication List as of 3/19/2023 11:00 PM            DISCONTINUED MEDICATIONS:  Discharge Medication List as of 3/19/2023 11:00 PM          I am the Primary Clinician of Record: Jules Wu MD (electronically signed)    (Please note that parts of this dictation were completed with voice recognition software. Quite often unanticipated grammatical, syntax, homophones, and other interpretive errors are inadvertently transcribed by the computer software. Please disregards these errors.  Please excuse any errors that have escaped final proofreading.)

## 2023-03-20 NOTE — ED TRIAGE NOTES
Patient states that she started having trouble swallowing today and was seen at urgent care and sent home, hx of stroke.

## 2023-03-23 ENCOUNTER — TELEPHONE (OUTPATIENT)
Age: 73
End: 2023-03-23

## 2023-03-23 NOTE — TELEPHONE ENCOUNTER
Unable to LM mailbox full to schedule patient consult appointment with Dr. Em Holder for ventral hernia.

## 2023-03-30 ENCOUNTER — TELEPHONE (OUTPATIENT)
Age: 73
End: 2023-03-30

## 2023-03-30 NOTE — TELEPHONE ENCOUNTER
Spoke with patient who stated she preferred [facility], suggested patient call back PCP for referral to [facility] provider, patient has office contact number in case she changes her mind.

## 2023-04-03 ENCOUNTER — APPOINTMENT (OUTPATIENT)
Dept: CT IMAGING | Age: 73
End: 2023-04-03
Attending: EMERGENCY MEDICINE
Payer: MEDICARE

## 2023-04-03 ENCOUNTER — HOSPITAL ENCOUNTER (EMERGENCY)
Age: 73
Discharge: HOME | End: 2023-04-03
Attending: EMERGENCY MEDICINE
Payer: MEDICARE

## 2023-04-03 ENCOUNTER — APPOINTMENT (OUTPATIENT)
Dept: GENERAL RADIOLOGY | Age: 73
End: 2023-04-03
Attending: EMERGENCY MEDICINE
Payer: MEDICARE

## 2023-04-03 DIAGNOSIS — G40.909 RECURRENT SEIZURES (HCC): Primary | ICD-10-CM

## 2023-04-03 DIAGNOSIS — R13.10 DYSPHAGIA, UNSPECIFIED TYPE: ICD-10-CM

## 2023-04-03 DIAGNOSIS — Z86.69 HISTORY OF EPILEPSY: ICD-10-CM

## 2023-04-03 LAB
ABO + RH BLD: NORMAL
ALBUMIN SERPL-MCNC: 3.4 G/DL (ref 3.5–5)
ALBUMIN/GLOB SERPL: 1.1 (ref 1.1–2.2)
ALP SERPL-CCNC: 80 U/L (ref 45–117)
ALT SERPL-CCNC: 22 U/L (ref 12–78)
ANION GAP SERPL CALC-SCNC: 7 MMOL/L (ref 5–15)
APTT PPP: 29.8 SEC (ref 21.2–34.1)
AST SERPL W P-5'-P-CCNC: 15 U/L (ref 15–37)
BASOPHILS # BLD: 0 K/UL (ref 0–0.1)
BASOPHILS NFR BLD: 0 % (ref 0–1)
BILIRUB SERPL-MCNC: 0.6 MG/DL (ref 0.2–1)
BLOOD GROUP ANTIBODIES SERPL: NEGATIVE
BNP SERPL-MCNC: 29 PG/ML
BUN SERPL-MCNC: 8 MG/DL (ref 6–20)
BUN/CREAT SERPL: 11 (ref 12–20)
CA-I BLD-MCNC: 9 MG/DL (ref 8.5–10.1)
CHLORIDE SERPL-SCNC: 104 MMOL/L (ref 97–108)
CK SERPL-CCNC: 170 U/L (ref 26–192)
CO2 SERPL-SCNC: 26 MMOL/L (ref 21–32)
CREAT SERPL-MCNC: 0.71 MG/DL (ref 0.55–1.02)
DIFFERENTIAL METHOD BLD: ABNORMAL
EOSINOPHIL # BLD: 0.2 K/UL (ref 0–0.4)
EOSINOPHIL NFR BLD: 2 % (ref 0–7)
ERYTHROCYTE [DISTWIDTH] IN BLOOD BY AUTOMATED COUNT: 11.4 % (ref 11.5–14.5)
GLOBULIN SER CALC-MCNC: 3.2 G/DL (ref 2–4)
GLUCOSE SERPL-MCNC: 224 MG/DL (ref 65–100)
HCT VFR BLD AUTO: 38.5 % (ref 35–47)
HGB BLD-MCNC: 12.6 G/DL (ref 11.5–16)
IMM GRANULOCYTES # BLD AUTO: 0 K/UL (ref 0–0.04)
IMM GRANULOCYTES NFR BLD AUTO: 0 % (ref 0–0.5)
INR PPP: 1.1 (ref 0.9–1.1)
LACTATE SERPL-SCNC: 1.5 MMOL/L (ref 0.4–2)
LYMPHOCYTES # BLD: 2.3 K/UL (ref 0.8–3.5)
LYMPHOCYTES NFR BLD: 32 % (ref 12–49)
MAGNESIUM SERPL-MCNC: 2.1 MG/DL (ref 1.6–2.4)
MCH RBC QN AUTO: 31.5 PG (ref 26–34)
MCHC RBC AUTO-ENTMCNC: 32.7 G/DL (ref 30–36.5)
MCV RBC AUTO: 96.3 FL (ref 80–99)
MONOCYTES # BLD: 0.5 K/UL (ref 0–1)
MONOCYTES NFR BLD: 7 % (ref 5–13)
NEUTS SEG # BLD: 4.3 K/UL (ref 1.8–8)
NEUTS SEG NFR BLD: 59 % (ref 32–75)
NRBC # BLD: 0 K/UL (ref 0–0.01)
NRBC BLD-RTO: 0 PER 100 WBC
PLATELET # BLD AUTO: 189 K/UL (ref 150–400)
PMV BLD AUTO: 11.2 FL (ref 8.9–12.9)
POTASSIUM SERPL-SCNC: 3.9 MMOL/L (ref 3.5–5.1)
PROT SERPL-MCNC: 6.6 G/DL (ref 6.4–8.2)
PROTHROMBIN TIME: 14 SEC (ref 11.9–14.6)
RBC # BLD AUTO: 4 M/UL (ref 3.8–5.2)
SODIUM SERPL-SCNC: 137 MMOL/L (ref 136–145)
SPECIMEN EXP DATE BLD: NORMAL
THERAPEUTIC RANGE,PTTT: NORMAL SEC (ref 82–109)
TROPONIN I SERPL HS-MCNC: 6 NG/L (ref 0–51)
TROPONIN I SERPL HS-MCNC: 9 NG/L (ref 0–51)
WBC # BLD AUTO: 7.3 K/UL (ref 3.6–11)

## 2023-04-03 PROCEDURE — 86900 BLOOD TYPING SEROLOGIC ABO: CPT

## 2023-04-03 PROCEDURE — 84484 ASSAY OF TROPONIN QUANT: CPT

## 2023-04-03 PROCEDURE — 85610 PROTHROMBIN TIME: CPT

## 2023-04-03 PROCEDURE — 93005 ELECTROCARDIOGRAM TRACING: CPT

## 2023-04-03 PROCEDURE — 36415 COLL VENOUS BLD VENIPUNCTURE: CPT

## 2023-04-03 PROCEDURE — 85025 COMPLETE CBC W/AUTO DIFF WBC: CPT

## 2023-04-03 PROCEDURE — 82550 ASSAY OF CK (CPK): CPT

## 2023-04-03 PROCEDURE — 83735 ASSAY OF MAGNESIUM: CPT

## 2023-04-03 PROCEDURE — 85730 THROMBOPLASTIN TIME PARTIAL: CPT

## 2023-04-03 PROCEDURE — 71260 CT THORAX DX C+: CPT

## 2023-04-03 PROCEDURE — 71045 X-RAY EXAM CHEST 1 VIEW: CPT

## 2023-04-03 PROCEDURE — 74011000636 HC RX REV CODE- 636: Performed by: EMERGENCY MEDICINE

## 2023-04-03 PROCEDURE — 80177 DRUG SCRN QUAN LEVETIRACETAM: CPT

## 2023-04-03 PROCEDURE — 74011250636 HC RX REV CODE- 250/636: Performed by: EMERGENCY MEDICINE

## 2023-04-03 PROCEDURE — 70450 CT HEAD/BRAIN W/O DYE: CPT

## 2023-04-03 PROCEDURE — 83880 ASSAY OF NATRIURETIC PEPTIDE: CPT

## 2023-04-03 PROCEDURE — 83605 ASSAY OF LACTIC ACID: CPT

## 2023-04-03 PROCEDURE — 99285 EMERGENCY DEPT VISIT HI MDM: CPT

## 2023-04-03 PROCEDURE — 80053 COMPREHEN METABOLIC PANEL: CPT

## 2023-04-03 RX ORDER — LEVETIRACETAM 100 MG/ML
SOLUTION ORAL
Qty: 175 ML | Refills: 0 | Status: SHIPPED | OUTPATIENT
Start: 2023-04-03 | End: 2023-04-10

## 2023-04-03 RX ORDER — LEVETIRACETAM 500 MG/5ML
500 INJECTION, SOLUTION, CONCENTRATE INTRAVENOUS ONCE
Status: DISCONTINUED | OUTPATIENT
Start: 2023-04-03 | End: 2023-04-03 | Stop reason: HOSPADM

## 2023-04-03 RX ADMIN — IOPAMIDOL 100 ML: 755 INJECTION, SOLUTION INTRAVENOUS at 15:30

## 2023-04-03 RX ADMIN — SODIUM CHLORIDE 1000 ML: 9 INJECTION, SOLUTION INTRAVENOUS at 14:34

## 2023-04-03 NOTE — DISCHARGE INSTRUCTIONS
Follow-up with your neurologist tomorrow. You should call first thing in the morning to at least talk to the team about the [address]. You can take your evening dose when you get home. I prescribed you the liquid Keppra. For tomorrow morning you should take the 1500 mg unless you can get in touch with your neurologist before your morning dose, for recommendations. They may wish to take you back up to a higher dose, keep the dose the same or changing to a different medication. You should discuss this with them when you call tomorrow. Return for recurrent seizures or any worsening or any new complaints.

## 2023-04-03 NOTE — ED TRIAGE NOTES
HX OF DEMENTIA BUT ANSWERING ALL QUESTIONS APPROP, SYNCOPAL EPISODE WHILE OUT TO EAT, WITNESSED BY EMS UP TO APPROX 3 MINS,

## 2023-04-03 NOTE — ED PROVIDER NOTES
[facility] EMERGENCY DEPT   EMERGENCY DEPARTMENT HISTORY AND PHYSICAL EXAM      Date: 4/3/2023  Patient Name: Juan Jose Fritz      History of Presenting Illness     Chief Complaint   Patient presents with    Syncope       History Provided By: Patient    Means of Arrival: by ambulance    Location/Duration/Severity/Modifying factors   Patient is a 67-year-old female with a past medical history of congestive heart failure, breast cancer, arthritis, hypertension, diabetes, epilepsy presenting to the emergency room with complaint of loss of consciousness. Symptoms started at around 1240, about 30 minutes before she came in. Was at a restaurant [facility]) where the patient was eating lunch. She had finished her lunch and then got up to go to the bathroom and upon returning from the bathroom she began feeling dizzy and she advised her family of this and they were able to assist her to prevent her from falling but she did pass out and go to the ground. Reported loss of consciousness time was about 10 minutes. There was some shaking observed although it was unclear if the patient was having a seizure. Patient is on levetiracetam for her epilepsy. Family at the bedside states she has been compliant. Patient has a history of dementia and lives at home with family but able to converse appropriately. She denies any chest pain, shortness of breath or palpitations. Upon EMS arrival they found the patient to be waking up from the event and as she sat down into a chair she passed out again. She complains only of some abdominal pain and discomfort and flank pain radiating towards the chest.      Syncope       There are no other complaints, changes, or physical findings at this time.     PCP: Kevin Devine MD    Current Facility-Administered Medications   Medication Dose Route Frequency Provider Last Rate Last Admin    levETIRAcetam (KEPPRA) injection 500 mg  500 mg IntraVENous ONCE Ratna Floor, DO         Current Outpatient Medications   Medication Sig Dispense Refill    levETIRAcetam (Keppra) 100 mg/mL solution Take 15 mL by mouth daily AND 10 mL nightly. Do all this for 7 days. 175 mL 0    lidocaine HCL 4 % ptmd 1 Patch by Apply Externally route daily. 10 Each 0    famotidine (PEPCID) 20 mg tablet       lisinopriL (PRINIVIL, ZESTRIL) 5 mg tablet       levETIRAcetam 1,000 mg tablet       meloxicam (MOBIC) 15 mg tablet       insulin glargine (LANTUS) 100 unit/mL injection 30 Units by SubCUTAneous route nightly. 1 Vial 0    pioglitazone (ACTOS) 30 mg tablet TAKE 1 TABLET BY MOUTH EVERY DAY 30 Tab 0    oxybutynin chloride XL (DITROPAN XL) 15 mg CR tablet Take 10 mg by mouth daily. atorvastatin (LIPITOR) 40 mg tablet Take 40 mg by mouth nightly. aspirin delayed-release 81 mg tablet Take  by mouth daily. Past History     Past Medical History:  Past Medical History:   Diagnosis Date    Arthritis     Breast cancer ([facility] [address]     Diabetes ([facility] [address]     Hyperlipidemia     Hypertension     Seizure ([facility] [address]     Seizures ([facility] [address]     Stroke [facility])        Past Surgical History:  Past Surgical History:   Procedure Laterality Date    HX BILATERAL MASTECTOMY Left     HX CHOLECYSTECTOMY  2014    HX UROLOGICAL  02/04/2022    CYSTOSCOPY        Family History:  Family History   Problem Relation Age of Onset    Cancer Mother         stomach cancer    Diabetes Mother     Hypertension Mother     Diabetes Sister     Hypertension Sister     Cancer Sister     Cancer Other        Social History:  Social History     Tobacco Use    Smoking status: Former     Types: Cigarettes    Smokeless tobacco: Never   Vaping Use    Vaping Use: Never used   Substance Use Topics    Alcohol use: Not Currently    Drug use: Never       Allergies:   Allergies   Allergen Reactions    Oxycodone-Acetaminophen Unknown (comments)    Vicodin [Hydrocodone-Acetaminophen] Unknown (comments)       Medications:  Discharge Medication List as of 4/3/2023  6:36 PM        CONTINUE these medications which have NOT CHANGED    Details   lidocaine HCL 4 % ptmd 1 Patch by Apply Externally route daily. , Normal, Disp-10 Each, R-0      famotidine (PEPCID) 20 mg tablet Historical Med      lisinopriL (PRINIVIL, ZESTRIL) 5 mg tablet Historical Med      levETIRAcetam 1,000 mg tablet Historical Med      meloxicam (MOBIC) 15 mg tablet Historical Med      insulin glargine (LANTUS) 100 unit/mL injection 30 Units by SubCUTAneous route nightly., Normal, Disp-1 Vial, R-0      pioglitazone (ACTOS) 30 mg tablet TAKE 1 TABLET BY MOUTH EVERY DAY, Normal, Disp-30 Tab, R-0      oxybutynin chloride XL (DITROPAN XL) 15 mg CR tablet Take 10 mg by mouth daily. , Historical Med      atorvastatin (LIPITOR) 40 mg tablet Take 40 mg by mouth nightly., Historical Med      aspirin delayed-release 81 mg tablet Take  by mouth daily. , Historical Med             Social Determinants of Health:  Social Determinants of Health     Tobacco Use: Medium Risk    Smoking Tobacco Use: Former    Smokeless Tobacco Use: Never    Passive Exposure: Not on file   Alcohol Use: Not on file   Financial Resource Strain: Not on file   Food Insecurity: Not on file   Transportation Needs: Not on file   Physical Activity: Not on file   Stress: Not on file   Social Connections: Not on file   Intimate Partner Violence: Not on file   Depression: Not at risk    PHQ-2 Score: 0   Housing Stability: Not on file     Good access to primary and/or specialist care. Reports generally stable financial, home and living environment. and [facility] by [facility]    Physical Exam     ED Triage Vitals [04/03/23 1316]   ED Encounter Vitals Group      BP (!) 142/62      Pulse (Heart Rate) 79      Resp Rate 14      Temp 97.5 °F (36.4 °C)      Temp src       O2 Sat (%) 98 %      Weight 192 lb      Height 5' 4\"      Physical Exam  Vitals and nursing note reviewed. Constitutional:       General: She is not in acute distress. Appearance: Normal appearance.    HENT:      Head: Normocephalic and atraumatic. Right Ear: External ear normal.      Left Ear: External ear normal.      Nose: Nose normal. No congestion or rhinorrhea. Mouth/Throat:      Mouth: Mucous membranes are moist.      Pharynx: Oropharynx is clear. Eyes:      Conjunctiva/sclera: Conjunctivae normal.   Cardiovascular:      Rate and Rhythm: Normal rate and regular rhythm. Pulses: Normal pulses. Heart sounds: Normal heart sounds. No murmur heard. Pulmonary:      Effort: Pulmonary effort is normal.      Breath sounds: Normal breath sounds. No wheezing, rhonchi or rales. Abdominal:      General: Abdomen is flat. There is distension. Tenderness: There is abdominal tenderness. There is no guarding or rebound. Comments: Midline hernia swelling with generalized tenderness and tenderness along the flank. Musculoskeletal:         General: No swelling or tenderness. Normal range of motion. Cervical back: Normal range of motion and neck supple. No rigidity or tenderness. Right lower leg: No edema. Left lower leg: No edema. Skin:     General: Skin is warm and dry. Capillary Refill: Capillary refill takes less than 2 seconds. Findings: No rash. Neurological:      General: No focal deficit present. Mental Status: She is alert. Comments: Chronic right lower extremity weakness, symmetric upper extremity strength. Cranial nerves II through XII are intact.        Lab and Diagnostic Study Results     Labs -  Recent Results (from the past 24 hour(s))   CBC WITH AUTOMATED DIFF    Collection Time: 04/03/23  1:28 PM   Result Value Ref Range    WBC 7.3 3.6 - 11.0 K/uL    RBC 4.00 3.80 - 5.20 M/uL    HGB 12.6 11.5 - 16.0 g/dL    HCT 38.5 35.0 - 47.0 %    MCV 96.3 80.0 - 99.0 FL    MCH 31.5 26.0 - 34.0 PG    MCHC 32.7 30.0 - 36.5 g/dL    RDW 11.4 (L) 11.5 - 14.5 %    PLATELET 352 622 - 595 K/uL    MPV 11.2 8.9 - 12.9 FL    NRBC 0.0 0.0  WBC    ABSOLUTE NRBC 0.00 0.00 - 0.01 K/uL    NEUTROPHILS 59 32 - 75 %    LYMPHOCYTES 32 12 - 49 %    MONOCYTES 7 5 - 13 %    EOSINOPHILS 2 0 - 7 %    BASOPHILS 0 0 - 1 %    IMMATURE GRANULOCYTES 0 0 - 0.5 %    ABS. NEUTROPHILS 4.3 1.8 - 8.0 K/UL    ABS. LYMPHOCYTES 2.3 0.8 - 3.5 K/UL    ABS. MONOCYTES 0.5 0.0 - 1.0 K/UL    ABS. EOSINOPHILS 0.2 0.0 - 0.4 K/UL    ABS. BASOPHILS 0.0 0.0 - 0.1 K/UL    ABS. IMM. GRANS. 0.0 0.00 - 0.04 K/UL    DF AUTOMATED     METABOLIC PANEL, COMPREHENSIVE    Collection Time: 04/03/23  1:28 PM   Result Value Ref Range    Sodium 137 136 - 145 mmol/L    Potassium 3.9 3.5 - 5.1 mmol/L    Chloride 104 97 - 108 mmol/L    CO2 26 21 - 32 mmol/L    Anion gap 7 5 - 15 mmol/L    Glucose 224 (H) 65 - 100 mg/dL    BUN 8 6 - 20 mg/dL    Creatinine 0.71 0.55 - 1.02 mg/dL    BUN/Creatinine ratio 11 (L) 12 - 20      eGFR >60 >60 ml/min/1.73m2    Calcium 9.0 8.5 - 10.1 mg/dL    Bilirubin, total 0.6 0.2 - 1.0 mg/dL    AST (SGOT) 15 15 - 37 U/L    ALT (SGPT) 22 12 - 78 U/L    Alk.  phosphatase 80 45 - 117 U/L    Protein, total 6.6 6.4 - 8.2 g/dL    Albumin 3.4 (L) 3.5 - 5.0 g/dL    Globulin 3.2 2.0 - 4.0 g/dL    A-G Ratio 1.1 1.1 - 2.2     TROPONIN-HIGH SENSITIVITY    Collection Time: 04/03/23  1:28 PM   Result Value Ref Range    Troponin-High Sensitivity 6 0 - 51 ng/L   NT-PRO BNP    Collection Time: 04/03/23  1:28 PM   Result Value Ref Range    NT pro-BNP 29 <125 pg/mL   PROTHROMBIN TIME + INR    Collection Time: 04/03/23  1:28 PM   Result Value Ref Range    Prothrombin time 14.0 11.9 - 14.6 sec    INR 1.1 0.9 - 1.1     PTT    Collection Time: 04/03/23  1:28 PM   Result Value Ref Range    aPTT 29.8 21.2 - 34.1 sec    aPTT, therapeutic range   82 - 109 sec   TYPE & SCREEN    Collection Time: 04/03/23  1:28 PM   Result Value Ref Range    Crossmatch Expiration 04/06/2023,2359     ABO/Rh(D) B Positive     Antibody screen Negative    MAGNESIUM    Collection Time: 04/03/23  1:28 PM   Result Value Ref Range    Magnesium 2.1 1.6 - 2.4 mg/dL CK    Collection Time: 04/03/23  1:29 PM   Result Value Ref Range     26 - 192 U/L   LACTIC ACID    Collection Time: 04/03/23  1:29 PM   Result Value Ref Range    Lactic acid 1.5 0.4 - 2.0 mmol/L   TROPONIN-HIGH SENSITIVITY    Collection Time: 04/03/23  3:40 PM   Result Value Ref Range    Troponin-High Sensitivity 9 0 - 51 ng/L         Radiologic Studies -   CT HEAD WO CONT   Final Result   No acute intracranial finding. CT CHEST ABD PELV W CONT   Final Result   No acute process or traumatic injury on CT. XR CHEST SNGL V   Final Result   No acute cardiopulmonary disease radiographically. .  . Non-plain film images such as CT, Ultrasound and MRI are read by the radiologist. Plain radiographic images are visualized and preliminarily interpreted by the ED Provider with the below findings:    Please see the full medical record for comprehensive list of labs and imaging obtained during the visit. All labs and imaging studies were personally reviewed. My intepretation(s) if applicable are below:     EKG interpretation(s): See ED Course for interpretation of my EKG(s)    Xray Interpretations(s): N/A    Cardiac Monitor:  Cardiac Monitor Interpretation:     Rate: 65-75 BPM,   Rhythm: Sinus Rhythm    Pulse Oximetry Interpretation: 94% on Room Air      Medical Decision Making and ED Course   - I am the first and primary provider for this patient AND AM THE PRIMARY PROVIDER OF RECORD. - I reviewed the vital signs, available nursing notes, past medical history, past surgical history, family history and social history. - Initial assessment performed. The patients presenting problems have been discussed, and the staff are in agreement with the care plan formulated and outlined with them. I have encouraged them to ask questions as they arise throughout their visit. Vital Signs-Reviewed the patient's vital signs.     Patient was given the following medications:  Medications levETIRAcetam (KEPPRA) injection 500 mg (has no administration in time range)   sodium chloride 0.9 % bolus infusion 1,000 mL (0 mL IntraVENous IV Completed 4/3/23 1634)   iopamidoL (ISOVUE-370) 370 mg iodine /mL (76 %) injection 100 mL (100 mL IntraVENous Given 4/3/23 1530)       Patient Vitals for the past 24 hrs:   Temp Pulse Resp BP SpO2   04/03/23 1840 -- 66 20 121/67 --   04/03/23 1800 -- 70 17 -- 94 %   04/03/23 1730 -- 73 16 -- 96 %   04/03/23 1700 -- 76 20 -- 93 %   04/03/23 1630 -- 74 18 -- 97 %   04/03/23 1600 -- 71 17 -- 96 %   04/03/23 1500 -- 69 17 129/74 98 %   04/03/23 1430 -- 72 17 123/78 98 %   04/03/23 1400 -- 69 14 115/66 97 %   04/03/23 1330 -- 63 17 128/62 98 %   04/03/23 1316 97.5 °F (36.4 °C) 79 14 (!) 142/62 98 %       Social Determinants affecting Dx or Tx: None    Records Reviewed: Prior medical records, Previous Radiology studies, Previous Laboratory studies, Previous EKGs, and Nursing notes    Additional Considerations:    None  Admission considered, I engaged the patient in shared decision making and they prefer to follow up with their PCP/Specialist instead. Provider Notes (Medical Decision Making):     MDM  Number of Diagnoses or Management Options  Dysphagia, unspecified type  History of epilepsy  Recurrent seizures ([facility] [address]  Diagnosis management comments: Differential for syncope would include vasovagal syncope, orthostasis, dehydration, arrhythmia such as Brugada, Kristel-Parkinson-White, arrhythmogenic right ventricular dysplasia, seizure, dehydration, CHF, PE, anemia, etc.      We will check EKG to assess for arrhythmia. Will check CT chest abdomen pelvis given complaints of abdominal pain. ED Course:         ED Course as of 04/03/23 2107   [facility] Apr 03, 2023   1356 Interpreted by Cuca Carrion, DO  Normal sinus rhythm, rate of 66. Normal CO, QRS and QTc intervals. Normal axis. No ST segment elevation or depression. Overall normal sinus rhythm and normal EKG.    [AMANDA] 0538 Patient was reassessed, discussed with family regarding the results thus far as well as their observations of what happened at the restaurant. The patient is a grandson is here with her now and he is very familiar with her and has spent a lot of time with her and is very familiar with her medical history and her current issues. Anderson Rodriguez states that she had what was identified as a seizure, and stating this because he knows the seizures that she has had in the past.  States it looks very much like a seizure. The patient has not been having a lot of issues with dysphagia. She had stroke in the past and her dementia is thought to be vascular in nature. The patient affirmed that she has been taking her medicine although it is unsure how reliable this is. She was seen at a [facility] facility in [place] recently and had a swallow study performed which showed that she really should only be having a special dysphagia diet. She states that she has been taking her medication however it family states she has been not getting much nutrition. They have been not been able to do any sort of modified diet for her so far. Grandson also states he is concerned about the best level of care that she can get, he does not want the patient to go to a nursing home but is questioning if that is what she needs or not. [AMANDA]   2073 The patient's labs have been reviewed. No sign of clinically significant Anemia on CBC, the WBC is not significantly elevated, so sepsis or severe infection is less likely. The CMP reveals grossly normal electrolytes, renal and liver function, making significant dehydration, renal failure or liver failure unlikely. [AMANDA]   7959 CT chest abdomen pelvis negative, CT head negative. Negative troponin x2. Unlikely pulmonary embolism given lack of tachycardia hypoxia or shortness of breath.   This sounds like this is likely a seizure reviewed the notes from the patient's stay back in 2021 and seems to be a nearly identical event as today. This favors seizure over syncope at this point. [AMANDA]   7046 Patient reassessed. I offered admission to the patient and her family. Patient is adamant that she wants to go home. I would like to check a urine sample however evidently someone dumped out the bedpan. I want to rule out any infection. Patient is on 1000 mg of Keppra twice daily reportedly this was cut back from 1500 twice daily and prior to that from 1750. Question if patient needs to go back up on the dosing. Discussed this at length with the family, for the short-term we can have her take 1500 tomorrow in the a.m. and 1000 in the afternoon but advised to call the neurologist first thing in the morning to discuss this further. They also state that the pills are too big for her and that she did well with liquids in the hospital so I will prescribed her the liquid Keppra for short-term to take either the liquid or the pills but not both. [AMANDA]   6837 Patient declining to stay for Urine sample and requesting discharge ASAP [AMANDA]      ED Course User Index  [AMANDA] Caitlin Umana, DO     This appears to be an acute condition.  ------------------------------------------------------------------------------------------------------------    Smoking Cessation: N/A    Consultations:       Consultations: - NONE    See the ED course section, if applicable for details on the content of consultations requested. Procedures and Critical Care       Performed by: Fátima Beal DO    Procedures             CRITICAL CARE NOTE :  1:37 PM  CRITICAL CARE TIME: N/a    Fátima Beal DO        Disposition     DISPOSITION: Discharged Home    DISCHARGE: At this time, patient is stable and appropriate for discharge home. Patient demonstrates understanding of current diagnoses and is in agreement with the treatment plan.   They are advised that while the likelihood of serious underlying condition is low at this point given the evaluation performed today, we cannot fully rule it out. They are advised to immediately return with any new symptoms or worsening of current condition. Any Incidental findings were noted on the patient's discharge paperwork as well as verbally directly to the patient, and the appropriate follow-up was given to the patient as far as instructions on testing needed as well as the timeframe. All questions have been answered. Patient is given educational material regarding their diagnoses, including danger symptoms and when to return to the ED. Diagnosis:   1. Recurrent seizures ([address]    2. History of epilepsy    3. Dysphagia, unspecified type        Follow-up Information       Follow up With Specialties Details Why Contact Info    Your Neurologist  Schedule an appointment as soon as possible for a visit in 1 day      [address] [facility] EMERGENCY DEPT Emergency Medicine  As needed, If symptoms worsen; or [facility] Emergency Department [address] [facility] [address]  [facility] [address], [address]ose MD Family Medicine Call in 1 day  [address] [facility]  [phone]              Discharge Medication List as of 4/3/2023  6:36 PM        START taking these medications    Details   levETIRAcetam (Keppra) 100 mg/mL solution Take 15 mL by mouth daily AND 10 mL nightly. Do all this for 7 days. , Normal, Disp-175 mL, R-0           CONTINUE these medications which have NOT CHANGED    Details   lidocaine HCL 4 % ptmd 1 Patch by Apply Externally route daily. , Normal, Disp-10 Each, R-0      famotidine (PEPCID) 20 mg tablet Historical Med      lisinopriL (PRINIVIL, ZESTRIL) 5 mg tablet Historical Med      levETIRAcetam 1,000 mg tablet Historical Med      meloxicam (MOBIC) 15 mg tablet Historical Med      insulin glargine (LANTUS) 100 unit/mL injection 30 Units by SubCUTAneous route nightly., Normal, Disp-1 Vial, R-0      pioglitazone (ACTOS) 30 mg tablet TAKE 1 TABLET BY MOUTH EVERY DAY, Normal, Disp-30 Tab, R-0 oxybutynin chloride XL (DITROPAN XL) 15 mg CR tablet Take 10 mg by mouth daily. , Historical Med      atorvastatin (LIPITOR) 40 mg tablet Take 40 mg by mouth nightly., Historical Med      aspirin delayed-release 81 mg tablet Take  by mouth daily. , Historical Med                       Diagnosis     Clinical Impression:   1. Recurrent seizures ([facility] [address]    2. History of epilepsy    3. Dysphagia, unspecified type        Attestations:    Eun Mace, DO    Please note that this dictation was completed with afterBOT, the computer voice recognition software. Quite often unanticipated grammatical, syntax, homophones, and other interpretive errors are inadvertently transcribed by the computer software. Please disregard these errors. Please excuse any errors that have escaped final proofreading. Thank you.

## 2023-04-03 NOTE — ED NOTES
Pt refusing IV keppra at this time. Pt requesting to wait until her keppra level comes back as she is concerned about overdosing. Family present at bedside during this. This RN explained rationale for still take keppra and Pt still declining at this time. Will continue to monitor.

## 2023-04-03 NOTE — ED NOTES
Airway: Patent, Managing oral secretions, and No obstruction    Respiratory: Normal Rate , Normal Depth, No acute Distress, and Speaking in full sentences    Cardiac: WNL and ECG Rhythm is NSR    Neuro: pt reported to be eating, didn't feel well, went to bathroom, when came back from bathroom had syncopal episode. Possible SZ like activity. Pt slightly confused after. Pt had secondary syncopal episode. Arrives alert and oriented. Noted to have rt sided weakness, hx of previous CVA with rt sided deficit.      GI: Abdominal pain to middle of abdomen - pt has hx of hernia    : WNL    Muscle/Skeletal: WNL

## 2023-04-04 LAB
ATRIAL RATE: 64 BPM
ATRIAL RATE: 66 BPM
CALCULATED P AXIS, ECG09: 13 DEGREES
CALCULATED P AXIS, ECG09: 34 DEGREES
CALCULATED R AXIS, ECG10: 16 DEGREES
CALCULATED R AXIS, ECG10: 42 DEGREES
CALCULATED T AXIS, ECG11: 17 DEGREES
CALCULATED T AXIS, ECG11: 38 DEGREES
DIAGNOSIS, 93000: NORMAL
DIAGNOSIS, 93000: NORMAL
P-R INTERVAL, ECG05: 172 MS
P-R INTERVAL, ECG05: 174 MS
Q-T INTERVAL, ECG07: 404 MS
Q-T INTERVAL, ECG07: 414 MS
QRS DURATION, ECG06: 82 MS
QRS DURATION, ECG06: 88 MS
QTC CALCULATION (BEZET), ECG08: 423 MS
QTC CALCULATION (BEZET), ECG08: 427 MS
VENTRICULAR RATE, ECG03: 64 BPM
VENTRICULAR RATE, ECG03: 66 BPM

## 2023-04-05 LAB — LEVETIRACETAM SERPL-MCNC: 21.5 UG/ML (ref 10–40)

## 2023-04-26 ENCOUNTER — OFFICE VISIT (OUTPATIENT)
Age: 73
End: 2023-04-26
Payer: MEDICARE

## 2023-04-26 VITALS
HEIGHT: 64 IN | RESPIRATION RATE: 20 BRPM | WEIGHT: 193 LBS | HEART RATE: 70 BPM | TEMPERATURE: 98 F | BODY MASS INDEX: 32.95 KG/M2 | OXYGEN SATURATION: 98 % | SYSTOLIC BLOOD PRESSURE: 110 MMHG | DIASTOLIC BLOOD PRESSURE: 58 MMHG

## 2023-04-26 DIAGNOSIS — E66.9 OBESITY (BMI 30-39.9): ICD-10-CM

## 2023-04-26 DIAGNOSIS — M62.08 DIASTASIS OF RECTUS ABDOMINIS: Primary | ICD-10-CM

## 2023-04-26 PROCEDURE — 99203 OFFICE O/P NEW LOW 30 MIN: CPT | Performed by: SURGERY

## 2023-04-26 PROCEDURE — 3074F SYST BP LT 130 MM HG: CPT | Performed by: SURGERY

## 2023-04-26 PROCEDURE — 3078F DIAST BP <80 MM HG: CPT | Performed by: SURGERY

## 2023-04-26 PROCEDURE — 1123F ACP DISCUSS/DSCN MKR DOCD: CPT | Performed by: SURGERY

## 2023-04-30 ASSESSMENT — ENCOUNTER SYMPTOMS
CHEST TIGHTNESS: 0
CONSTIPATION: 0
VOMITING: 0
ABDOMINAL DISTENTION: 1
ABDOMINAL PAIN: 0

## 2023-12-26 ENCOUNTER — TELEPHONE (OUTPATIENT)
Age: 73
End: 2023-12-26

## 2023-12-26 NOTE — TELEPHONE ENCOUNTER
Pt's daughter called stating they believed pt saw a  At our office and wanted to know if she was due for an appointment. I checked her last visit (2022) and the note recommended she come back in for follow up in February of this year (2023). That appt was never scheduled and the daughter states pt has not been having symptoms, but would like to know if they need to schedule follow up since the pt is still taking oxybutynin. Should pt be seen by dr. Del Valle or nilda, or continue care  prn?

## 2024-04-01 PROBLEM — N39.0 RECURRENT UTI: Status: RESOLVED | Noted: 2022-01-11 | Resolved: 2024-04-01

## 2024-04-01 PROBLEM — R81 GLUCOSURIA: Status: RESOLVED | Noted: 2022-01-11 | Resolved: 2024-04-01

## 2024-04-05 ENCOUNTER — OFFICE VISIT (OUTPATIENT)
Age: 74
End: 2024-04-05
Payer: MEDICARE

## 2024-04-05 VITALS
RESPIRATION RATE: 16 BRPM | HEIGHT: 64 IN | OXYGEN SATURATION: 96 % | HEART RATE: 64 BPM | SYSTOLIC BLOOD PRESSURE: 141 MMHG | BODY MASS INDEX: 32.95 KG/M2 | WEIGHT: 193 LBS | DIASTOLIC BLOOD PRESSURE: 53 MMHG

## 2024-04-05 DIAGNOSIS — N39.41 URGE INCONTINENCE: Primary | ICD-10-CM

## 2024-04-05 DIAGNOSIS — N30.00 ACUTE CYSTITIS WITHOUT HEMATURIA: ICD-10-CM

## 2024-04-05 PROCEDURE — 99214 OFFICE O/P EST MOD 30 MIN: CPT | Performed by: UROLOGY

## 2024-04-05 PROCEDURE — 3077F SYST BP >= 140 MM HG: CPT | Performed by: UROLOGY

## 2024-04-05 PROCEDURE — 1123F ACP DISCUSS/DSCN MKR DOCD: CPT | Performed by: UROLOGY

## 2024-04-05 PROCEDURE — 3078F DIAST BP <80 MM HG: CPT | Performed by: UROLOGY

## 2024-04-05 RX ORDER — OXYBUTYNIN CHLORIDE 15 MG/1
15 TABLET, EXTENDED RELEASE ORAL DAILY
Qty: 90 TABLET | Refills: 3 | Status: SHIPPED | OUTPATIENT
Start: 2024-04-05

## 2024-04-05 RX ORDER — LOSARTAN POTASSIUM 25 MG/1
25 TABLET ORAL DAILY
COMMUNITY

## 2024-04-05 NOTE — PROGRESS NOTES
Chief Complaint   Patient presents with    Annual Exam     Urinate for a long time     1. Have you been to the ER, urgent care clinic since your last visit?  Hospitalized since your last visit? Yes 2/2024 seizure    2. Have you seen or consulted any other health care providers outside of the [facility] since your last visit?  Include any pap smears or colon screening. No  BP (!) 141/53 (Site: Left Upper Arm, Position: Sitting, Cuff Size: Medium Adult)   Pulse 64   Resp 16   Ht 1.626 m (5' 4\")   Wt 87.5 kg (193 lb)   SpO2 96%   BMI 33.13 kg/m²     
mouth every morning   Yes Provider, MD Cydney   aspirin 81 MG EC tablet Take by mouth daily   Yes Automatic Reconciliation, Ar   atorvastatin (LIPITOR) 40 MG tablet Take 1 tablet by mouth nightly   Yes Automatic Reconciliation, Ar   levETIRAcetam (KEPPRA) 1000 MG tablet ceived the following from [facility]: Outside name: levETIRAcetam 1,000 mg tablet 11/15/21  Yes Automatic Reconciliation, Ar   meloxicam (MOBIC) 15 MG tablet ceived the following from [facility]: Outside name: meloxicam (MOBIC) 15 mg tablet 12/2/21  Yes Automatic Reconciliation, Ar   pioglitazone (ACTOS) 30 MG tablet Take 1 tablet by mouth daily 5/23/20  Yes Automatic Reconciliation, Ar   insulin lispro protamine & lispro (HUMALOG MIX) (75-25) 100 UNIT per ML SUSP injection vial Inject into the skin 2 times daily (with meals)  Patient not taking: Reported on 4/5/2024    ProviderCydney MD   famotidine (PEPCID) 20 MG tablet ceived the following from [facility]: Outside name: famotidine (PEPCID) 20 mg tablet  Patient not taking: Reported on 4/5/2024 1/7/22   Automatic Reconciliation, Ar   insulin glargine (LANTUS) 100 UNIT/ML injection vial Inject 30 Units into the skin  Patient not taking: Reported on 4/5/2024 8/21/21   Automatic Reconciliation, Ar   lisinopril (PRINIVIL;ZESTRIL) 5 MG tablet ceived the following from [facility]: Outside name: lisinopriL (PRINIVIL, ZESTRIL) 5 mg tablet  Patient not taking: Reported on 4/5/2024 12/8/21   Automatic Reconciliation, Ar     Past Medical History:  PMHx (including negatives):  has a past medical history of CAD (coronary artery disease), Cerebrovascular disease, CHF (congestive heart failure) (HCC), Diabetes mellitus (HCC), Hyperlipidemia, Hypertension, and Seizures (HCC).   PSurgHx:  has a past surgical history that includes Partial hysterectomy; Cholecystectomy; and eye surgery.  PSocHx:  reports that she has never smoked. She has

## 2024-04-08 LAB
BILIRUBIN, URINE, POC: NEGATIVE
BLOOD URINE, POC: NEGATIVE
GLUCOSE URINE, POC: 500
KETONES, URINE, POC: NEGATIVE
LEUKOCYTE ESTERASE, URINE, POC: ABNORMAL
NITRITE, URINE, POC: POSITIVE
PH, URINE, POC: 6.5 (ref 4.6–8)
PROTEIN,URINE, POC: NEGATIVE
SPECIFIC GRAVITY, URINE, POC: 1.01 (ref 1–1.03)
URINALYSIS CLARITY, POC: ABNORMAL
URINALYSIS COLOR, POC: YELLOW
UROBILINOGEN, POC: ABNORMAL

## 2024-04-09 ENCOUNTER — TELEPHONE (OUTPATIENT)
Age: 74
End: 2024-04-09

## 2024-04-09 NOTE — TELEPHONE ENCOUNTER
Spoke with patient she will discard the 10 mg and start the 15 mg.         Her RX for oxybutynin was refilled.  It looks like he may have changed her from 10 mg to 15 mg.  If that is her question, she can finish the old and then start the new OR discard the 10 mg and start the 15 mg dose.    I do not see any additional medications, so I am unsure if this is the issue.  I tried to call her back, but her mailbox is full and not accepting voice mails.  She also does not have [employer].    Can you please try to reach her again and clarify what her question is?

## 2024-04-09 NOTE — TELEPHONE ENCOUNTER
Her RX for oxybutynin was refilled.  It looks like he may have changed her from 10 mg to 15 mg.  If that is her question, she can finish the old and then start the new OR discard the 10 mg and start the 15 mg dose.    I do not see any additional medications, so I am unsure if this is the issue.  I tried to call her back, but her mailbox is full and not accepting voice mails.  She also does not have [employer].    Can you please try to reach her again and clarify what her question is?

## 2024-04-09 NOTE — TELEPHONE ENCOUNTER
Pt called regarding her Rx Oxybutynin. She stated that she received her refill yesterday and would like to know if she needs to finish her old Rx before starting that new one. She wasn't sure what to do. She asked that a nurse call her a talk to her about what needs to be done.

## 2024-04-10 LAB — BACTERIA UR CULT: ABNORMAL

## 2024-04-11 DIAGNOSIS — N30.90 CYSTITIS: Primary | ICD-10-CM

## 2024-04-11 RX ORDER — NITROFURANTOIN 25; 75 MG/1; MG/1
100 CAPSULE ORAL 2 TIMES DAILY
Qty: 10 CAPSULE | Refills: 0 | Status: SHIPPED | OUTPATIENT
Start: 2024-04-11 | End: 2024-04-16

## 2024-06-03 NOTE — ED TRIAGE NOTES
Takes blood thinners, stood up fell striking head, pt unresponsive for unk period of time, became alert and oriented but complaining of headache. Pt stood up multiple times with no issues, stood up again and went completely unresponsive. Abd distended. Hx of CVA.
Performed

## 2025-04-29 ENCOUNTER — OFFICE VISIT (OUTPATIENT)
Age: 75
End: 2025-04-29
Payer: MEDICARE

## 2025-04-29 VITALS
HEIGHT: 64 IN | BODY MASS INDEX: 35.17 KG/M2 | SYSTOLIC BLOOD PRESSURE: 116 MMHG | DIASTOLIC BLOOD PRESSURE: 56 MMHG | WEIGHT: 206 LBS | HEART RATE: 67 BPM

## 2025-04-29 DIAGNOSIS — Z79.4 TYPE 2 DIABETES MELLITUS WITH HYPERGLYCEMIA, WITH LONG-TERM CURRENT USE OF INSULIN (HCC): ICD-10-CM

## 2025-04-29 DIAGNOSIS — E11.65 TYPE 2 DIABETES MELLITUS WITH HYPERGLYCEMIA, WITH LONG-TERM CURRENT USE OF INSULIN (HCC): ICD-10-CM

## 2025-04-29 DIAGNOSIS — N39.41 URGE INCONTINENCE: Primary | ICD-10-CM

## 2025-04-29 DIAGNOSIS — R82.81 PYURIA: ICD-10-CM

## 2025-04-29 LAB
BILIRUBIN, URINE, POC: NEGATIVE
BLOOD URINE, POC: ABNORMAL
GLUCOSE URINE, POC: 1000
KETONES, URINE, POC: NEGATIVE
LEUKOCYTE ESTERASE, URINE, POC: ABNORMAL
NITRITE, URINE, POC: NEGATIVE
PH, URINE, POC: 6.5 (ref 4.6–8)
PROTEIN,URINE, POC: NEGATIVE
SPECIFIC GRAVITY, URINE, POC: 1.01 (ref 1–1.03)
URINALYSIS CLARITY, POC: CLEAR
URINALYSIS COLOR, POC: YELLOW
UROBILINOGEN, POC: ABNORMAL

## 2025-04-29 PROCEDURE — 99214 OFFICE O/P EST MOD 30 MIN: CPT | Performed by: UROLOGY

## 2025-04-29 PROCEDURE — 81003 URINALYSIS AUTO W/O SCOPE: CPT | Performed by: UROLOGY

## 2025-04-29 PROCEDURE — 1123F ACP DISCUSS/DSCN MKR DOCD: CPT | Performed by: UROLOGY

## 2025-04-29 PROCEDURE — 3078F DIAST BP <80 MM HG: CPT | Performed by: UROLOGY

## 2025-04-29 PROCEDURE — 1159F MED LIST DOCD IN RCRD: CPT | Performed by: UROLOGY

## 2025-04-29 PROCEDURE — 3074F SYST BP LT 130 MM HG: CPT | Performed by: UROLOGY

## 2025-04-29 RX ORDER — OXYBUTYNIN CHLORIDE 15 MG/1
15 TABLET, EXTENDED RELEASE ORAL DAILY
Qty: 90 TABLET | Refills: 3 | Status: SHIPPED | OUTPATIENT
Start: 2025-04-29

## 2025-04-29 RX ORDER — PEN NEEDLE, DIABETIC 32GX 5/32"
NEEDLE, DISPOSABLE MISCELLANEOUS
COMMUNITY
Start: 2025-03-13

## 2025-04-29 RX ORDER — LIDOCAINE 50 MG/G
PATCH TOPICAL
COMMUNITY
Start: 2024-10-14

## 2025-04-29 RX ORDER — POTASSIUM CHLORIDE 1500 MG/1
TABLET, EXTENDED RELEASE ORAL
COMMUNITY

## 2025-04-29 RX ORDER — METFORMIN HYDROCHLORIDE EXTENDED-RELEASE TABLETS 1000 MG/1
TABLET, FILM COATED, EXTENDED RELEASE ORAL
COMMUNITY
End: 2025-04-29 | Stop reason: ALTCHOICE

## 2025-04-29 RX ORDER — BLOOD SUGAR DIAGNOSTIC
STRIP MISCELLANEOUS
COMMUNITY
Start: 2025-02-11

## 2025-04-29 RX ORDER — SULFAMETHOXAZOLE AND TRIMETHOPRIM 800; 160 MG/1; MG/1
TABLET ORAL
COMMUNITY
Start: 2025-04-01

## 2025-04-29 RX ORDER — ACYCLOVIR 400 MG/1
TABLET ORAL
COMMUNITY
Start: 2025-03-16

## 2025-04-29 RX ORDER — LOSARTAN POTASSIUM 50 MG/1
TABLET ORAL
COMMUNITY
Start: 2025-04-22

## 2025-04-29 RX ORDER — INSULIN GLARGINE AND LIXISENATIDE 100; 33 U/ML; UG/ML
INJECTION, SOLUTION SUBCUTANEOUS
COMMUNITY

## 2025-04-29 RX ORDER — FUROSEMIDE 40 MG/1
TABLET ORAL
COMMUNITY

## 2025-04-29 NOTE — ASSESSMENT & PLAN NOTE
Stable to improved on oxybutynin.  Continue medication.  If she has symptoms of UTI then we will treat.  She was advised to look for symptoms of worse urinary frequency, urgency, dysuria or hematuria.

## 2025-04-29 NOTE — ASSESSMENT & PLAN NOTE
Improving control.  Still with high BS.  Can affect urgency and UTIs.   Advised on a stable carbohydrate diet.

## 2025-04-29 NOTE — PROGRESS NOTES
HISTORY OF PRESENT ILLNESS  Madonna Lauren is a 74 y.o. female.   has a past medical history of CAD (coronary artery disease), Cerebrovascular disease, CHF (congestive heart failure) ([facility]), Diabetes mellitus ([facility]), Hyperlipidemia, Hypertension, and Seizures ([facility]).  has a past surgical history that includes Partial hysterectomy; Cholecystectomy; and eye surgery.  Chief Complaint   Patient presents with    Annual Exam     She has diabetes.  She as a BS monitor.  It varies 130's. HbA1c 7.9 recently per the patient, down from 8.3 in Feb.      She has stable urination.  She uses 2 pullups per day.  She has urgency and urge incontinence.  She drinks lots of water.   She is not bothered by dry mouth or constipation.  She is tolerating the oxybutynin.           1. Urge incontinence  Overview:  She has some bothersome incontinence.  She takes Ditropan XL, 15 mg.  She wears pullups, 3-4x per day.  She has problems during the day and night.    No anatomic concerns on bladder evaluation.  She probably needs to work on diabetic control.          Assessment & Plan:   Stable to improved on oxybutynin.  Continue medication.  If she has symptoms of UTI then we will treat.  She was advised to look for symptoms of worse urinary frequency, urgency, dysuria or hematuria.      Orders:  -     AMB POC URINALYSIS DIP STICK AUTO W/O MICRO  -     Culture, Urine  -     Urinalysis with Microscopic  -     oxyBUTYnin (DITROPAN XL) 15 MG extended release tablet; Take 1 tablet by mouth daily, Disp-90 tablet, R-3Normal  2. Type 2 diabetes mellitus with hyperglycemia, with long-term current use of insulin ([facility])  Assessment & Plan:  Improving control.  Still with high BS.  Can affect urgency and UTIs.   Advised on a stable carbohydrate diet.   3. Pyuria  Comments:  Incontinent so urine will likely have growth.  No change in symptoms.  She is not feeling a change in symptoms.  Likely contaminant.  Orders:  -     AMB POC URINALYSIS DIP STICK AUTO W/O

## 2025-04-30 LAB
APPEARANCE UR: CLEAR
BACTERIA #/AREA URNS HPF: ABNORMAL /[HPF]
BILIRUB UR QL STRIP: NEGATIVE
CASTS URNS QL MICRO: ABNORMAL /LPF
COLOR UR: YELLOW
EPI CELLS #/AREA URNS HPF: ABNORMAL /HPF (ref 0–10)
GLUCOSE UR QL STRIP: ABNORMAL
HGB UR QL STRIP: NEGATIVE
KETONES UR QL STRIP: NEGATIVE
LEUKOCYTE ESTERASE UR QL STRIP: ABNORMAL
MICRO URNS: ABNORMAL
NITRITE UR QL STRIP: NEGATIVE
PH UR STRIP: 6.5 [PH] (ref 5–7.5)
PROT UR QL STRIP: NEGATIVE
RBC #/AREA URNS HPF: ABNORMAL /HPF (ref 0–2)
SP GR UR STRIP: 1.02 (ref 1–1.03)
UROBILINOGEN UR STRIP-MCNC: 0.2 MG/DL (ref 0.2–1)
WBC #/AREA URNS HPF: >30 /HPF (ref 0–5)

## 2025-05-01 LAB — BACTERIA UR CULT: ABNORMAL

## 2025-05-02 ENCOUNTER — RESULTS FOLLOW-UP (OUTPATIENT)
Age: 75
End: 2025-05-02

## 2025-05-02 NOTE — RESULT ENCOUNTER NOTE
Not a clean catch urine.  More likely contaminant. Not symptomatic.  Hold on treatment unless symptomatic.

## 2025-05-26 NOTE — PROGRESS NOTES
HISTORY OF PRESENT ILLNESS  Madonna Lauren is a 74 y.o. female.  Chief Complaint   Patient presents with    Follow-up     Possible UTI:  Itching/Irritation       She has some genital itching that is worsening.  It feels like it itches when she voids.  Some dysuria.  She has a notable increase in frequency and urgency.  She is not leaking more, but the urge is much worse than normal.        Urinary Tract Infection  This is a new problem. The current episode started in the past 7 days. The problem has been gradually worsening since onset. Pertinent negatives include no hematuria or pain. Risk factors include diabetes.       UA today with leuk esterase and significant glycosuria.  No nitrites.    She is on dapagliflozin.    Per patient her most recent HbA1c was 7.9; down from 8.3.  She reports that her sugars are usually 140-170.  She sees a \"diabetic doctor\" for management.      PAST MEDICAL HISTORY:  PMHx (including negatives):  has a past medical history of CAD (coronary artery disease), Cerebrovascular disease, CHF (congestive heart failure) ([facility]), Diabetes mellitus ([facility]), Hyperlipidemia, Hypertension, and Seizures ([facility]).   PSurgHx:  has a past surgical history that includes Partial hysterectomy; Cholecystectomy; and eye surgery.  PSocHx:  reports that she has never smoked. She has never used smokeless tobacco. She reports that she does not currently use alcohol. She reports that she does not currently use drugs.     Review of Systems   Genitourinary:  Positive for dysuria, frequency and urgency. Negative for hematuria.       Physical Exam  Vitals and nursing note reviewed.   Pulmonary:      Effort: Pulmonary effort is normal.   Musculoskeletal:      Comments: Uses a cane   Neurological:      Mental Status: She is alert and oriented to person, place, and time.   Psychiatric:         Behavior: Behavior normal.     ASSESSMENT AND PLAN    Acute cystitis without hematuria  Assessment & Plan:  Acute cystitis vs effects

## 2025-05-27 ENCOUNTER — OFFICE VISIT (OUTPATIENT)
Age: 75
End: 2025-05-27
Payer: MEDICARE

## 2025-05-27 VITALS — SYSTOLIC BLOOD PRESSURE: 133 MMHG | DIASTOLIC BLOOD PRESSURE: 68 MMHG | HEART RATE: 60 BPM

## 2025-05-27 DIAGNOSIS — N30.00 ACUTE CYSTITIS WITHOUT HEMATURIA: Primary | ICD-10-CM

## 2025-05-27 DIAGNOSIS — R81 GLYCOSURIA: ICD-10-CM

## 2025-05-27 DIAGNOSIS — B37.9 YEAST INFECTION: ICD-10-CM

## 2025-05-27 LAB
BILIRUBIN, URINE, POC: NEGATIVE
BLOOD URINE, POC: ABNORMAL
GLUCOSE URINE, POC: 1000 MG/DL
KETONES, URINE, POC: NEGATIVE MG/DL
LEUKOCYTE ESTERASE, URINE, POC: ABNORMAL
NITRITE, URINE, POC: NEGATIVE
PH, URINE, POC: 5.5 (ref 4.6–8)
PROTEIN,URINE, POC: NEGATIVE MG/DL
SPECIFIC GRAVITY, URINE, POC: 1 (ref 1–1.03)
URINALYSIS CLARITY, POC: CLEAR
URINALYSIS COLOR, POC: YELLOW
UROBILINOGEN, POC: ABNORMAL MG/DL

## 2025-05-27 PROCEDURE — 3078F DIAST BP <80 MM HG: CPT | Performed by: NURSE PRACTITIONER

## 2025-05-27 PROCEDURE — 1123F ACP DISCUSS/DSCN MKR DOCD: CPT | Performed by: NURSE PRACTITIONER

## 2025-05-27 PROCEDURE — 1159F MED LIST DOCD IN RCRD: CPT | Performed by: NURSE PRACTITIONER

## 2025-05-27 PROCEDURE — 81003 URINALYSIS AUTO W/O SCOPE: CPT | Performed by: NURSE PRACTITIONER

## 2025-05-27 PROCEDURE — 99214 OFFICE O/P EST MOD 30 MIN: CPT | Performed by: NURSE PRACTITIONER

## 2025-05-27 PROCEDURE — 3075F SYST BP GE 130 - 139MM HG: CPT | Performed by: NURSE PRACTITIONER

## 2025-05-27 RX ORDER — CEFDINIR 300 MG/1
300 CAPSULE ORAL 2 TIMES DAILY
Qty: 14 CAPSULE | Refills: 0 | Status: SHIPPED | OUTPATIENT
Start: 2025-05-27 | End: 2025-06-03

## 2025-05-27 RX ORDER — FLUCONAZOLE 200 MG/1
200 TABLET ORAL DAILY
Qty: 7 TABLET | Refills: 0 | Status: SHIPPED | OUTPATIENT
Start: 2025-05-27 | End: 2025-06-03

## 2025-05-27 NOTE — ASSESSMENT & PLAN NOTE
Acute cystitis vs effects from diabetic medication.  She is on farxiga, SGLT2.  SGLT2 inhibiors are drugs used to help control diabetes. These pills work by preventing glucose from being absorbed in the kidneys. As a result, they decrease glucose in the blood and cause it to spill into the urine. SGLT2 inhibitors may increase urination and raise the risk of female yeast infections and urinary tract infections.     We did discuss this as she mentions frequent genital yeast infections.  Advised to consult with her prescribing MD and discuss alternative medications.     I will treat for both fungal and bacterial infection while we await culture reports.   160

## 2025-05-27 NOTE — PROGRESS NOTES
Chief Complaint   Patient presents with    Follow-up     Possible UTI:  Itching/Irritation    1. Have you been to the ER, urgent care clinic since your last visit?  Hospitalized since your last visit?No    2. Have you seen or consulted any other health care providers outside of the [facility] since your last visit?  Include any pap smears or colon screening. No  /68 (BP Site: Left Upper Arm, Patient Position: Sitting, BP Cuff Size: Large Adult)   Pulse 60

## 2025-05-27 NOTE — ASSESSMENT & PLAN NOTE
Genital yeast infection.  She is on a \"cream\" from PCP for this.  I am happy to re-prescribe if need be.  She reports yeast in the groin and other skin folds.    She has significant itching in the genital area.  I will treat for 7 days with fluconazole 200 mg in the event she has yeast in the urine.     She should consider changing her diabetic regimen and avoiding SGLT2 inhibitors with recurrent yeast infections as this is commonly associated.

## 2025-05-29 ENCOUNTER — RESULTS FOLLOW-UP (OUTPATIENT)
Age: 75
End: 2025-05-29

## 2025-05-29 LAB — BACTERIA UR CULT: ABNORMAL

## 2025-05-31 ENCOUNTER — APPOINTMENT (OUTPATIENT)
Facility: HOSPITAL | Age: 75
End: 2025-05-31
Payer: MEDICARE

## 2025-05-31 ENCOUNTER — HOSPITAL ENCOUNTER (EMERGENCY)
Facility: HOSPITAL | Age: 75
Discharge: HOME | End: 2025-05-31
Attending: STUDENT IN AN ORGANIZED HEALTH CARE EDUCATION/TRAINING PROGRAM
Payer: MEDICARE

## 2025-05-31 VITALS
HEART RATE: 61 BPM | DIASTOLIC BLOOD PRESSURE: 58 MMHG | TEMPERATURE: 97.8 F | BODY MASS INDEX: 34.15 KG/M2 | SYSTOLIC BLOOD PRESSURE: 140 MMHG | WEIGHT: 200 LBS | RESPIRATION RATE: 18 BRPM | OXYGEN SATURATION: 96 % | HEIGHT: 64 IN

## 2025-05-31 DIAGNOSIS — G40.919 BREAKTHROUGH SEIZURE (HCC): Primary | ICD-10-CM

## 2025-05-31 LAB
ALBUMIN SERPL-MCNC: 3.4 G/DL (ref 3.5–5)
ALBUMIN/GLOB SERPL: 1 (ref 1.1–2.2)
ALP SERPL-CCNC: 69 U/L (ref 45–117)
ALT SERPL-CCNC: 21 U/L (ref 12–78)
AMPHET UR QL SCN: NEGATIVE
ANION GAP SERPL CALC-SCNC: 7 MMOL/L (ref 2–12)
APPEARANCE UR: CLEAR
AST SERPL W P-5'-P-CCNC: 20 U/L (ref 15–37)
BACTERIA URNS QL MICRO: ABNORMAL /HPF
BARBITURATES UR QL SCN: NEGATIVE
BASOPHILS # BLD: 0.02 K/UL (ref 0–0.1)
BASOPHILS NFR BLD: 0.3 % (ref 0–1)
BENZODIAZ UR QL: NEGATIVE
BILIRUB SERPL-MCNC: 1 MG/DL (ref 0.2–1)
BILIRUB UR QL: NEGATIVE
BUN SERPL-MCNC: 10 MG/DL (ref 6–20)
BUN/CREAT SERPL: 14 (ref 12–20)
CA-I BLD-MCNC: 9.3 MG/DL (ref 8.5–10.1)
CANNABINOIDS UR QL SCN: NEGATIVE
CHLORIDE SERPL-SCNC: 109 MMOL/L (ref 97–108)
CO2 SERPL-SCNC: 25 MMOL/L (ref 21–32)
COCAINE UR QL SCN: NEGATIVE
COLOR UR: ABNORMAL
CREAT SERPL-MCNC: 0.71 MG/DL (ref 0.55–1.02)
DIFFERENTIAL METHOD BLD: NORMAL
EOSINOPHIL # BLD: 0.1 K/UL (ref 0–0.4)
EOSINOPHIL NFR BLD: 1.6 % (ref 0–7)
EPITH CASTS URNS QL MICRO: ABNORMAL /LPF
ERYTHROCYTE [DISTWIDTH] IN BLOOD BY AUTOMATED COUNT: 12.2 % (ref 11.5–14.5)
GLOBULIN SER CALC-MCNC: 3.4 G/DL (ref 2–4)
GLUCOSE BLD STRIP.AUTO-MCNC: 99 MG/DL (ref 65–100)
GLUCOSE SERPL-MCNC: 104 MG/DL (ref 65–100)
GLUCOSE UR STRIP.AUTO-MCNC: >500 MG/DL
HCT VFR BLD AUTO: 38.1 % (ref 35–47)
HGB BLD-MCNC: 12.9 G/DL (ref 11.5–16)
HGB UR QL STRIP: ABNORMAL
IMM GRANULOCYTES # BLD AUTO: 0.01 K/UL (ref 0–0.04)
IMM GRANULOCYTES NFR BLD AUTO: 0.2 % (ref 0–0.5)
KETONES UR QL STRIP.AUTO: NEGATIVE MG/DL
LEUKOCYTE ESTERASE UR QL STRIP.AUTO: ABNORMAL
LYMPHOCYTES # BLD: 2.74 K/UL (ref 0.8–3.5)
LYMPHOCYTES NFR BLD: 43.5 % (ref 12–49)
Lab: NORMAL
MAGNESIUM SERPL-MCNC: 2 MG/DL (ref 1.6–2.4)
MCH RBC QN AUTO: 32.5 PG (ref 26–34)
MCHC RBC AUTO-ENTMCNC: 33.9 G/DL (ref 30–36.5)
MCV RBC AUTO: 96 FL (ref 80–99)
METHADONE UR QL: NEGATIVE
MONOCYTES # BLD: 0.57 K/UL (ref 0–1)
MONOCYTES NFR BLD: 9 % (ref 5–13)
NEUTS SEG # BLD: 2.86 K/UL (ref 1.8–8)
NEUTS SEG NFR BLD: 45.4 % (ref 32–75)
NITRITE UR QL STRIP.AUTO: NEGATIVE
NRBC # BLD: 0 K/UL (ref 0–0.01)
NRBC BLD-RTO: 0 PER 100 WBC
OPIATES UR QL: NEGATIVE
PCP UR QL: NEGATIVE
PERFORMED BY:: NORMAL
PH UR STRIP: 7 (ref 5–8)
PLATELET # BLD AUTO: 162 K/UL (ref 150–400)
PMV BLD AUTO: 11.3 FL (ref 8.9–12.9)
POTASSIUM SERPL-SCNC: 3.4 MMOL/L (ref 3.5–5.1)
PROT SERPL-MCNC: 6.8 G/DL (ref 6.4–8.2)
PROT UR STRIP-MCNC: NEGATIVE MG/DL
RBC # BLD AUTO: 3.97 M/UL (ref 3.8–5.2)
RBC #/AREA URNS HPF: ABNORMAL /HPF (ref 0–5)
SODIUM SERPL-SCNC: 141 MMOL/L (ref 136–145)
SP GR UR REFRACTOMETRY: 1 (ref 1–1.03)
URINE CULTURE IF INDICATED: ABNORMAL
UROBILINOGEN UR QL STRIP.AUTO: 0.1 EU/DL (ref 0.1–1)
WBC # BLD AUTO: 6.3 K/UL (ref 3.6–11)
WBC URNS QL MICRO: ABNORMAL /HPF (ref 0–4)

## 2025-05-31 PROCEDURE — 80053 COMPREHEN METABOLIC PANEL: CPT

## 2025-05-31 PROCEDURE — 99284 EMERGENCY DEPT VISIT MOD MDM: CPT

## 2025-05-31 PROCEDURE — 96374 THER/PROPH/DIAG INJ IV PUSH: CPT

## 2025-05-31 PROCEDURE — 70450 CT HEAD/BRAIN W/O DYE: CPT

## 2025-05-31 PROCEDURE — 81001 URINALYSIS AUTO W/SCOPE: CPT

## 2025-05-31 PROCEDURE — 6360000002 HC RX W HCPCS: Performed by: STUDENT IN AN ORGANIZED HEALTH CARE EDUCATION/TRAINING PROGRAM

## 2025-05-31 PROCEDURE — 72125 CT NECK SPINE W/O DYE: CPT

## 2025-05-31 PROCEDURE — 82962 GLUCOSE BLOOD TEST: CPT

## 2025-05-31 PROCEDURE — 80307 DRUG TEST PRSMV CHEM ANLYZR: CPT

## 2025-05-31 PROCEDURE — 83735 ASSAY OF MAGNESIUM: CPT

## 2025-05-31 PROCEDURE — 85025 COMPLETE CBC W/AUTO DIFF WBC: CPT

## 2025-05-31 RX ORDER — LEVETIRACETAM 500 MG/5ML
1000 INJECTION, SOLUTION, CONCENTRATE INTRAVENOUS ONCE
Status: COMPLETED | OUTPATIENT
Start: 2025-05-31 | End: 2025-05-31

## 2025-05-31 RX ADMIN — LEVETIRACETAM 1000 MG: 100 INJECTION INTRAVENOUS at 16:14

## 2025-05-31 ASSESSMENT — LIFESTYLE VARIABLES
HOW MANY STANDARD DRINKS CONTAINING ALCOHOL DO YOU HAVE ON A TYPICAL DAY: PATIENT DOES NOT DRINK
HOW OFTEN DO YOU HAVE A DRINK CONTAINING ALCOHOL: NEVER

## 2025-05-31 ASSESSMENT — PAIN - FUNCTIONAL ASSESSMENT: PAIN_FUNCTIONAL_ASSESSMENT: 0-10

## 2025-05-31 ASSESSMENT — PAIN SCALES - GENERAL: PAINLEVEL_OUTOF10: 9

## 2025-05-31 NOTE — PROGRESS NOTES
Pt to CT   Valtrex Pregnancy And Lactation Text: this medication is Pregnancy Category B and is considered safe during pregnancy. This medication is not directly found in breast milk but it's metabolite acyclovir is present.

## 2025-05-31 NOTE — ED PROVIDER NOTES
[facility] EMERGENCY DEPT  EMERGENCY DEPARTMENT HISTORY AND PHYSICAL EXAM      Date of evaluation: 5/31/2025  Patient Name: Madonna Lauren  Birthdate 1950  MRN: [id number]  ED Provider: José Woodard MD   Note Started: 12:54 PM EDT 5/31/25    HISTORY OF PRESENT ILLNESS     Chief Complaint   Patient presents with    Seizures       History Provided By: Patient, {Historian:[id number]}     HPI: Madonna Lauren is a 74 y.o. female ***    PAST MEDICAL HISTORY   Past Medical History:  Past Medical History:   Diagnosis Date    CAD (coronary artery disease)     Cerebrovascular disease     CHF (congestive heart failure) (HCC)     Diabetes mellitus (HCC)     Hyperlipidemia     Hypertension     Seizures (HCC)        Past Surgical History:  Past Surgical History:   Procedure Laterality Date    CHOLECYSTECTOMY      EYE SURGERY      cataract bilateral    PARTIAL HYSTERECTOMY (CERVIX NOT REMOVED)         Family History:  No family history on file.    Social History:  Social History     Tobacco Use    Smoking status: Never    Smokeless tobacco: Never   Substance Use Topics    Alcohol use: Not Currently    Drug use: Not Currently       Allergies:  Allergies   Allergen Reactions    Hydrocodone      Other Reaction(s): confusion    Hydrocodone-Acetaminophen      Other reaction(s): Unknown (comments)    Oxycodone-Acetaminophen      Other reaction(s): Unknown (comments)       PCP: Tonia Mccoy MD    Current Meds:   No current facility-administered medications for this encounter.     Current Outpatient Medications   Medication Sig Dispense Refill    cefdinir (OMNICEF) 300 MG capsule Take 1 capsule by mouth 2 times daily for 7 days 14 capsule 0    fluconazole (DIFLUCAN) 200 MG tablet Take 1 tablet by mouth daily for 7 days 7 tablet 0    losartan (COZAAR) 50 MG tablet TAKE 1 TABLET BY MOUTH IN THE MORNING IN PLACE OF 25MG      sulfamethoxazole-trimethoprim (BACTRIM DS;SEPTRA DS) 800-160 MG per tablet TAKE 1 TABLET BY MOUTH EVERY 12 HOURS FOR 5

## 2025-05-31 NOTE — ED TRIAGE NOTES
Per ems, pt was in the grocery store parking when she had approx. 2 witnessed seizures. Pt is currently complaining of head and back pain.     Pt takes asa, arrives in C collar